# Patient Record
Sex: FEMALE | Race: WHITE | Employment: FULL TIME | URBAN - METROPOLITAN AREA
[De-identification: names, ages, dates, MRNs, and addresses within clinical notes are randomized per-mention and may not be internally consistent; named-entity substitution may affect disease eponyms.]

---

## 2018-06-13 ENCOUNTER — INITIAL CONSULT (OUTPATIENT)
Dept: SURGERY | Age: 28
End: 2018-06-13

## 2018-06-13 VITALS
SYSTOLIC BLOOD PRESSURE: 124 MMHG | HEIGHT: 64 IN | HEART RATE: 70 BPM | DIASTOLIC BLOOD PRESSURE: 77 MMHG | WEIGHT: 167 LBS | BODY MASS INDEX: 28.51 KG/M2

## 2018-06-13 DIAGNOSIS — M62.08 DIASTASIS RECTI: Primary | ICD-10-CM

## 2018-06-13 PROBLEM — K42.9 UMBILICAL HERNIA WITHOUT OBSTRUCTION AND WITHOUT GANGRENE: Status: ACTIVE | Noted: 2018-06-13

## 2018-06-13 PROCEDURE — 99213 OFFICE O/P EST LOW 20 MIN: CPT | Performed by: SURGERY

## 2018-06-13 ASSESSMENT — ENCOUNTER SYMPTOMS: GASTROINTESTINAL NEGATIVE: 1

## 2019-10-10 ENCOUNTER — HOSPITAL ENCOUNTER (OUTPATIENT)
Dept: NUCLEAR MEDICINE | Age: 29
Discharge: HOME | End: 2019-10-10
Payer: COMMERCIAL

## 2019-10-10 ENCOUNTER — HOSPITAL ENCOUNTER (OUTPATIENT)
Dept: MRI IMAGING | Age: 29
Discharge: HOME | End: 2019-10-10
Payer: COMMERCIAL

## 2019-10-10 ENCOUNTER — HOSPITAL ENCOUNTER (OUTPATIENT)
Dept: CT IMAGING | Age: 29
Discharge: HOME | End: 2019-10-10
Payer: COMMERCIAL

## 2019-10-10 ENCOUNTER — APPOINTMENT (OUTPATIENT)
Dept: WOMENS IMAGING | Age: 29
End: 2019-10-10
Payer: COMMERCIAL

## 2019-10-10 DIAGNOSIS — C50.511 MALIGNANT NEOPLASM OF LOWER-OUTER QUADRANT OF RIGHT FEMALE BREAST, UNSPECIFIED ESTROGEN RECEPTOR STATUS (HCC): ICD-10-CM

## 2019-10-10 DIAGNOSIS — C50.919 MALIGNANT NEOPLASM OF FEMALE BREAST, UNSPECIFIED ESTROGEN RECEPTOR STATUS, UNSPECIFIED LATERALITY, UNSPECIFIED SITE OF BREAST (HCC): ICD-10-CM

## 2019-10-10 PROCEDURE — A9577 INJ MULTIHANCE: HCPCS | Performed by: SURGERY

## 2019-10-10 PROCEDURE — A9503 TC99M MEDRONATE: HCPCS | Performed by: SURGERY

## 2019-10-10 PROCEDURE — 6360000004 HC RX CONTRAST MEDICATION: Performed by: SURGERY

## 2019-10-10 PROCEDURE — 3430000000 HC RX DIAGNOSTIC RADIOPHARMACEUTICAL: Performed by: SURGERY

## 2019-10-10 PROCEDURE — 78306 BONE IMAGING WHOLE BODY: CPT

## 2019-10-10 PROCEDURE — 77049 MRI BREAST C-+ W/CAD BI: CPT

## 2019-10-10 PROCEDURE — 74177 CT ABD & PELVIS W/CONTRAST: CPT

## 2019-10-10 RX ORDER — TC 99M MEDRONATE 20 MG/10ML
25 INJECTION, POWDER, LYOPHILIZED, FOR SOLUTION INTRAVENOUS
Status: COMPLETED | OUTPATIENT
Start: 2019-10-10 | End: 2019-10-10

## 2019-10-10 RX ADMIN — IOPAMIDOL 75 ML: 755 INJECTION, SOLUTION INTRAVENOUS at 09:40

## 2019-10-10 RX ADMIN — GADOBENATE DIMEGLUMINE 15 ML: 529 INJECTION, SOLUTION INTRAVENOUS at 12:15

## 2019-10-10 RX ADMIN — TC 99M MEDRONATE 25 MILLICURIE: 20 INJECTION, POWDER, LYOPHILIZED, FOR SOLUTION INTRAVENOUS at 09:43

## 2019-10-16 ENCOUNTER — HOSPITAL ENCOUNTER (OUTPATIENT)
Age: 29
Setting detail: OUTPATIENT SURGERY
Discharge: HOME | End: 2019-10-16
Attending: SURGERY | Admitting: SURGERY
Payer: COMMERCIAL

## 2019-10-16 ENCOUNTER — APPOINTMENT (OUTPATIENT)
Dept: GENERAL RADIOLOGY | Age: 29
End: 2019-10-16
Attending: SURGERY
Payer: COMMERCIAL

## 2019-10-16 ENCOUNTER — ANESTHESIA (OUTPATIENT)
Dept: OPERATING ROOM | Age: 29
End: 2019-10-16
Payer: COMMERCIAL

## 2019-10-16 ENCOUNTER — ANESTHESIA EVENT (OUTPATIENT)
Dept: OPERATING ROOM | Age: 29
End: 2019-10-16
Payer: COMMERCIAL

## 2019-10-16 VITALS
RESPIRATION RATE: 16 BRPM | DIASTOLIC BLOOD PRESSURE: 83 MMHG | TEMPERATURE: 97.6 F | WEIGHT: 165 LBS | HEIGHT: 64 IN | SYSTOLIC BLOOD PRESSURE: 145 MMHG | BODY MASS INDEX: 28.17 KG/M2 | HEART RATE: 52 BPM | OXYGEN SATURATION: 100 %

## 2019-10-16 VITALS — SYSTOLIC BLOOD PRESSURE: 88 MMHG | OXYGEN SATURATION: 96 % | DIASTOLIC BLOOD PRESSURE: 53 MMHG

## 2019-10-16 DIAGNOSIS — C50.511 MALIGNANT NEOPLASM OF LOWER-OUTER QUADRANT OF RIGHT BREAST OF FEMALE, ESTROGEN RECEPTOR POSITIVE (HCC): Primary | ICD-10-CM

## 2019-10-16 DIAGNOSIS — Z17.0 MALIGNANT NEOPLASM OF LOWER-OUTER QUADRANT OF RIGHT BREAST OF FEMALE, ESTROGEN RECEPTOR POSITIVE (HCC): Primary | ICD-10-CM

## 2019-10-16 LAB
A/G RATIO: 1.5 (ref 1.1–2.2)
ALBUMIN SERPL-MCNC: 4.8 G/DL (ref 3.4–5)
ALP BLD-CCNC: 84 U/L (ref 40–129)
ALT SERPL-CCNC: 21 U/L (ref 10–40)
ANION GAP SERPL CALCULATED.3IONS-SCNC: 13 MMOL/L (ref 3–16)
AST SERPL-CCNC: 22 U/L (ref 15–37)
BILIRUB SERPL-MCNC: 0.5 MG/DL (ref 0–1)
BUN BLDV-MCNC: 9 MG/DL (ref 7–20)
CALCIUM SERPL-MCNC: 9.6 MG/DL (ref 8.3–10.6)
CHLORIDE BLD-SCNC: 99 MMOL/L (ref 99–110)
CO2: 26 MMOL/L (ref 21–32)
CREAT SERPL-MCNC: 0.6 MG/DL (ref 0.6–1.1)
GFR AFRICAN AMERICAN: >60
GFR NON-AFRICAN AMERICAN: >60
GLOBULIN: 3.3 G/DL
GLUCOSE BLD-MCNC: 90 MG/DL (ref 70–99)
HCT VFR BLD CALC: 41.8 % (ref 36–48)
HEMOGLOBIN: 14.1 G/DL (ref 12–16)
MCH RBC QN AUTO: 29.8 PG (ref 26–34)
MCHC RBC AUTO-ENTMCNC: 33.7 G/DL (ref 31–36)
MCV RBC AUTO: 88.5 FL (ref 80–100)
PDW BLD-RTO: 13.8 % (ref 12.4–15.4)
PLATELET # BLD: 252 K/UL (ref 135–450)
PMV BLD AUTO: 8.5 FL (ref 5–10.5)
POTASSIUM SERPL-SCNC: 3.8 MMOL/L (ref 3.5–5.1)
PREGNANCY, URINE: NEGATIVE
RBC # BLD: 4.72 M/UL (ref 4–5.2)
SODIUM BLD-SCNC: 138 MMOL/L (ref 136–145)
TOTAL PROTEIN: 8.1 G/DL (ref 6.4–8.2)
WBC # BLD: 8.5 K/UL (ref 4–11)

## 2019-10-16 PROCEDURE — 3600000014 HC SURGERY LEVEL 4 ADDTL 15MIN: Performed by: SURGERY

## 2019-10-16 PROCEDURE — 3700000000 HC ANESTHESIA ATTENDED CARE: Performed by: SURGERY

## 2019-10-16 PROCEDURE — 6360000002 HC RX W HCPCS: Performed by: ANESTHESIOLOGY

## 2019-10-16 PROCEDURE — 80053 COMPREHEN METABOLIC PANEL: CPT

## 2019-10-16 PROCEDURE — 7100000011 HC PHASE II RECOVERY - ADDTL 15 MIN: Performed by: SURGERY

## 2019-10-16 PROCEDURE — C1788 PORT, INDWELLING, IMP: HCPCS | Performed by: SURGERY

## 2019-10-16 PROCEDURE — 7100000001 HC PACU RECOVERY - ADDTL 15 MIN: Performed by: SURGERY

## 2019-10-16 PROCEDURE — 3700000001 HC ADD 15 MINUTES (ANESTHESIA): Performed by: SURGERY

## 2019-10-16 PROCEDURE — 7100000000 HC PACU RECOVERY - FIRST 15 MIN: Performed by: SURGERY

## 2019-10-16 PROCEDURE — 3600000004 HC SURGERY LEVEL 4 BASE: Performed by: SURGERY

## 2019-10-16 PROCEDURE — 7100000010 HC PHASE II RECOVERY - FIRST 15 MIN: Performed by: SURGERY

## 2019-10-16 PROCEDURE — 84703 CHORIONIC GONADOTROPIN ASSAY: CPT

## 2019-10-16 PROCEDURE — 85027 COMPLETE CBC AUTOMATED: CPT

## 2019-10-16 PROCEDURE — 6360000002 HC RX W HCPCS: Performed by: NURSE ANESTHETIST, CERTIFIED REGISTERED

## 2019-10-16 PROCEDURE — 2500000003 HC RX 250 WO HCPCS: Performed by: SURGERY

## 2019-10-16 PROCEDURE — 2580000003 HC RX 258: Performed by: SURGERY

## 2019-10-16 PROCEDURE — 6360000002 HC RX W HCPCS: Performed by: SURGERY

## 2019-10-16 PROCEDURE — 2709999900 HC NON-CHARGEABLE SUPPLY: Performed by: SURGERY

## 2019-10-16 PROCEDURE — 2580000003 HC RX 258: Performed by: ANESTHESIOLOGY

## 2019-10-16 PROCEDURE — 77001 FLUOROGUIDE FOR VEIN DEVICE: CPT

## 2019-10-16 PROCEDURE — 71045 X-RAY EXAM CHEST 1 VIEW: CPT

## 2019-10-16 DEVICE — PORT INFUS 6FR PLAS PWR INJ ATTCH POLYUR CATH SIL FILL SUT: Type: IMPLANTABLE DEVICE | Site: SUBCLAVIAN | Status: FUNCTIONAL

## 2019-10-16 RX ORDER — LABETALOL 20 MG/4 ML (5 MG/ML) INTRAVENOUS SYRINGE
5 EVERY 10 MIN PRN
Status: DISCONTINUED | OUTPATIENT
Start: 2019-10-16 | End: 2019-10-16 | Stop reason: HOSPADM

## 2019-10-16 RX ORDER — HYDROCODONE BITARTRATE AND ACETAMINOPHEN 5; 325 MG/1; MG/1
1 TABLET ORAL EVERY 6 HOURS PRN
Qty: 10 TABLET | Refills: 0 | Status: SHIPPED | OUTPATIENT
Start: 2019-10-16 | End: 2019-10-19

## 2019-10-16 RX ORDER — SODIUM CHLORIDE, SODIUM LACTATE, POTASSIUM CHLORIDE, CALCIUM CHLORIDE 600; 310; 30; 20 MG/100ML; MG/100ML; MG/100ML; MG/100ML
INJECTION, SOLUTION INTRAVENOUS CONTINUOUS
Status: DISCONTINUED | OUTPATIENT
Start: 2019-10-16 | End: 2019-10-16 | Stop reason: HOSPADM

## 2019-10-16 RX ORDER — LIDOCAINE HYDROCHLORIDE 10 MG/ML
INJECTION, SOLUTION INFILTRATION; PERINEURAL PRN
Status: DISCONTINUED | OUTPATIENT
Start: 2019-10-16 | End: 2019-10-16 | Stop reason: ALTCHOICE

## 2019-10-16 RX ORDER — FENTANYL CITRATE 50 UG/ML
INJECTION, SOLUTION INTRAMUSCULAR; INTRAVENOUS PRN
Status: DISCONTINUED | OUTPATIENT
Start: 2019-10-16 | End: 2019-10-16 | Stop reason: SDUPTHER

## 2019-10-16 RX ORDER — PROMETHAZINE HYDROCHLORIDE 25 MG/ML
6.25 INJECTION, SOLUTION INTRAMUSCULAR; INTRAVENOUS
Status: DISCONTINUED | OUTPATIENT
Start: 2019-10-16 | End: 2019-10-16 | Stop reason: HOSPADM

## 2019-10-16 RX ORDER — FENTANYL CITRATE 50 UG/ML
50 INJECTION, SOLUTION INTRAMUSCULAR; INTRAVENOUS EVERY 5 MIN PRN
Status: DISCONTINUED | OUTPATIENT
Start: 2019-10-16 | End: 2019-10-16 | Stop reason: HOSPADM

## 2019-10-16 RX ORDER — HYDRALAZINE HYDROCHLORIDE 20 MG/ML
5 INJECTION INTRAMUSCULAR; INTRAVENOUS EVERY 10 MIN PRN
Status: DISCONTINUED | OUTPATIENT
Start: 2019-10-16 | End: 2019-10-16 | Stop reason: HOSPADM

## 2019-10-16 RX ORDER — LIDOCAINE HYDROCHLORIDE 20 MG/ML
INJECTION, SOLUTION INTRAVENOUS PRN
Status: DISCONTINUED | OUTPATIENT
Start: 2019-10-16 | End: 2019-10-16 | Stop reason: SDUPTHER

## 2019-10-16 RX ORDER — ONDANSETRON 2 MG/ML
4 INJECTION INTRAMUSCULAR; INTRAVENOUS
Status: DISCONTINUED | OUTPATIENT
Start: 2019-10-16 | End: 2019-10-16 | Stop reason: HOSPADM

## 2019-10-16 RX ORDER — HEPARIN SODIUM (PORCINE) LOCK FLUSH IV SOLN 100 UNIT/ML 100 UNIT/ML
SOLUTION INTRAVENOUS PRN
Status: DISCONTINUED | OUTPATIENT
Start: 2019-10-16 | End: 2019-10-16 | Stop reason: ALTCHOICE

## 2019-10-16 RX ORDER — LIDOCAINE AND PRILOCAINE 25; 25 MG/G; MG/G
CREAM TOPICAL
Qty: 1 TUBE | Refills: 10 | Status: SHIPPED | OUTPATIENT
Start: 2019-10-16 | End: 2021-11-01

## 2019-10-16 RX ORDER — PROPOFOL 10 MG/ML
INJECTION, EMULSION INTRAVENOUS PRN
Status: DISCONTINUED | OUTPATIENT
Start: 2019-10-16 | End: 2019-10-16 | Stop reason: SDUPTHER

## 2019-10-16 RX ORDER — MAGNESIUM HYDROXIDE 1200 MG/15ML
LIQUID ORAL CONTINUOUS PRN
Status: COMPLETED | OUTPATIENT
Start: 2019-10-16 | End: 2019-10-16

## 2019-10-16 RX ORDER — CEFAZOLIN SODIUM 1 G/3ML
INJECTION, POWDER, FOR SOLUTION INTRAMUSCULAR; INTRAVENOUS PRN
Status: DISCONTINUED | OUTPATIENT
Start: 2019-10-16 | End: 2019-10-16 | Stop reason: SDUPTHER

## 2019-10-16 RX ORDER — FENTANYL CITRATE 50 UG/ML
25 INJECTION, SOLUTION INTRAMUSCULAR; INTRAVENOUS EVERY 5 MIN PRN
Status: DISCONTINUED | OUTPATIENT
Start: 2019-10-16 | End: 2019-10-16 | Stop reason: HOSPADM

## 2019-10-16 RX ORDER — OXYCODONE HYDROCHLORIDE AND ACETAMINOPHEN 5; 325 MG/1; MG/1
1 TABLET ORAL
Status: DISCONTINUED | OUTPATIENT
Start: 2019-10-16 | End: 2019-10-16 | Stop reason: HOSPADM

## 2019-10-16 RX ORDER — MIDAZOLAM HYDROCHLORIDE 1 MG/ML
INJECTION INTRAMUSCULAR; INTRAVENOUS PRN
Status: DISCONTINUED | OUTPATIENT
Start: 2019-10-16 | End: 2019-10-16 | Stop reason: SDUPTHER

## 2019-10-16 RX ORDER — ONDANSETRON 4 MG/1
4 TABLET, FILM COATED ORAL EVERY 8 HOURS PRN
Qty: 2 TABLET | Refills: 1 | Status: ON HOLD | OUTPATIENT
Start: 2019-10-16 | End: 2019-11-24

## 2019-10-16 RX ADMIN — PROPOFOL 50 MG: 10 INJECTION, EMULSION INTRAVENOUS at 13:45

## 2019-10-16 RX ADMIN — FENTANYL CITRATE 50 MCG: 50 INJECTION INTRAMUSCULAR; INTRAVENOUS at 13:44

## 2019-10-16 RX ADMIN — PROPOFOL 50 MG: 10 INJECTION, EMULSION INTRAVENOUS at 13:39

## 2019-10-16 RX ADMIN — LIDOCAINE HYDROCHLORIDE 50 MG: 20 INJECTION, SOLUTION INTRAVENOUS at 13:34

## 2019-10-16 RX ADMIN — MIDAZOLAM HYDROCHLORIDE 2 MG: 2 INJECTION, SOLUTION INTRAMUSCULAR; INTRAVENOUS at 13:26

## 2019-10-16 RX ADMIN — SODIUM CHLORIDE, SODIUM LACTATE, POTASSIUM CHLORIDE, AND CALCIUM CHLORIDE: 600; 310; 30; 20 INJECTION, SOLUTION INTRAVENOUS at 12:40

## 2019-10-16 RX ADMIN — PROPOFOL 30 MG: 10 INJECTION, EMULSION INTRAVENOUS at 13:52

## 2019-10-16 RX ADMIN — CEFAZOLIN SODIUM 2000 MG: 1 POWDER, FOR SOLUTION INTRAMUSCULAR; INTRAVENOUS at 13:32

## 2019-10-16 RX ADMIN — SODIUM CHLORIDE, SODIUM LACTATE, POTASSIUM CHLORIDE, AND CALCIUM CHLORIDE: 600; 310; 30; 20 INJECTION, SOLUTION INTRAVENOUS at 13:29

## 2019-10-16 RX ADMIN — PROPOFOL 50 MG: 10 INJECTION, EMULSION INTRAVENOUS at 13:34

## 2019-10-16 RX ADMIN — PROPOFOL 50 MG: 10 INJECTION, EMULSION INTRAVENOUS at 13:41

## 2019-10-16 RX ADMIN — PROPOFOL 50 MG: 10 INJECTION, EMULSION INTRAVENOUS at 13:48

## 2019-10-16 RX ADMIN — FENTANYL CITRATE 50 MCG: 50 INJECTION INTRAMUSCULAR; INTRAVENOUS at 13:34

## 2019-10-16 RX ADMIN — PROPOFOL 50 MG: 10 INJECTION, EMULSION INTRAVENOUS at 13:36

## 2019-10-16 RX ADMIN — FENTANYL CITRATE 50 MCG: 50 INJECTION INTRAMUSCULAR; INTRAVENOUS at 14:33

## 2019-10-16 RX ADMIN — PROPOFOL 50 MG: 10 INJECTION, EMULSION INTRAVENOUS at 13:43

## 2019-10-16 RX ADMIN — PROPOFOL 30 MG: 10 INJECTION, EMULSION INTRAVENOUS at 13:55

## 2019-10-16 ASSESSMENT — PULMONARY FUNCTION TESTS
PIF_VALUE: 0
PIF_VALUE: 0
PIF_VALUE: 1
PIF_VALUE: 0
PIF_VALUE: 1
PIF_VALUE: 0
PIF_VALUE: 0
PIF_VALUE: 1
PIF_VALUE: 0
PIF_VALUE: 1
PIF_VALUE: 0
PIF_VALUE: 1
PIF_VALUE: 1
PIF_VALUE: 0
PIF_VALUE: 1
PIF_VALUE: 0
PIF_VALUE: 1
PIF_VALUE: 0
PIF_VALUE: 1
PIF_VALUE: 0
PIF_VALUE: 1
PIF_VALUE: 0
PIF_VALUE: 0

## 2019-10-16 ASSESSMENT — PAIN DESCRIPTION - DESCRIPTORS
DESCRIPTORS: ACHING;DISCOMFORT
DESCRIPTORS: THROBBING
DESCRIPTORS: SORE

## 2019-10-16 ASSESSMENT — PAIN DESCRIPTION - ORIENTATION
ORIENTATION: LEFT;UPPER
ORIENTATION: LEFT;UPPER

## 2019-10-16 ASSESSMENT — PAIN DESCRIPTION - LOCATION
LOCATION: CHEST
LOCATION: CHEST

## 2019-10-16 ASSESSMENT — PAIN SCALES - GENERAL
PAINLEVEL_OUTOF10: 4
PAINLEVEL_OUTOF10: 4
PAINLEVEL_OUTOF10: 8

## 2019-10-16 ASSESSMENT — PAIN - FUNCTIONAL ASSESSMENT: PAIN_FUNCTIONAL_ASSESSMENT: 0-10

## 2019-10-16 ASSESSMENT — PAIN DESCRIPTION - PAIN TYPE
TYPE: SURGICAL PAIN
TYPE: SURGICAL PAIN

## 2019-10-17 ENCOUNTER — HOSPITAL ENCOUNTER (OUTPATIENT)
Dept: NON INVASIVE DIAGNOSTICS | Age: 29
Discharge: HOME | End: 2019-10-17
Payer: COMMERCIAL

## 2019-10-17 LAB
LV EF: 58 %
LVEF MODALITY: NORMAL

## 2019-10-17 PROCEDURE — 93306 TTE W/DOPPLER COMPLETE: CPT

## 2019-11-24 ENCOUNTER — APPOINTMENT (OUTPATIENT)
Dept: CT IMAGING | Age: 29
DRG: 153 | End: 2019-11-24
Payer: COMMERCIAL

## 2019-11-24 ENCOUNTER — APPOINTMENT (OUTPATIENT)
Dept: GENERAL RADIOLOGY | Age: 29
DRG: 153 | End: 2019-11-24
Payer: COMMERCIAL

## 2019-11-24 ENCOUNTER — HOSPITAL ENCOUNTER (INPATIENT)
Age: 29
LOS: 2 days | Discharge: HOME | DRG: 153 | End: 2019-11-26
Attending: EMERGENCY MEDICINE | Admitting: INTERNAL MEDICINE
Payer: COMMERCIAL

## 2019-11-24 DIAGNOSIS — R53.1 GENERALIZED WEAKNESS: Primary | ICD-10-CM

## 2019-11-24 PROBLEM — R41.82 ALTERED MENTAL STATUS: Status: ACTIVE | Noted: 2019-11-24

## 2019-11-24 LAB
ALBUMIN SERPL-MCNC: 3.9 G/DL (ref 3.4–5)
ALP BLD-CCNC: 139 U/L (ref 40–129)
ALT SERPL-CCNC: 11 U/L (ref 10–40)
AMMONIA: 24 UMOL/L (ref 11–51)
ANION GAP SERPL CALCULATED.3IONS-SCNC: 7 MMOL/L (ref 3–16)
AST SERPL-CCNC: 9 U/L (ref 15–37)
BANDED NEUTROPHILS RELATIVE PERCENT: 1 % (ref 0–7)
BASOPHILS ABSOLUTE: 0 K/UL (ref 0–0.2)
BASOPHILS RELATIVE PERCENT: 0 %
BILIRUB SERPL-MCNC: <0.2 MG/DL (ref 0–1)
BILIRUBIN DIRECT: <0.2 MG/DL (ref 0–0.3)
BILIRUBIN URINE: NEGATIVE
BILIRUBIN, INDIRECT: ABNORMAL MG/DL (ref 0–1)
BLOOD, URINE: NEGATIVE
BUN BLDV-MCNC: 18 MG/DL (ref 7–20)
CALCIUM SERPL-MCNC: 8.5 MG/DL (ref 8.3–10.6)
CHLORIDE BLD-SCNC: 106 MMOL/L (ref 99–110)
CLARITY: CLEAR
CO2: 25 MMOL/L (ref 21–32)
COLOR: YELLOW
CREAT SERPL-MCNC: <0.5 MG/DL (ref 0.6–1.1)
EOSINOPHILS ABSOLUTE: 0 K/UL (ref 0–0.6)
EOSINOPHILS RELATIVE PERCENT: 0 %
GFR AFRICAN AMERICAN: >60
GFR NON-AFRICAN AMERICAN: >60
GLUCOSE BLD-MCNC: 121 MG/DL (ref 70–99)
GLUCOSE URINE: NEGATIVE MG/DL
HCG(URINE) PREGNANCY TEST: NEGATIVE
HCT VFR BLD CALC: 34.8 % (ref 36–48)
HEMOGLOBIN: 11.3 G/DL (ref 12–16)
KETONES, URINE: NEGATIVE MG/DL
LEUKOCYTE ESTERASE, URINE: NEGATIVE
LIPASE: 10 U/L (ref 13–60)
LYMPHOCYTES ABSOLUTE: 0 K/UL (ref 1–5.1)
LYMPHOCYTES RELATIVE PERCENT: 0 %
MCH RBC QN AUTO: 29.4 PG (ref 26–34)
MCHC RBC AUTO-ENTMCNC: 32.5 G/DL (ref 31–36)
MCV RBC AUTO: 90.4 FL (ref 80–100)
MICROSCOPIC EXAMINATION: NORMAL
MONOCYTES ABSOLUTE: 0 K/UL (ref 0–1.3)
MONOCYTES RELATIVE PERCENT: 0 %
NEUTROPHILS ABSOLUTE: 35.7 K/UL (ref 1.7–7.7)
NEUTROPHILS RELATIVE PERCENT: 99 %
NITRITE, URINE: NEGATIVE
PDW BLD-RTO: 15.4 % (ref 12.4–15.4)
PH UA: 7 (ref 5–8)
PLATELET # BLD: 190 K/UL (ref 135–450)
PMV BLD AUTO: 7.5 FL (ref 5–10.5)
POTASSIUM REFLEX MAGNESIUM: 4.2 MMOL/L (ref 3.5–5.1)
PROTEIN UA: NEGATIVE MG/DL
RAPID INFLUENZA  B AGN: NEGATIVE
RAPID INFLUENZA A AGN: NEGATIVE
RBC # BLD: 3.85 M/UL (ref 4–5.2)
SODIUM BLD-SCNC: 138 MMOL/L (ref 136–145)
SPECIFIC GRAVITY UA: 1.01 (ref 1–1.03)
TOTAL PROTEIN: 6.2 G/DL (ref 6.4–8.2)
URINE TYPE: NORMAL
UROBILINOGEN, URINE: 0.2 E.U./DL
WBC # BLD: 35.7 K/UL (ref 4–11)

## 2019-11-24 PROCEDURE — 87040 BLOOD CULTURE FOR BACTERIA: CPT

## 2019-11-24 PROCEDURE — 70450 CT HEAD/BRAIN W/O DYE: CPT

## 2019-11-24 PROCEDURE — 6370000000 HC RX 637 (ALT 250 FOR IP): Performed by: INTERNAL MEDICINE

## 2019-11-24 PROCEDURE — 99285 EMERGENCY DEPT VISIT HI MDM: CPT

## 2019-11-24 PROCEDURE — 87804 INFLUENZA ASSAY W/OPTIC: CPT

## 2019-11-24 PROCEDURE — G0378 HOSPITAL OBSERVATION PER HR: HCPCS

## 2019-11-24 PROCEDURE — 2580000003 HC RX 258: Performed by: INTERNAL MEDICINE

## 2019-11-24 PROCEDURE — 2500000003 HC RX 250 WO HCPCS: Performed by: INTERNAL MEDICINE

## 2019-11-24 PROCEDURE — 6360000002 HC RX W HCPCS: Performed by: INTERNAL MEDICINE

## 2019-11-24 PROCEDURE — 2580000003 HC RX 258: Performed by: STUDENT IN AN ORGANIZED HEALTH CARE EDUCATION/TRAINING PROGRAM

## 2019-11-24 PROCEDURE — 84443 ASSAY THYROID STIM HORMONE: CPT

## 2019-11-24 PROCEDURE — 6360000004 HC RX CONTRAST MEDICATION: Performed by: EMERGENCY MEDICINE

## 2019-11-24 PROCEDURE — 74177 CT ABD & PELVIS W/CONTRAST: CPT

## 2019-11-24 PROCEDURE — 81003 URINALYSIS AUTO W/O SCOPE: CPT

## 2019-11-24 PROCEDURE — 85025 COMPLETE CBC W/AUTO DIFF WBC: CPT

## 2019-11-24 PROCEDURE — 80076 HEPATIC FUNCTION PANEL: CPT

## 2019-11-24 PROCEDURE — 84703 CHORIONIC GONADOTROPIN ASSAY: CPT

## 2019-11-24 PROCEDURE — 36415 COLL VENOUS BLD VENIPUNCTURE: CPT

## 2019-11-24 PROCEDURE — 71045 X-RAY EXAM CHEST 1 VIEW: CPT

## 2019-11-24 PROCEDURE — 96365 THER/PROPH/DIAG IV INF INIT: CPT

## 2019-11-24 PROCEDURE — 96367 TX/PROPH/DG ADDL SEQ IV INF: CPT

## 2019-11-24 PROCEDURE — 96372 THER/PROPH/DIAG INJ SC/IM: CPT

## 2019-11-24 PROCEDURE — 83690 ASSAY OF LIPASE: CPT

## 2019-11-24 PROCEDURE — 80048 BASIC METABOLIC PNL TOTAL CA: CPT

## 2019-11-24 PROCEDURE — 6360000002 HC RX W HCPCS: Performed by: STUDENT IN AN ORGANIZED HEALTH CARE EDUCATION/TRAINING PROGRAM

## 2019-11-24 PROCEDURE — 1200000000 HC SEMI PRIVATE

## 2019-11-24 PROCEDURE — 82140 ASSAY OF AMMONIA: CPT

## 2019-11-24 PROCEDURE — 96361 HYDRATE IV INFUSION ADD-ON: CPT

## 2019-11-24 RX ORDER — CETIRIZINE HYDROCHLORIDE 10 MG/1
10 TABLET ORAL DAILY
Status: DISCONTINUED | OUTPATIENT
Start: 2019-11-24 | End: 2019-11-26 | Stop reason: HOSPADM

## 2019-11-24 RX ORDER — SODIUM CHLORIDE 9 MG/ML
INJECTION, SOLUTION INTRAVENOUS CONTINUOUS
Status: DISCONTINUED | OUTPATIENT
Start: 2019-11-24 | End: 2019-11-26 | Stop reason: HOSPADM

## 2019-11-24 RX ORDER — SODIUM CHLORIDE 0.9 % (FLUSH) 0.9 %
10 SYRINGE (ML) INJECTION PRN
Status: DISCONTINUED | OUTPATIENT
Start: 2019-11-24 | End: 2019-11-26 | Stop reason: HOSPADM

## 2019-11-24 RX ORDER — SODIUM CHLORIDE, SODIUM LACTATE, POTASSIUM CHLORIDE, CALCIUM CHLORIDE 600; 310; 30; 20 MG/100ML; MG/100ML; MG/100ML; MG/100ML
1000 INJECTION, SOLUTION INTRAVENOUS ONCE
Status: COMPLETED | OUTPATIENT
Start: 2019-11-24 | End: 2019-11-24

## 2019-11-24 RX ORDER — LEVOTHYROXINE SODIUM 0.05 MG/1
50 TABLET ORAL DAILY
Status: DISCONTINUED | OUTPATIENT
Start: 2019-11-24 | End: 2019-11-26 | Stop reason: HOSPADM

## 2019-11-24 RX ORDER — OLANZAPINE 10 MG/1
10 TABLET ORAL EVERY 6 HOURS PRN
Status: DISCONTINUED | OUTPATIENT
Start: 2019-11-24 | End: 2019-11-24

## 2019-11-24 RX ORDER — SODIUM CHLORIDE 0.9 % (FLUSH) 0.9 %
10 SYRINGE (ML) INJECTION EVERY 12 HOURS SCHEDULED
Status: DISCONTINUED | OUTPATIENT
Start: 2019-11-24 | End: 2019-11-26 | Stop reason: HOSPADM

## 2019-11-24 RX ORDER — OLANZAPINE 10 MG/1
5 TABLET ORAL EVERY 6 HOURS PRN
Status: DISCONTINUED | OUTPATIENT
Start: 2019-11-24 | End: 2019-11-25

## 2019-11-24 RX ORDER — ACETAMINOPHEN 500 MG
1000 TABLET ORAL EVERY 8 HOURS PRN
Status: DISCONTINUED | OUTPATIENT
Start: 2019-11-24 | End: 2019-11-26 | Stop reason: HOSPADM

## 2019-11-24 RX ORDER — ONDANSETRON 2 MG/ML
4 INJECTION INTRAMUSCULAR; INTRAVENOUS EVERY 6 HOURS PRN
Status: DISCONTINUED | OUTPATIENT
Start: 2019-11-24 | End: 2019-11-26 | Stop reason: HOSPADM

## 2019-11-24 RX ORDER — CETIRIZINE HYDROCHLORIDE 10 MG/1
10 TABLET ORAL DAILY
Status: DISCONTINUED | OUTPATIENT
Start: 2019-11-25 | End: 2019-11-24

## 2019-11-24 RX ADMIN — IOPAMIDOL 80 ML: 755 INJECTION, SOLUTION INTRAVENOUS at 16:01

## 2019-11-24 RX ADMIN — SODIUM CHLORIDE: 9 INJECTION, SOLUTION INTRAVENOUS at 19:38

## 2019-11-24 RX ADMIN — FOLIC ACID: 5 INJECTION, SOLUTION INTRAMUSCULAR; INTRAVENOUS; SUBCUTANEOUS at 20:51

## 2019-11-24 RX ADMIN — CEFEPIME HYDROCHLORIDE 2 G: 2 INJECTION, POWDER, FOR SOLUTION INTRAVENOUS at 15:03

## 2019-11-24 RX ADMIN — CETIRIZINE HYDROCHLORIDE 10 MG: 10 TABLET, FILM COATED ORAL at 22:12

## 2019-11-24 RX ADMIN — VANCOMYCIN HYDROCHLORIDE 1000 MG: 10 INJECTION, POWDER, LYOPHILIZED, FOR SOLUTION INTRAVENOUS at 20:51

## 2019-11-24 RX ADMIN — OLANZAPINE 5 MG: 10 TABLET, FILM COATED ORAL at 22:11

## 2019-11-24 RX ADMIN — ENOXAPARIN SODIUM 40 MG: 40 INJECTION SUBCUTANEOUS at 20:51

## 2019-11-24 RX ADMIN — SODIUM CHLORIDE, POTASSIUM CHLORIDE, SODIUM LACTATE AND CALCIUM CHLORIDE 1000 ML: 600; 310; 30; 20 INJECTION, SOLUTION INTRAVENOUS at 12:46

## 2019-11-24 ASSESSMENT — PAIN SCALES - GENERAL
PAINLEVEL_OUTOF10: 0
PAINLEVEL_OUTOF10: 0

## 2019-11-25 LAB
ALBUMIN SERPL-MCNC: 3.8 G/DL (ref 3.4–5)
ANION GAP SERPL CALCULATED.3IONS-SCNC: 11 MMOL/L (ref 3–16)
BASOPHILS ABSOLUTE: 0 K/UL (ref 0–0.2)
BASOPHILS RELATIVE PERCENT: 0 %
BUN BLDV-MCNC: 19 MG/DL (ref 7–20)
CALCIUM SERPL-MCNC: 8.7 MG/DL (ref 8.3–10.6)
CHLORIDE BLD-SCNC: 103 MMOL/L (ref 99–110)
CO2: 25 MMOL/L (ref 21–32)
CREAT SERPL-MCNC: <0.5 MG/DL (ref 0.6–1.1)
EOSINOPHILS ABSOLUTE: 0 K/UL (ref 0–0.6)
EOSINOPHILS RELATIVE PERCENT: 0 %
GFR AFRICAN AMERICAN: >60
GFR NON-AFRICAN AMERICAN: >60
GLUCOSE BLD-MCNC: 96 MG/DL (ref 70–99)
HCT VFR BLD CALC: 32 % (ref 36–48)
HEMOGLOBIN: 10.8 G/DL (ref 12–16)
LYMPHOCYTES ABSOLUTE: 1.7 K/UL (ref 1–5.1)
LYMPHOCYTES RELATIVE PERCENT: 7 %
MCH RBC QN AUTO: 30.4 PG (ref 26–34)
MCHC RBC AUTO-ENTMCNC: 33.6 G/DL (ref 31–36)
MCV RBC AUTO: 90.5 FL (ref 80–100)
MONOCYTES ABSOLUTE: 0 K/UL (ref 0–1.3)
MONOCYTES RELATIVE PERCENT: 0 %
NEUTROPHILS ABSOLUTE: 22.4 K/UL (ref 1.7–7.7)
NEUTROPHILS RELATIVE PERCENT: 93 %
PDW BLD-RTO: 15.4 % (ref 12.4–15.4)
PHOSPHORUS: 3.3 MG/DL (ref 2.5–4.9)
PLATELET # BLD: 181 K/UL (ref 135–450)
PMV BLD AUTO: 7.3 FL (ref 5–10.5)
POTASSIUM SERPL-SCNC: 4 MMOL/L (ref 3.5–5.1)
RBC # BLD: 3.54 M/UL (ref 4–5.2)
SODIUM BLD-SCNC: 139 MMOL/L (ref 136–145)
TSH REFLEX: 1.09 UIU/ML (ref 0.27–4.2)
WBC # BLD: 24.1 K/UL (ref 4–11)

## 2019-11-25 PROCEDURE — 85025 COMPLETE CBC W/AUTO DIFF WBC: CPT

## 2019-11-25 PROCEDURE — 6370000000 HC RX 637 (ALT 250 FOR IP): Performed by: INTERNAL MEDICINE

## 2019-11-25 PROCEDURE — 97165 OT EVAL LOW COMPLEX 30 MIN: CPT

## 2019-11-25 PROCEDURE — 96366 THER/PROPH/DIAG IV INF ADDON: CPT

## 2019-11-25 PROCEDURE — 96372 THER/PROPH/DIAG INJ SC/IM: CPT

## 2019-11-25 PROCEDURE — 1200000000 HC SEMI PRIVATE

## 2019-11-25 PROCEDURE — G0378 HOSPITAL OBSERVATION PER HR: HCPCS

## 2019-11-25 PROCEDURE — 97530 THERAPEUTIC ACTIVITIES: CPT

## 2019-11-25 PROCEDURE — 87641 MR-STAPH DNA AMP PROBE: CPT

## 2019-11-25 PROCEDURE — 6360000002 HC RX W HCPCS: Performed by: INTERNAL MEDICINE

## 2019-11-25 PROCEDURE — 80069 RENAL FUNCTION PANEL: CPT

## 2019-11-25 PROCEDURE — 36591 DRAW BLOOD OFF VENOUS DEVICE: CPT

## 2019-11-25 PROCEDURE — 2580000003 HC RX 258: Performed by: INTERNAL MEDICINE

## 2019-11-25 RX ADMIN — CEFEPIME 2 G: 2 INJECTION, POWDER, FOR SOLUTION INTRAMUSCULAR; INTRAVENOUS at 15:11

## 2019-11-25 RX ADMIN — Medication 10 ML: at 08:12

## 2019-11-25 RX ADMIN — CEFEPIME 2 G: 2 INJECTION, POWDER, FOR SOLUTION INTRAMUSCULAR; INTRAVENOUS at 03:40

## 2019-11-25 RX ADMIN — CETIRIZINE HYDROCHLORIDE 10 MG: 10 TABLET, FILM COATED ORAL at 20:23

## 2019-11-25 RX ADMIN — ENOXAPARIN SODIUM 40 MG: 40 INJECTION SUBCUTANEOUS at 08:09

## 2019-11-25 RX ADMIN — LEVOTHYROXINE SODIUM 50 MCG: 50 TABLET ORAL at 05:48

## 2019-11-25 RX ADMIN — VANCOMYCIN HYDROCHLORIDE 1000 MG: 10 INJECTION, POWDER, LYOPHILIZED, FOR SOLUTION INTRAVENOUS at 03:40

## 2019-11-25 RX ADMIN — VANCOMYCIN HYDROCHLORIDE 1000 MG: 10 INJECTION, POWDER, LYOPHILIZED, FOR SOLUTION INTRAVENOUS at 12:47

## 2019-11-25 RX ADMIN — VANCOMYCIN HYDROCHLORIDE 1000 MG: 10 INJECTION, POWDER, LYOPHILIZED, FOR SOLUTION INTRAVENOUS at 20:24

## 2019-11-25 RX ADMIN — Medication 10 ML: at 20:25

## 2019-11-25 ASSESSMENT — PAIN SCALES - GENERAL
PAINLEVEL_OUTOF10: 0

## 2019-11-26 VITALS
DIASTOLIC BLOOD PRESSURE: 57 MMHG | SYSTOLIC BLOOD PRESSURE: 105 MMHG | HEIGHT: 64 IN | WEIGHT: 165 LBS | TEMPERATURE: 97.8 F | HEART RATE: 73 BPM | BODY MASS INDEX: 28.17 KG/M2 | OXYGEN SATURATION: 96 % | RESPIRATION RATE: 16 BRPM

## 2019-11-26 PROBLEM — D72.829 LEUKOCYTOSIS: Status: ACTIVE | Noted: 2019-11-26

## 2019-11-26 PROBLEM — R53.83 LETHARGY: Status: ACTIVE | Noted: 2019-11-24

## 2019-11-26 PROBLEM — J01.20 ACUTE ETHMOIDAL SINUSITIS: Status: ACTIVE | Noted: 2019-11-26

## 2019-11-26 LAB
ALBUMIN SERPL-MCNC: 3.5 G/DL (ref 3.4–5)
ANION GAP SERPL CALCULATED.3IONS-SCNC: 7 MMOL/L (ref 3–16)
BASOPHILS ABSOLUTE: 0 K/UL (ref 0–0.2)
BASOPHILS RELATIVE PERCENT: 0 %
BUN BLDV-MCNC: 20 MG/DL (ref 7–20)
CALCIUM SERPL-MCNC: 8.3 MG/DL (ref 8.3–10.6)
CHLORIDE BLD-SCNC: 105 MMOL/L (ref 99–110)
CO2: 26 MMOL/L (ref 21–32)
CREAT SERPL-MCNC: <0.5 MG/DL (ref 0.6–1.1)
EOSINOPHILS ABSOLUTE: 0 K/UL (ref 0–0.6)
EOSINOPHILS RELATIVE PERCENT: 0 %
GFR AFRICAN AMERICAN: >60
GFR NON-AFRICAN AMERICAN: >60
GLUCOSE BLD-MCNC: 99 MG/DL (ref 70–99)
HCT VFR BLD CALC: 31.5 % (ref 36–48)
HEMOGLOBIN: 10.7 G/DL (ref 12–16)
LYMPHOCYTES ABSOLUTE: 1.2 K/UL (ref 1–5.1)
LYMPHOCYTES RELATIVE PERCENT: 14 %
MCH RBC QN AUTO: 30.7 PG (ref 26–34)
MCHC RBC AUTO-ENTMCNC: 34 G/DL (ref 31–36)
MCV RBC AUTO: 90.4 FL (ref 80–100)
MONOCYTES ABSOLUTE: 0.1 K/UL (ref 0–1.3)
MONOCYTES RELATIVE PERCENT: 1 %
MRSA SCREEN RT-PCR: NORMAL
NEUTROPHILS ABSOLUTE: 7.6 K/UL (ref 1.7–7.7)
NEUTROPHILS RELATIVE PERCENT: 85 %
PDW BLD-RTO: 15.3 % (ref 12.4–15.4)
PHOSPHORUS: 4.1 MG/DL (ref 2.5–4.9)
PLATELET # BLD: 175 K/UL (ref 135–450)
PMV BLD AUTO: 7.4 FL (ref 5–10.5)
POTASSIUM SERPL-SCNC: 4 MMOL/L (ref 3.5–5.1)
RBC # BLD: 3.48 M/UL (ref 4–5.2)
SODIUM BLD-SCNC: 138 MMOL/L (ref 136–145)
VANCOMYCIN TROUGH: 9.8 UG/ML (ref 10–20)
WBC # BLD: 8.9 K/UL (ref 4–11)

## 2019-11-26 PROCEDURE — 2580000003 HC RX 258: Performed by: INTERNAL MEDICINE

## 2019-11-26 PROCEDURE — 6360000002 HC RX W HCPCS: Performed by: INTERNAL MEDICINE

## 2019-11-26 PROCEDURE — 96366 THER/PROPH/DIAG IV INF ADDON: CPT

## 2019-11-26 PROCEDURE — 80202 ASSAY OF VANCOMYCIN: CPT

## 2019-11-26 PROCEDURE — 6370000000 HC RX 637 (ALT 250 FOR IP): Performed by: INTERNAL MEDICINE

## 2019-11-26 PROCEDURE — 96375 TX/PRO/DX INJ NEW DRUG ADDON: CPT

## 2019-11-26 PROCEDURE — 85025 COMPLETE CBC W/AUTO DIFF WBC: CPT

## 2019-11-26 PROCEDURE — G0378 HOSPITAL OBSERVATION PER HR: HCPCS

## 2019-11-26 PROCEDURE — 80069 RENAL FUNCTION PANEL: CPT

## 2019-11-26 RX ORDER — AMOXICILLIN AND CLAVULANATE POTASSIUM 875; 125 MG/1; MG/1
1 TABLET, FILM COATED ORAL 2 TIMES DAILY
Qty: 14 TABLET | Refills: 0 | Status: SHIPPED | OUTPATIENT
Start: 2019-11-26 | End: 2019-12-03

## 2019-11-26 RX ORDER — CETIRIZINE HYDROCHLORIDE 10 MG/1
10 TABLET ORAL DAILY
Qty: 14 TABLET | Refills: 0 | Status: SHIPPED | OUTPATIENT
Start: 2019-11-26 | End: 2019-12-10

## 2019-11-26 RX ORDER — ONDANSETRON 4 MG/1
4 TABLET, FILM COATED ORAL EVERY 8 HOURS PRN
Qty: 21 TABLET | Refills: 1 | Status: SHIPPED | OUTPATIENT
Start: 2019-11-26 | End: 2021-11-01

## 2019-11-26 RX ADMIN — CEFEPIME 2 G: 2 INJECTION, POWDER, FOR SOLUTION INTRAMUSCULAR; INTRAVENOUS at 03:43

## 2019-11-26 RX ADMIN — Medication 10 ML: at 09:49

## 2019-11-26 RX ADMIN — SODIUM CHLORIDE: 9 INJECTION, SOLUTION INTRAVENOUS at 05:12

## 2019-11-26 RX ADMIN — ONDANSETRON 4 MG: 2 INJECTION INTRAMUSCULAR; INTRAVENOUS at 03:46

## 2019-11-26 RX ADMIN — VANCOMYCIN HYDROCHLORIDE 1000 MG: 10 INJECTION, POWDER, LYOPHILIZED, FOR SOLUTION INTRAVENOUS at 05:06

## 2019-11-26 ASSESSMENT — PAIN SCALES - GENERAL
PAINLEVEL_OUTOF10: 0

## 2019-11-27 ENCOUNTER — TELEPHONE (OUTPATIENT)
Dept: FAMILY MEDICINE CLINIC | Age: 29
End: 2019-11-27

## 2019-11-29 LAB
BLOOD CULTURE, ROUTINE: NORMAL
CULTURE, BLOOD 2: NORMAL

## 2020-02-28 ENCOUNTER — ANESTHESIA EVENT (OUTPATIENT)
Dept: OPERATING ROOM | Age: 30
End: 2020-02-28
Payer: COMMERCIAL

## 2020-03-02 ENCOUNTER — HOSPITAL ENCOUNTER (OUTPATIENT)
Age: 30
Setting detail: OBSERVATION
Discharge: HOME | End: 2020-03-04
Attending: SURGERY | Admitting: SURGERY
Payer: COMMERCIAL

## 2020-03-02 ENCOUNTER — ANESTHESIA (OUTPATIENT)
Dept: OPERATING ROOM | Age: 30
End: 2020-03-02
Payer: COMMERCIAL

## 2020-03-02 VITALS
TEMPERATURE: 99.3 F | OXYGEN SATURATION: 100 % | SYSTOLIC BLOOD PRESSURE: 112 MMHG | DIASTOLIC BLOOD PRESSURE: 64 MMHG | RESPIRATION RATE: 11 BRPM

## 2020-03-02 PROBLEM — C50.511 BREAST CANCER OF LOWER-OUTER QUADRANT OF RIGHT FEMALE BREAST (HCC): Status: ACTIVE | Noted: 2020-03-02

## 2020-03-02 LAB
A/G RATIO: 1.8 (ref 1.1–2.2)
ALBUMIN SERPL-MCNC: 4.6 G/DL (ref 3.4–5)
ALP BLD-CCNC: 81 U/L (ref 40–129)
ALT SERPL-CCNC: 34 U/L (ref 10–40)
ANION GAP SERPL CALCULATED.3IONS-SCNC: 13 MMOL/L (ref 3–16)
AST SERPL-CCNC: 38 U/L (ref 15–37)
BILIRUB SERPL-MCNC: 0.5 MG/DL (ref 0–1)
BUN BLDV-MCNC: 11 MG/DL (ref 7–20)
CALCIUM SERPL-MCNC: 9.1 MG/DL (ref 8.3–10.6)
CHLORIDE BLD-SCNC: 100 MMOL/L (ref 99–110)
CO2: 22 MMOL/L (ref 21–32)
CREAT SERPL-MCNC: 0.5 MG/DL (ref 0.6–1.1)
GFR AFRICAN AMERICAN: >60
GFR NON-AFRICAN AMERICAN: >60
GLOBULIN: 2.6 G/DL
GLUCOSE BLD-MCNC: 93 MG/DL (ref 70–99)
HCT VFR BLD CALC: 37.7 % (ref 36–48)
HEMOGLOBIN: 12.9 G/DL (ref 12–16)
MCH RBC QN AUTO: 31.5 PG (ref 26–34)
MCHC RBC AUTO-ENTMCNC: 34.2 G/DL (ref 31–36)
MCV RBC AUTO: 92.1 FL (ref 80–100)
PDW BLD-RTO: 14.1 % (ref 12.4–15.4)
PLATELET # BLD: 259 K/UL (ref 135–450)
PMV BLD AUTO: 8.5 FL (ref 5–10.5)
POTASSIUM SERPL-SCNC: 4.7 MMOL/L (ref 3.5–5.1)
PREGNANCY, URINE: NEGATIVE
RBC # BLD: 4.1 M/UL (ref 4–5.2)
SODIUM BLD-SCNC: 135 MMOL/L (ref 136–145)
TOTAL PROTEIN: 7.2 G/DL (ref 6.4–8.2)
WBC # BLD: 7.2 K/UL (ref 4–11)

## 2020-03-02 PROCEDURE — 2709999900 HC NON-CHARGEABLE SUPPLY: Performed by: SURGERY

## 2020-03-02 PROCEDURE — 2580000003 HC RX 258: Performed by: ANESTHESIOLOGY

## 2020-03-02 PROCEDURE — 6360000002 HC RX W HCPCS: Performed by: ANESTHESIOLOGY

## 2020-03-02 PROCEDURE — 2580000003 HC RX 258: Performed by: SURGERY

## 2020-03-02 PROCEDURE — 3700000000 HC ANESTHESIA ATTENDED CARE: Performed by: SURGERY

## 2020-03-02 PROCEDURE — 6360000002 HC RX W HCPCS: Performed by: SURGERY

## 2020-03-02 PROCEDURE — 6360000002 HC RX W HCPCS

## 2020-03-02 PROCEDURE — 6360000002 HC RX W HCPCS: Performed by: NURSE ANESTHETIST, CERTIFIED REGISTERED

## 2020-03-02 PROCEDURE — 85027 COMPLETE CBC AUTOMATED: CPT

## 2020-03-02 PROCEDURE — 2500000003 HC RX 250 WO HCPCS: Performed by: SURGERY

## 2020-03-02 PROCEDURE — 2500000003 HC RX 250 WO HCPCS: Performed by: NURSE ANESTHETIST, CERTIFIED REGISTERED

## 2020-03-02 PROCEDURE — 3600000014 HC SURGERY LEVEL 4 ADDTL 15MIN: Performed by: SURGERY

## 2020-03-02 PROCEDURE — 7100000000 HC PACU RECOVERY - FIRST 15 MIN: Performed by: SURGERY

## 2020-03-02 PROCEDURE — 6370000000 HC RX 637 (ALT 250 FOR IP): Performed by: SURGERY

## 2020-03-02 PROCEDURE — 80053 COMPREHEN METABOLIC PANEL: CPT

## 2020-03-02 PROCEDURE — 2500000003 HC RX 250 WO HCPCS: Performed by: PLASTIC SURGERY

## 2020-03-02 PROCEDURE — 7100000001 HC PACU RECOVERY - ADDTL 15 MIN: Performed by: SURGERY

## 2020-03-02 PROCEDURE — C2626 INFUSION PUMP, NON-PROG,TEMP: HCPCS | Performed by: SURGERY

## 2020-03-02 PROCEDURE — 3600000004 HC SURGERY LEVEL 4 BASE: Performed by: SURGERY

## 2020-03-02 PROCEDURE — 3700000001 HC ADD 15 MINUTES (ANESTHESIA): Performed by: SURGERY

## 2020-03-02 PROCEDURE — 84703 CHORIONIC GONADOTROPIN ASSAY: CPT

## 2020-03-02 PROCEDURE — 88307 TISSUE EXAM BY PATHOLOGIST: CPT

## 2020-03-02 PROCEDURE — 2580000003 HC RX 258: Performed by: PLASTIC SURGERY

## 2020-03-02 PROCEDURE — 2780000010 HC IMPLANT OTHER: Performed by: SURGERY

## 2020-03-02 PROCEDURE — 88342 IMHCHEM/IMCYTCHM 1ST ANTB: CPT

## 2020-03-02 DEVICE — GRAFT HUM TISS M THK1.2-2MM M PERF CNTOUR ACELLULAR DERM: Type: IMPLANTABLE DEVICE | Site: BREAST | Status: FUNCTIONAL

## 2020-03-02 DEVICE — SMOOTH, HIGH PROFILE, SUTURE TABS, INTEGRAL INJECTION DOME, 500CC
Type: IMPLANTABLE DEVICE | Site: BREAST | Status: FUNCTIONAL
Brand: ARTOURA BREAST TISSUE EXPANDER

## 2020-03-02 RX ORDER — ONDANSETRON 2 MG/ML
INJECTION INTRAMUSCULAR; INTRAVENOUS PRN
Status: DISCONTINUED | OUTPATIENT
Start: 2020-03-02 | End: 2020-03-02 | Stop reason: SDUPTHER

## 2020-03-02 RX ORDER — SODIUM CHLORIDE 0.9 % (FLUSH) 0.9 %
10 SYRINGE (ML) INJECTION EVERY 12 HOURS SCHEDULED
Status: DISCONTINUED | OUTPATIENT
Start: 2020-03-02 | End: 2020-03-04 | Stop reason: HOSPADM

## 2020-03-02 RX ORDER — BUPIVACAINE HYDROCHLORIDE 2.5 MG/ML
INJECTION, SOLUTION EPIDURAL; INFILTRATION; INTRACAUDAL PRN
Status: DISCONTINUED | OUTPATIENT
Start: 2020-03-02 | End: 2020-03-02 | Stop reason: HOSPADM

## 2020-03-02 RX ORDER — HYDRALAZINE HYDROCHLORIDE 20 MG/ML
5 INJECTION INTRAMUSCULAR; INTRAVENOUS EVERY 10 MIN PRN
Status: DISCONTINUED | OUTPATIENT
Start: 2020-03-02 | End: 2020-03-02 | Stop reason: HOSPADM

## 2020-03-02 RX ORDER — PROMETHAZINE HYDROCHLORIDE 25 MG/1
12.5 TABLET ORAL EVERY 6 HOURS PRN
Status: DISCONTINUED | OUTPATIENT
Start: 2020-03-02 | End: 2020-03-04 | Stop reason: HOSPADM

## 2020-03-02 RX ORDER — PROPOFOL 10 MG/ML
INJECTION, EMULSION INTRAVENOUS PRN
Status: DISCONTINUED | OUTPATIENT
Start: 2020-03-02 | End: 2020-03-02 | Stop reason: SDUPTHER

## 2020-03-02 RX ORDER — MAGNESIUM HYDROXIDE 1200 MG/15ML
LIQUID ORAL CONTINUOUS PRN
Status: COMPLETED | OUTPATIENT
Start: 2020-03-02 | End: 2020-03-02

## 2020-03-02 RX ORDER — ROCURONIUM BROMIDE 10 MG/ML
INJECTION, SOLUTION INTRAVENOUS PRN
Status: DISCONTINUED | OUTPATIENT
Start: 2020-03-02 | End: 2020-03-02 | Stop reason: SDUPTHER

## 2020-03-02 RX ORDER — DIPHENHYDRAMINE HYDROCHLORIDE 50 MG/ML
INJECTION INTRAMUSCULAR; INTRAVENOUS PRN
Status: DISCONTINUED | OUTPATIENT
Start: 2020-03-02 | End: 2020-03-02 | Stop reason: SDUPTHER

## 2020-03-02 RX ORDER — DEXAMETHASONE SODIUM PHOSPHATE 4 MG/ML
INJECTION, SOLUTION INTRA-ARTICULAR; INTRALESIONAL; INTRAMUSCULAR; INTRAVENOUS; SOFT TISSUE PRN
Status: DISCONTINUED | OUTPATIENT
Start: 2020-03-02 | End: 2020-03-02 | Stop reason: SDUPTHER

## 2020-03-02 RX ORDER — MIDAZOLAM HYDROCHLORIDE 1 MG/ML
INJECTION INTRAMUSCULAR; INTRAVENOUS PRN
Status: DISCONTINUED | OUTPATIENT
Start: 2020-03-02 | End: 2020-03-02 | Stop reason: SDUPTHER

## 2020-03-02 RX ORDER — LABETALOL 20 MG/4 ML (5 MG/ML) INTRAVENOUS SYRINGE
5 EVERY 10 MIN PRN
Status: DISCONTINUED | OUTPATIENT
Start: 2020-03-02 | End: 2020-03-02 | Stop reason: HOSPADM

## 2020-03-02 RX ORDER — LIDOCAINE HYDROCHLORIDE 20 MG/ML
INJECTION, SOLUTION INTRAVENOUS PRN
Status: DISCONTINUED | OUTPATIENT
Start: 2020-03-02 | End: 2020-03-02 | Stop reason: SDUPTHER

## 2020-03-02 RX ORDER — OXYCODONE HYDROCHLORIDE AND ACETAMINOPHEN 5; 325 MG/1; MG/1
1 TABLET ORAL
Status: DISCONTINUED | OUTPATIENT
Start: 2020-03-02 | End: 2020-03-02 | Stop reason: HOSPADM

## 2020-03-02 RX ORDER — SODIUM CHLORIDE 9 MG/ML
INJECTION INTRAVENOUS PRN
Status: DISCONTINUED | OUTPATIENT
Start: 2020-03-02 | End: 2020-03-02 | Stop reason: HOSPADM

## 2020-03-02 RX ORDER — NEOSTIGMINE METHYLSULFATE 5 MG/5 ML
SYRINGE (ML) INTRAVENOUS PRN
Status: DISCONTINUED | OUTPATIENT
Start: 2020-03-02 | End: 2020-03-02 | Stop reason: SDUPTHER

## 2020-03-02 RX ORDER — MORPHINE SULFATE 2 MG/ML
4 INJECTION, SOLUTION INTRAMUSCULAR; INTRAVENOUS
Status: DISCONTINUED | OUTPATIENT
Start: 2020-03-02 | End: 2020-03-04 | Stop reason: HOSPADM

## 2020-03-02 RX ORDER — HYDROMORPHONE HCL 110MG/55ML
PATIENT CONTROLLED ANALGESIA SYRINGE INTRAVENOUS PRN
Status: DISCONTINUED | OUTPATIENT
Start: 2020-03-02 | End: 2020-03-02 | Stop reason: SDUPTHER

## 2020-03-02 RX ORDER — MORPHINE SULFATE 2 MG/ML
2 INJECTION, SOLUTION INTRAMUSCULAR; INTRAVENOUS
Status: DISCONTINUED | OUTPATIENT
Start: 2020-03-02 | End: 2020-03-03

## 2020-03-02 RX ORDER — HYDROCODONE BITARTRATE AND ACETAMINOPHEN 5; 325 MG/1; MG/1
2 TABLET ORAL EVERY 4 HOURS PRN
Status: DISCONTINUED | OUTPATIENT
Start: 2020-03-02 | End: 2020-03-03

## 2020-03-02 RX ORDER — ONDANSETRON 2 MG/ML
4 INJECTION INTRAMUSCULAR; INTRAVENOUS EVERY 6 HOURS PRN
Status: DISCONTINUED | OUTPATIENT
Start: 2020-03-02 | End: 2020-03-04 | Stop reason: HOSPADM

## 2020-03-02 RX ORDER — METHYLENE BLUE 10 MG/ML
INJECTION INTRAVENOUS PRN
Status: DISCONTINUED | OUTPATIENT
Start: 2020-03-02 | End: 2020-03-02 | Stop reason: HOSPADM

## 2020-03-02 RX ORDER — LORAZEPAM 2 MG/ML
0.5 INJECTION INTRAMUSCULAR ONCE
Status: COMPLETED | OUTPATIENT
Start: 2020-03-02 | End: 2020-03-02

## 2020-03-02 RX ORDER — LORAZEPAM 2 MG/ML
0.5 INJECTION INTRAMUSCULAR
Status: COMPLETED | OUTPATIENT
Start: 2020-03-02 | End: 2020-03-02

## 2020-03-02 RX ORDER — HYDROCODONE BITARTRATE AND ACETAMINOPHEN 5; 325 MG/1; MG/1
1 TABLET ORAL EVERY 4 HOURS PRN
Status: DISCONTINUED | OUTPATIENT
Start: 2020-03-02 | End: 2020-03-03

## 2020-03-02 RX ORDER — SODIUM CHLORIDE 0.9 % (FLUSH) 0.9 %
10 SYRINGE (ML) INJECTION PRN
Status: DISCONTINUED | OUTPATIENT
Start: 2020-03-02 | End: 2020-03-04 | Stop reason: HOSPADM

## 2020-03-02 RX ORDER — SODIUM CHLORIDE, SODIUM LACTATE, POTASSIUM CHLORIDE, CALCIUM CHLORIDE 600; 310; 30; 20 MG/100ML; MG/100ML; MG/100ML; MG/100ML
INJECTION, SOLUTION INTRAVENOUS CONTINUOUS
Status: DISCONTINUED | OUTPATIENT
Start: 2020-03-02 | End: 2020-03-02

## 2020-03-02 RX ORDER — DIAZEPAM 5 MG/1
5 TABLET ORAL EVERY 6 HOURS
Status: DISCONTINUED | OUTPATIENT
Start: 2020-03-02 | End: 2020-03-04 | Stop reason: HOSPADM

## 2020-03-02 RX ORDER — FENTANYL CITRATE 50 UG/ML
50 INJECTION, SOLUTION INTRAMUSCULAR; INTRAVENOUS EVERY 5 MIN PRN
Status: DISCONTINUED | OUTPATIENT
Start: 2020-03-02 | End: 2020-03-02 | Stop reason: HOSPADM

## 2020-03-02 RX ORDER — FENTANYL CITRATE 50 UG/ML
INJECTION, SOLUTION INTRAMUSCULAR; INTRAVENOUS PRN
Status: DISCONTINUED | OUTPATIENT
Start: 2020-03-02 | End: 2020-03-02 | Stop reason: SDUPTHER

## 2020-03-02 RX ORDER — PROMETHAZINE HYDROCHLORIDE 25 MG/ML
12.5 INJECTION, SOLUTION INTRAMUSCULAR; INTRAVENOUS EVERY 6 HOURS PRN
Status: DISCONTINUED | OUTPATIENT
Start: 2020-03-02 | End: 2020-03-04 | Stop reason: HOSPADM

## 2020-03-02 RX ORDER — SODIUM CHLORIDE, SODIUM LACTATE, POTASSIUM CHLORIDE, CALCIUM CHLORIDE 600; 310; 30; 20 MG/100ML; MG/100ML; MG/100ML; MG/100ML
INJECTION, SOLUTION INTRAVENOUS CONTINUOUS
Status: DISCONTINUED | OUTPATIENT
Start: 2020-03-02 | End: 2020-03-03

## 2020-03-02 RX ORDER — LORAZEPAM 2 MG/ML
INJECTION INTRAMUSCULAR
Status: COMPLETED
Start: 2020-03-02 | End: 2020-03-02

## 2020-03-02 RX ORDER — FENTANYL CITRATE 50 UG/ML
25 INJECTION, SOLUTION INTRAMUSCULAR; INTRAVENOUS EVERY 5 MIN PRN
Status: DISCONTINUED | OUTPATIENT
Start: 2020-03-02 | End: 2020-03-02 | Stop reason: HOSPADM

## 2020-03-02 RX ORDER — PROMETHAZINE HYDROCHLORIDE 25 MG/ML
6.25 INJECTION, SOLUTION INTRAMUSCULAR; INTRAVENOUS
Status: DISCONTINUED | OUTPATIENT
Start: 2020-03-02 | End: 2020-03-02 | Stop reason: HOSPADM

## 2020-03-02 RX ORDER — GLYCOPYRROLATE 1 MG/5 ML
SYRINGE (ML) INTRAVENOUS PRN
Status: DISCONTINUED | OUTPATIENT
Start: 2020-03-02 | End: 2020-03-02 | Stop reason: SDUPTHER

## 2020-03-02 RX ORDER — ONDANSETRON 2 MG/ML
4 INJECTION INTRAMUSCULAR; INTRAVENOUS
Status: DISCONTINUED | OUTPATIENT
Start: 2020-03-02 | End: 2020-03-02 | Stop reason: HOSPADM

## 2020-03-02 RX ADMIN — SODIUM CHLORIDE, SODIUM LACTATE, POTASSIUM CHLORIDE, AND CALCIUM CHLORIDE: 600; 310; 30; 20 INJECTION, SOLUTION INTRAVENOUS at 14:37

## 2020-03-02 RX ADMIN — PROPOFOL 300 MG: 10 INJECTION, EMULSION INTRAVENOUS at 11:17

## 2020-03-02 RX ADMIN — FENTANYL CITRATE 25 MCG: 50 INJECTION INTRAMUSCULAR; INTRAVENOUS at 15:25

## 2020-03-02 RX ADMIN — SODIUM CHLORIDE, SODIUM LACTATE, POTASSIUM CHLORIDE, AND CALCIUM CHLORIDE: 600; 310; 30; 20 INJECTION, SOLUTION INTRAVENOUS at 11:55

## 2020-03-02 RX ADMIN — ROCURONIUM BROMIDE 30 MG: 10 INJECTION, SOLUTION INTRAVENOUS at 11:18

## 2020-03-02 RX ADMIN — LORAZEPAM 0.5 MG: 2 INJECTION INTRAMUSCULAR at 17:06

## 2020-03-02 RX ADMIN — CEFAZOLIN 2 G: 10 INJECTION, POWDER, FOR SOLUTION INTRAVENOUS; PARENTERAL at 21:11

## 2020-03-02 RX ADMIN — LORAZEPAM 0.5 MG: 2 INJECTION INTRAMUSCULAR; INTRAVENOUS at 17:06

## 2020-03-02 RX ADMIN — ONDANSETRON 4 MG: 2 INJECTION INTRAMUSCULAR; INTRAVENOUS at 12:19

## 2020-03-02 RX ADMIN — ROCURONIUM BROMIDE 30 MG: 10 INJECTION, SOLUTION INTRAVENOUS at 12:19

## 2020-03-02 RX ADMIN — DIAZEPAM 5 MG: 5 TABLET ORAL at 23:30

## 2020-03-02 RX ADMIN — Medication 0.5 MG: at 14:19

## 2020-03-02 RX ADMIN — HYDROMORPHONE HYDROCHLORIDE 1 MG: 2 INJECTION, SOLUTION INTRAMUSCULAR; INTRAVENOUS; SUBCUTANEOUS at 11:54

## 2020-03-02 RX ADMIN — BUPIVACAINE HYDROCHLORIDE 400 ML: 5 INJECTION, SOLUTION PERINEURAL at 15:53

## 2020-03-02 RX ADMIN — Medication 3 MG: at 14:19

## 2020-03-02 RX ADMIN — MORPHINE SULFATE 4 MG: 2 INJECTION, SOLUTION INTRAMUSCULAR; INTRAVENOUS at 19:20

## 2020-03-02 RX ADMIN — FENTANYL CITRATE 25 MCG: 50 INJECTION INTRAMUSCULAR; INTRAVENOUS at 15:41

## 2020-03-02 RX ADMIN — DEXAMETHASONE SODIUM PHOSPHATE 4 MG: 4 INJECTION, SOLUTION INTRAMUSCULAR; INTRAVENOUS at 11:28

## 2020-03-02 RX ADMIN — LIDOCAINE HYDROCHLORIDE 50 MG: 20 INJECTION, SOLUTION INTRAVENOUS at 11:18

## 2020-03-02 RX ADMIN — DIPHENHYDRAMINE HYDROCHLORIDE 10 MG: 50 INJECTION, SOLUTION INTRAMUSCULAR; INTRAVENOUS at 11:28

## 2020-03-02 RX ADMIN — MORPHINE SULFATE 4 MG: 2 INJECTION, SOLUTION INTRAMUSCULAR; INTRAVENOUS at 22:14

## 2020-03-02 RX ADMIN — FENTANYL CITRATE 25 MCG: 50 INJECTION INTRAMUSCULAR; INTRAVENOUS at 15:03

## 2020-03-02 RX ADMIN — LORAZEPAM 0.5 MG: 2 INJECTION INTRAMUSCULAR; INTRAVENOUS at 16:04

## 2020-03-02 RX ADMIN — MIDAZOLAM HYDROCHLORIDE 1 MG: 2 INJECTION, SOLUTION INTRAMUSCULAR; INTRAVENOUS at 11:09

## 2020-03-02 RX ADMIN — SODIUM CHLORIDE, SODIUM LACTATE, POTASSIUM CHLORIDE, AND CALCIUM CHLORIDE: 600; 310; 30; 20 INJECTION, SOLUTION INTRAVENOUS at 09:37

## 2020-03-02 RX ADMIN — Medication 10 ML: at 21:12

## 2020-03-02 RX ADMIN — Medication 0.2 MG: at 11:09

## 2020-03-02 RX ADMIN — HYDROMORPHONE HYDROCHLORIDE 1 MG: 2 INJECTION, SOLUTION INTRAMUSCULAR; INTRAVENOUS; SUBCUTANEOUS at 12:20

## 2020-03-02 RX ADMIN — FENTANYL CITRATE 100 MCG: 50 INJECTION INTRAMUSCULAR; INTRAVENOUS at 11:18

## 2020-03-02 RX ADMIN — HYDROMORPHONE HYDROCHLORIDE 2 MG: 2 INJECTION, SOLUTION INTRAMUSCULAR; INTRAVENOUS; SUBCUTANEOUS at 11:29

## 2020-03-02 RX ADMIN — CEFAZOLIN 2 G: 10 INJECTION, POWDER, FOR SOLUTION INTRAVENOUS at 11:28

## 2020-03-02 RX ADMIN — SODIUM CHLORIDE, SODIUM LACTATE, POTASSIUM CHLORIDE, AND CALCIUM CHLORIDE: 600; 310; 30; 20 INJECTION, SOLUTION INTRAVENOUS at 16:48

## 2020-03-02 RX ADMIN — SODIUM CHLORIDE, SODIUM LACTATE, POTASSIUM CHLORIDE, AND CALCIUM CHLORIDE: 600; 310; 30; 20 INJECTION, SOLUTION INTRAVENOUS at 11:25

## 2020-03-02 RX ADMIN — DIAZEPAM 5 MG: 5 TABLET ORAL at 17:00

## 2020-03-02 RX ADMIN — MIDAZOLAM HYDROCHLORIDE 1 MG: 2 INJECTION, SOLUTION INTRAMUSCULAR; INTRAVENOUS at 11:18

## 2020-03-02 RX ADMIN — FENTANYL CITRATE 25 MCG: 50 INJECTION INTRAMUSCULAR; INTRAVENOUS at 15:10

## 2020-03-02 ASSESSMENT — PULMONARY FUNCTION TESTS
PIF_VALUE: 22
PIF_VALUE: 16
PIF_VALUE: 19
PIF_VALUE: 18
PIF_VALUE: 19
PIF_VALUE: 18
PIF_VALUE: 19
PIF_VALUE: 18
PIF_VALUE: 16
PIF_VALUE: 18
PIF_VALUE: 22
PIF_VALUE: 3
PIF_VALUE: 19
PIF_VALUE: 21
PIF_VALUE: 19
PIF_VALUE: 23
PIF_VALUE: 18
PIF_VALUE: 18
PIF_VALUE: 16
PIF_VALUE: 21
PIF_VALUE: 19
PIF_VALUE: 3
PIF_VALUE: 18
PIF_VALUE: 19
PIF_VALUE: 19
PIF_VALUE: 16
PIF_VALUE: 15
PIF_VALUE: 2
PIF_VALUE: 19
PIF_VALUE: 3
PIF_VALUE: 4
PIF_VALUE: 18
PIF_VALUE: 17
PIF_VALUE: 20
PIF_VALUE: 19
PIF_VALUE: 19
PIF_VALUE: 22
PIF_VALUE: 19
PIF_VALUE: 20
PIF_VALUE: 16
PIF_VALUE: 15
PIF_VALUE: 18
PIF_VALUE: 16
PIF_VALUE: 21
PIF_VALUE: 16
PIF_VALUE: 19
PIF_VALUE: 21
PIF_VALUE: 16
PIF_VALUE: 18
PIF_VALUE: 10
PIF_VALUE: 3
PIF_VALUE: 3
PIF_VALUE: 4
PIF_VALUE: 16
PIF_VALUE: 18
PIF_VALUE: 19
PIF_VALUE: 17
PIF_VALUE: 18
PIF_VALUE: 21
PIF_VALUE: 21
PIF_VALUE: 19
PIF_VALUE: 22
PIF_VALUE: 10
PIF_VALUE: 3
PIF_VALUE: 19
PIF_VALUE: 16
PIF_VALUE: 17
PIF_VALUE: 16
PIF_VALUE: 17
PIF_VALUE: 19
PIF_VALUE: 22
PIF_VALUE: 19
PIF_VALUE: 16
PIF_VALUE: 16
PIF_VALUE: 19
PIF_VALUE: 3
PIF_VALUE: 13
PIF_VALUE: 18
PIF_VALUE: 18
PIF_VALUE: 16
PIF_VALUE: 3
PIF_VALUE: 19
PIF_VALUE: 18
PIF_VALUE: 16
PIF_VALUE: 20
PIF_VALUE: 15
PIF_VALUE: 18
PIF_VALUE: 16
PIF_VALUE: 16
PIF_VALUE: 21
PIF_VALUE: 18
PIF_VALUE: 16
PIF_VALUE: 19
PIF_VALUE: 16
PIF_VALUE: 15
PIF_VALUE: 16
PIF_VALUE: 20
PIF_VALUE: 21
PIF_VALUE: 19
PIF_VALUE: 19
PIF_VALUE: 20
PIF_VALUE: 19
PIF_VALUE: 19
PIF_VALUE: 22
PIF_VALUE: 16
PIF_VALUE: 17
PIF_VALUE: 18
PIF_VALUE: 22
PIF_VALUE: 16
PIF_VALUE: 16
PIF_VALUE: 19
PIF_VALUE: 22
PIF_VALUE: 22
PIF_VALUE: 3
PIF_VALUE: 19
PIF_VALUE: 3
PIF_VALUE: 16
PIF_VALUE: 22
PIF_VALUE: 18
PIF_VALUE: 18
PIF_VALUE: 16
PIF_VALUE: 22
PIF_VALUE: 24
PIF_VALUE: 4
PIF_VALUE: 22
PIF_VALUE: 22
PIF_VALUE: 20
PIF_VALUE: 16
PIF_VALUE: 16
PIF_VALUE: 9
PIF_VALUE: 22
PIF_VALUE: 22
PIF_VALUE: 3
PIF_VALUE: 19
PIF_VALUE: 3
PIF_VALUE: 16
PIF_VALUE: 21
PIF_VALUE: 16
PIF_VALUE: 22
PIF_VALUE: 4
PIF_VALUE: 18
PIF_VALUE: 16
PIF_VALUE: 10
PIF_VALUE: 18
PIF_VALUE: 16
PIF_VALUE: 19
PIF_VALUE: 21
PIF_VALUE: 1
PIF_VALUE: 19
PIF_VALUE: 21
PIF_VALUE: 16
PIF_VALUE: 16
PIF_VALUE: 18
PIF_VALUE: 10
PIF_VALUE: 22
PIF_VALUE: 16
PIF_VALUE: 21
PIF_VALUE: 22
PIF_VALUE: 5
PIF_VALUE: 19
PIF_VALUE: 18
PIF_VALUE: 19
PIF_VALUE: 16
PIF_VALUE: 21
PIF_VALUE: 16
PIF_VALUE: 18
PIF_VALUE: 16
PIF_VALUE: 16
PIF_VALUE: 18
PIF_VALUE: 21
PIF_VALUE: 10
PIF_VALUE: 22
PIF_VALUE: 3
PIF_VALUE: 18
PIF_VALUE: 16
PIF_VALUE: 19
PIF_VALUE: 22
PIF_VALUE: 16
PIF_VALUE: 20
PIF_VALUE: 16
PIF_VALUE: 16
PIF_VALUE: 19
PIF_VALUE: 16
PIF_VALUE: 21
PIF_VALUE: 19
PIF_VALUE: 19
PIF_VALUE: 18
PIF_VALUE: 3
PIF_VALUE: 18
PIF_VALUE: 16
PIF_VALUE: 16
PIF_VALUE: 18
PIF_VALUE: 21
PIF_VALUE: 18
PIF_VALUE: 16
PIF_VALUE: 10
PIF_VALUE: 16
PIF_VALUE: 23
PIF_VALUE: 3
PIF_VALUE: 17
PIF_VALUE: 19
PIF_VALUE: 3
PIF_VALUE: 3
PIF_VALUE: 1
PIF_VALUE: 19
PIF_VALUE: 16
PIF_VALUE: 3
PIF_VALUE: 19
PIF_VALUE: 19
PIF_VALUE: 16
PIF_VALUE: 4
PIF_VALUE: 10
PIF_VALUE: 18
PIF_VALUE: 3
PIF_VALUE: 19
PIF_VALUE: 20
PIF_VALUE: 17
PIF_VALUE: 24
PIF_VALUE: 16
PIF_VALUE: 18
PIF_VALUE: 17
PIF_VALUE: 16
PIF_VALUE: 3
PIF_VALUE: 16
PIF_VALUE: 19
PIF_VALUE: 22
PIF_VALUE: 19
PIF_VALUE: 3
PIF_VALUE: 16
PIF_VALUE: 19
PIF_VALUE: 20
PIF_VALUE: 3
PIF_VALUE: 24
PIF_VALUE: 19
PIF_VALUE: 16
PIF_VALUE: 18
PIF_VALUE: 5
PIF_VALUE: 19
PIF_VALUE: 19
PIF_VALUE: 16
PIF_VALUE: 19
PIF_VALUE: 18
PIF_VALUE: 16
PIF_VALUE: 19
PIF_VALUE: 22
PIF_VALUE: 18
PIF_VALUE: 18
PIF_VALUE: 16
PIF_VALUE: 15
PIF_VALUE: 18
PIF_VALUE: 3
PIF_VALUE: 19
PIF_VALUE: 19
PIF_VALUE: 2
PIF_VALUE: 19
PIF_VALUE: 18
PIF_VALUE: 19
PIF_VALUE: 16
PIF_VALUE: 22
PIF_VALUE: 16
PIF_VALUE: 3
PIF_VALUE: 10
PIF_VALUE: 16
PIF_VALUE: 21

## 2020-03-02 ASSESSMENT — PAIN DESCRIPTION - DESCRIPTORS: DESCRIPTORS: ACHING

## 2020-03-02 ASSESSMENT — PAIN DESCRIPTION - ONSET: ONSET: ON-GOING

## 2020-03-02 ASSESSMENT — PAIN DESCRIPTION - FREQUENCY: FREQUENCY: CONTINUOUS

## 2020-03-02 ASSESSMENT — PAIN DESCRIPTION - PROGRESSION: CLINICAL_PROGRESSION: NOT CHANGED

## 2020-03-02 ASSESSMENT — PAIN SCALES - GENERAL
PAINLEVEL_OUTOF10: 8
PAINLEVEL_OUTOF10: 10
PAINLEVEL_OUTOF10: 4

## 2020-03-02 ASSESSMENT — PAIN DESCRIPTION - ORIENTATION: ORIENTATION: RIGHT;LEFT

## 2020-03-02 ASSESSMENT — PAIN - FUNCTIONAL ASSESSMENT: PAIN_FUNCTIONAL_ASSESSMENT: 0-10

## 2020-03-02 ASSESSMENT — PAIN DESCRIPTION - LOCATION: LOCATION: BREAST

## 2020-03-02 ASSESSMENT — PAIN DESCRIPTION - PAIN TYPE: TYPE: SURGICAL PAIN

## 2020-03-02 NOTE — ANESTHESIA PRE PROCEDURE
estrogen receptor positive (HCC) C50.511, Z17.0    Lethargy R53.83    Acute ethmoidal sinusitis J01.20    Leukocytosis D72.829       Past Medical History:        Diagnosis Date    Breast CA ([address]     right breast    Thyroid disease     hypothyroid       Past Surgical History:        Procedure Laterality Date    BREAST SURGERY      core bx     SECTION      CHOLECYSTECTOMY  2016    PORT SURGERY N/A 10/16/2019    SINGLE LUMEN PORT-A-CATHETER INSERTION LEFT SUBCLAVIAN performed by Cathie Montalvo MD at [address] EXTRACTION         Social History:    Social History     Tobacco Use    Smoking status: Never Smoker    Smokeless tobacco: Never Used   Substance Use Topics    Alcohol use: Yes     Comment: occasionally                                Counseling given: Not Answered      Vital Signs (Current): There were no vitals filed for this visit.                                            BP Readings from Last 3 Encounters:   20 120/79   19 (!) 105/57   10/16/19 (!) 145/83       NPO Status:                                                                                 BMI:   Wt Readings from Last 3 Encounters:   20 189 lb (85.7 kg)   19 165 lb (74.8 kg)   10/16/19 165 lb (74.8 kg)     There is no height or weight on file to calculate BMI.    CBC:   Lab Results   Component Value Date    WBC 8.9 2019    RBC 3.48 2019    HGB 10.7 2019    HCT 31.5 2019    MCV 90.4 2019    RDW 15.3 2019     2019       CMP:   Lab Results   Component Value Date     2019    K 4.0 2019    K 4.2 2019     2019    CO2 26 2019    BUN 20 2019    CREATININE <0.5 2019    GFRAA >60 2019    AGRATIO 1.5 10/16/2019    LABGLOM >60 2019    GLUCOSE 99 2019    PROT 6.2 2019    CALCIUM 8.3 2019    BILITOT <0.2 2019    ALKPHOS 139 2019    AST 9 2019    ALT 11 11/24/2019       POC Tests: No results for input(s): POCGLU, POCNA, POCK, POCCL, POCBUN, POCHEMO, POCHCT in the last 72 hours. Coags: No results found for: PROTIME, INR, APTT    HCG (If Applicable):   Lab Results   Component Value Date    PREGTESTUR Negative 03/02/2020        ABGs: No results found for: PHART, PO2ART, JNS6WOP, BJS3HDL, BEART, Q0YHHJFI     Type & Screen (If Applicable):  No results found for: LABABO, [address]    Anesthesia Evaluation  Patient summary reviewed and Nursing notes reviewed no history of anesthetic complications:   Airway: Mallampati: I  TM distance: >3 FB   Neck ROM: full  Mouth opening: > = 3 FB Dental: normal exam         Pulmonary:Negative Pulmonary ROS breath sounds clear to auscultation                             Cardiovascular:Negative CV ROS            Rhythm: regular  Rate: normal                    Neuro/Psych:   Negative Neuro/Psych ROS              GI/Hepatic/Renal:             Endo/Other:    (+) hypothyroidism::., malignancy/cancer. ROS comment: Breast CA-starting chemo next week Abdominal:           Vascular: negative vascular ROS. Anesthesia Plan      general     ASA 3       Induction: intravenous. MIPS: Postoperative opioids intended and Prophylactic antiemetics administered. Anesthetic plan and risks discussed with patient. Plan discussed with CRNA.                   Barrett Willams DO   3/2/2020

## 2020-03-02 NOTE — H&P
History     Socioeconomic History    Marital status:      Spouse name: Not on file    Number of children: Not on file    Years of education: Not on file    Highest education level: Not on file   Occupational History    Not on file   Social Needs    Financial resource strain: Not on file    Food insecurity:     Worry: Not on file     Inability: Not on file    Transportation needs:     Medical: Not on file     Non-medical: Not on file   Tobacco Use    Smoking status: Never Smoker    Smokeless tobacco: Never Used   Substance and Sexual Activity    Alcohol use: Yes     Comment: occasionally    Drug use: No    Sexual activity: Yes     Partners: Male   Lifestyle    Physical activity:     Days per week: Not on file     Minutes per session: Not on file    Stress: Not on file   Relationships    Social connections:     Talks on phone: Not on file     Gets together: Not on file     Attends [employer] service: Not on file     Active member of club or organization: Not on file     Attends meetings of clubs or organizations: Not on file     Relationship status: Not on file    Intimate partner violence:     Fear of current or ex partner: Not on file     Emotionally abused: Not on file     Physically abused: Not on file     Forced sexual activity: Not on file   Other Topics Concern    Not on file   Social History Narrative    Not on file         Family History:       Problem Relation Age of Onset    Cancer Paternal Grandmother         breast    Thyroid Disease Paternal Grandmother          REVIEW OF SYSTEMS:    Constitutional: Negative for chills, fatigue and fever. HENT: Negative for loss of hearing   Eyes: Negative for visual disturbance. Respiratory: Negative for  cough, chest tightness, shortness of breath and wheezing. Cardiovascular: Negative for chest pain, palpitations and exertional chest pressure  Gastrointestinal: Negative for constipation, diarrhea, nausea and vomiting. Musculoskeletal: Negative for gait problem, and joint swelling. Skin: Negative for rash and nonhealing wounds. Neurological: Negative for dizziness, syncope, light-headedness,    Psychiatric/Behavioral: Negative for agitation    PHYSICAL EXAM:      /79   Pulse 83   Temp 98.2 °F (36.8 °C) (Oral)   Resp 16   Ht 5' 4\" (1.626 m)   Wt 189 lb (85.7 kg)   SpO2 98%   BMI 32.44 kg/m²  I      Eyes:  pupils equal, round and reactive to light and conjunctiva normal    Head/ENT:  Normocephalic, without obvious abnormality, atramatic,     Neck:  Supple, symmetrical, trachea midline, no adenopathy, thyroid symmetric, not enlarged and no tenderness, skin warm and dry. Heart: regular rate and rhythm    Lungs:  No increased work of breathing, good air exchange, clear to auscultation bilaterally, no crackles or wheezing    Abdomen:  Normal bowel sounds, soft, non-distended, non-tender, no masses palpated    Extremities:  No clubbing, cyanosis, or edema      ASSESSMENT AND PLAN:    1. Patient is a 30 y.o. female with above specified procedure planned.         Ben Macario   3/2/2020

## 2020-03-02 NOTE — H&P
Date of Surgery Update:  Cody Castro was seen, history and physical examination reviewed, and patient examined by me today. There have been no significant clinical changes since the completion of the previous history and physical.    The risk, benefits, and alternatives of the proposed procedure have been explained to the patient (or appropriate guardian) and understanding verbalized. All questions answered. Patient wishes to proceed.     Electronically signed by: Diana Miller MD,3/2/2020,12:28 AM

## 2020-03-03 PROBLEM — C50.511 MALIGNANT NEOPLASM OF LOWER-OUTER QUADRANT OF RIGHT FEMALE BREAST (HCC): Status: ACTIVE | Noted: 2020-03-03

## 2020-03-03 PROCEDURE — G0378 HOSPITAL OBSERVATION PER HR: HCPCS

## 2020-03-03 PROCEDURE — 96374 THER/PROPH/DIAG INJ IV PUSH: CPT

## 2020-03-03 PROCEDURE — 6360000002 HC RX W HCPCS: Performed by: SURGERY

## 2020-03-03 PROCEDURE — 6370000000 HC RX 637 (ALT 250 FOR IP): Performed by: SURGERY

## 2020-03-03 PROCEDURE — 2580000003 HC RX 258: Performed by: SURGERY

## 2020-03-03 RX ORDER — OXYCODONE HYDROCHLORIDE AND ACETAMINOPHEN 5; 325 MG/1; MG/1
1 TABLET ORAL EVERY 4 HOURS PRN
Status: DISCONTINUED | OUTPATIENT
Start: 2020-03-03 | End: 2020-03-04 | Stop reason: HOSPADM

## 2020-03-03 RX ORDER — OXYCODONE HYDROCHLORIDE AND ACETAMINOPHEN 5; 325 MG/1; MG/1
2 TABLET ORAL EVERY 4 HOURS PRN
Status: DISCONTINUED | OUTPATIENT
Start: 2020-03-03 | End: 2020-03-04 | Stop reason: HOSPADM

## 2020-03-03 RX ORDER — DOCUSATE SODIUM 100 MG/1
100 CAPSULE, LIQUID FILLED ORAL DAILY
Status: DISCONTINUED | OUTPATIENT
Start: 2020-03-03 | End: 2020-03-04 | Stop reason: HOSPADM

## 2020-03-03 RX ORDER — HYDROCODONE BITARTRATE AND ACETAMINOPHEN 5; 325 MG/1; MG/1
1 TABLET ORAL EVERY 4 HOURS PRN
Qty: 20 TABLET | Refills: 0 | Status: SHIPPED | OUTPATIENT
Start: 2020-03-03 | End: 2020-03-04 | Stop reason: HOSPADM

## 2020-03-03 RX ADMIN — CEFAZOLIN 2 G: 10 INJECTION, POWDER, FOR SOLUTION INTRAVENOUS; PARENTERAL at 21:45

## 2020-03-03 RX ADMIN — MORPHINE SULFATE 2 MG: 2 INJECTION, SOLUTION INTRAMUSCULAR; INTRAVENOUS at 04:02

## 2020-03-03 RX ADMIN — Medication 10 ML: at 21:48

## 2020-03-03 RX ADMIN — CEFAZOLIN 2 G: 10 INJECTION, POWDER, FOR SOLUTION INTRAVENOUS; PARENTERAL at 13:08

## 2020-03-03 RX ADMIN — DIAZEPAM 5 MG: 5 TABLET ORAL at 05:42

## 2020-03-03 RX ADMIN — OXYCODONE HYDROCHLORIDE AND ACETAMINOPHEN 2 TABLET: 5; 325 TABLET ORAL at 09:27

## 2020-03-03 RX ADMIN — DIAZEPAM 5 MG: 5 TABLET ORAL at 23:38

## 2020-03-03 RX ADMIN — DIAZEPAM 5 MG: 5 TABLET ORAL at 16:48

## 2020-03-03 RX ADMIN — DIAZEPAM 5 MG: 5 TABLET ORAL at 11:29

## 2020-03-03 RX ADMIN — OXYCODONE HYDROCHLORIDE AND ACETAMINOPHEN 2 TABLET: 5; 325 TABLET ORAL at 19:34

## 2020-03-03 RX ADMIN — Medication 10 ML: at 11:30

## 2020-03-03 RX ADMIN — OXYCODONE HYDROCHLORIDE AND ACETAMINOPHEN 2 TABLET: 5; 325 TABLET ORAL at 14:03

## 2020-03-03 RX ADMIN — CEFAZOLIN 2 G: 10 INJECTION, POWDER, FOR SOLUTION INTRAVENOUS; PARENTERAL at 02:41

## 2020-03-03 RX ADMIN — DOCUSATE SODIUM 100 MG: 100 CAPSULE, LIQUID FILLED ORAL at 14:04

## 2020-03-03 RX ADMIN — OXYCODONE HYDROCHLORIDE AND ACETAMINOPHEN 2 TABLET: 5; 325 TABLET ORAL at 23:38

## 2020-03-03 RX ADMIN — HYDROCODONE BITARTRATE AND ACETAMINOPHEN 2 TABLET: 5; 325 TABLET ORAL at 00:44

## 2020-03-03 ASSESSMENT — PAIN DESCRIPTION - LOCATION
LOCATION: BREAST;ABDOMEN
LOCATION: BREAST;ABDOMEN
LOCATION: BREAST

## 2020-03-03 ASSESSMENT — PAIN SCALES - GENERAL
PAINLEVEL_OUTOF10: 7
PAINLEVEL_OUTOF10: 7
PAINLEVEL_OUTOF10: 6
PAINLEVEL_OUTOF10: 8
PAINLEVEL_OUTOF10: 7
PAINLEVEL_OUTOF10: 7

## 2020-03-03 ASSESSMENT — PAIN DESCRIPTION - ONSET
ONSET: ON-GOING

## 2020-03-03 ASSESSMENT — PAIN DESCRIPTION - ORIENTATION
ORIENTATION: RIGHT;LEFT

## 2020-03-03 ASSESSMENT — PAIN DESCRIPTION - PROGRESSION
CLINICAL_PROGRESSION: NOT CHANGED
CLINICAL_PROGRESSION: NOT CHANGED

## 2020-03-03 ASSESSMENT — PAIN DESCRIPTION - DESCRIPTORS
DESCRIPTORS: TENDER
DESCRIPTORS: ACHING
DESCRIPTORS: SORE
DESCRIPTORS: CRAMPING;TENDER
DESCRIPTORS: ACHING

## 2020-03-03 ASSESSMENT — PAIN DESCRIPTION - FREQUENCY
FREQUENCY: CONTINUOUS

## 2020-03-03 ASSESSMENT — PAIN DESCRIPTION - PAIN TYPE
TYPE: SURGICAL PAIN

## 2020-03-03 ASSESSMENT — PAIN - FUNCTIONAL ASSESSMENT: PAIN_FUNCTIONAL_ASSESSMENT: PREVENTS OR INTERFERES SOME ACTIVE ACTIVITIES AND ADLS

## 2020-03-03 NOTE — PLAN OF CARE
Problem: Pain:  Goal: Pain level will decrease  Description  Pain level will decrease  Outcome: Ongoing  Note:   Pt requesting pain medication to stay ahead of the pain. Given percocet approximately q6hrs. Percocet alleviates the pain. Will continue to monitor     Problem: Falls - Risk of:  Goal: Will remain free from falls  Description  Will remain free from falls  Outcome: Ongoing  Note:   Steady gait w/ walking (no aids necessary). Ambulated in halls w/ family today and up to chair. Pt calls appropriately for needs. Family at bedside to help. Bed wheels locked, bed in lowest position.  Will monitor

## 2020-03-03 NOTE — DISCHARGE SUMMARY
Physician Discharge Summary     Patient ID:  Emerald Diaz  [id number]  89 y.o.  1990    Admit date: 3/2/2020    Discharge date and time: 3/4/2020 12:57 PM     Admitting Physician: Samia Hall MD     Discharge Physician: Samia Hall MD     Admission Diagnoses: right breast cancer, s/p neoadjuvant chemotherapy    Discharge Diagnoses: same     PMH:  has a past medical history of Breast CA ([address] and Thyroid disease. PSH:  has a past surgical history that includes [place] tooth extraction;  section; Cholecystectomy (); Breast surgery; [address] Surgery (N/A, 10/16/2019); Mastectomy (Bilateral, 3/2/2020); and Breast enhancement surgery (Bilateral, 3/2/2020). Allergies: Adhesive tape and No known allergies    Admission Condition: good    Discharged Condition: fair    Indication for Admission: right breast cancer    Hospital Course:   Patient is a 30year old female with a past medical history as delineated below. Patient felt a breast mass and underwent workup which demonstrated right breast cancer. The patient has undergone sharee adjuvant therapy and presents today for the above procedure. Patient underwent:Right simple mastectomy with sentinel node biopsy, Left simple mastectomy  Post op:Patient complaining of breakthrough pain during post-op visit. She is quite drowsy during exam. Patient has yet to void independently and was straight catheterized x1 in the PACU. POD 1 -No acute events overnight, complaining of some chest tightness but over all controlled, tolerating diet, no N/V.  POD 2 - Pain is markedly improved. Ambulating. Tolerating diet. Voiding freely. ALEJANDRO teaching.  Patient discharged home in stable condition    Discharge Physical Exam:  Aj Cones in bed in no acute distress  Neuro: A&Ox3, no focal deficits, sensation intact  Lungs: Breathing room air, normal effort  Chest: Surgical bra in place, opened for exam. Incision site with surgical dressing intact, clean,

## 2020-03-03 NOTE — OP NOTE
[address], [address]                                OPERATIVE REPORT    PATIENT NAME: Shaun Paulino               :        1990  MED REC NO:   [id number]                          ROOM:       5304  ACCOUNT NO:   [de-identified]                           ADMIT DATE: 2020  PROVIDER:     Max Pruett. Jarek Moore MD    DATE OF PROCEDURE:  2020    PREOPERATIVE DIAGNOSES:  1. Personal history of right breast cancer. 2.  Status post bilateral mastectomies. POSTOPERATIVE DIAGNOSES:  1. Personal history of right breast cancer. 2.  Status post bilateral mastectomies. OPERATION PERFORMED:  1.  Bilateral first-stage breast reconstruction (with submuscular  placement of bilateral tissue expanders). 2.  Bilateral 132 cm2 AlloDerm grafting (to cover lateral and inferior  portions of tissue expanders). SURGEON:  Jeison Flores MD    ANESTHESIA:  General.    ESTIMATED BLOOD LOSS:  45 mL. EXPANDERS USED:  Kerrick Artoura Smooth, High Profile 500 mL tissue  expanders; each tissue expander filled with 125 mL normal saline. DRAINS:  15 mm ALEJANDRO x2. DISPOSITION:  PACU. HISTORY:  This is a 67-year-old female who was scheduled to undergo  bilateral mastectomies with Dr. Kendy Christian. She presented to my  office. We discussed various forms of breast reconstruction. The risks  and activity restrictions associated with the above-listed procedures  were explained to the patient. All of her questions were answered. Informed consent was obtained. DETAILS OF PROCEDURE:  The patient was marked in the sitting position in  the preoperative holding area and then taken back to the operating room. She was placed supine on the operating room table and given general  anesthesia. An airway was placed. She was prepped and draped in  sterile fashion.     Dr. Rosangela Bermudez performed bilateral mastectomies with right MD Duglas Umanzor, #N/A       MD Andreina Bergman.  MD Josie Galicia MD

## 2020-03-03 NOTE — CARE COORDINATION
Case Management Assessment           Initial Evaluation                Date / Time of Evaluation: 3/3/2020 5:25 PM                 Assessment Completed by: Rachael Courtney    Patient Name: Yuan Soriano     YOB: 1990  Diagnosis: Malignant neoplasm of right female breast, unspecified estrogen receptor status, unspecified site of breast ([facility] [C50.911]  Breast cancer of lower-outer quadrant of right female breast ([facility] [C50.511]  Malignant neoplasm of lower-outer quadrant of right female breast [facility]) Tiffany Tolentinoer     Date / Time: 3/2/2020  8:48 AM    Patient Admission Status: Observation    If patient is discharged prior to next notation, then this note serves as note for discharge by case management. Current PCP: BILLY Villa CNP  Clinic Patient: No    Chart Reviewed: Yes  Patient/ Family Interviewed: No    Initial assessment completed at bedside with: reviewed per chart    Hospitalization in the last 30 days: No    Emergency Contacts:  Extended Emergency Contact Information  Primary Emergency Contact: Dominguez Lagos  Address: [address], [address] [place] [address] Phone: [phone]  Relation: Spouse    Advance Directives:   Code Status: Full [address]: No    Financial  Payor: Azar / Plan: [place] PPO / Product Type: *No Product type* /     Pre-cert required for SNF: Yes    Pharmacy    [facility] [address], [address] [facility]  [address] [address]  Phone: [phone] Fax: [phone]      Potential assistance Purchasing Medications: Potential Assistance Purchasing Medications: No  Does Patient want to participate in local refill/ meds to beds program?: No    Meds To Beds General Rules:  1. Can ONLY be done Monday- Friday between 8:30am-5pm  2. Prescription(s) must be in pharmacy by 3pm to be filled same day  3. Copy of right female breast ([facility] [C50.511]  Malignant neoplasm of lower-outer quadrant of right female breast ([facility] [C50.511]    The Patient and/or patient representative Benito Meneses and her family were provided with a choice of provider and agrees with the discharge plan Yes    Freedom of choice list was provided with basic dialogue that supports the patient's individualized plan of care/goals and shares the quality data associated with the providers.  Not Indicated      Care Transition patient: No    Nubia Tolentino RN  [facility]  Case Management Department  Ph: [phone]   Fax: [phone]

## 2020-03-04 VITALS
BODY MASS INDEX: 32.27 KG/M2 | SYSTOLIC BLOOD PRESSURE: 111 MMHG | OXYGEN SATURATION: 98 % | HEART RATE: 91 BPM | RESPIRATION RATE: 18 BRPM | WEIGHT: 189 LBS | TEMPERATURE: 97.5 F | DIASTOLIC BLOOD PRESSURE: 73 MMHG | HEIGHT: 64 IN

## 2020-03-04 PROCEDURE — 2580000003 HC RX 258: Performed by: SURGERY

## 2020-03-04 PROCEDURE — G0378 HOSPITAL OBSERVATION PER HR: HCPCS

## 2020-03-04 PROCEDURE — 96375 TX/PRO/DX INJ NEW DRUG ADDON: CPT

## 2020-03-04 PROCEDURE — 6360000002 HC RX W HCPCS: Performed by: SURGERY

## 2020-03-04 PROCEDURE — 6370000000 HC RX 637 (ALT 250 FOR IP): Performed by: SURGERY

## 2020-03-04 RX ORDER — DOCUSATE SODIUM 100 MG/1
100 CAPSULE, LIQUID FILLED ORAL DAILY PRN
Qty: 10 CAPSULE | Refills: 0 | Status: SHIPPED | OUTPATIENT
Start: 2020-03-04 | End: 2020-07-22

## 2020-03-04 RX ORDER — OXYCODONE HYDROCHLORIDE AND ACETAMINOPHEN 5; 325 MG/1; MG/1
1 TABLET ORAL EVERY 6 HOURS PRN
Qty: 20 TABLET | Refills: 0 | Status: SHIPPED | OUTPATIENT
Start: 2020-03-04 | End: 2020-03-04 | Stop reason: SDUPTHER

## 2020-03-04 RX ORDER — DIAZEPAM 5 MG/1
5 TABLET ORAL EVERY 6 HOURS PRN
Qty: 40 TABLET | Refills: 0 | Status: SHIPPED | OUTPATIENT
Start: 2020-03-04 | End: 2020-03-14

## 2020-03-04 RX ORDER — OXYCODONE HYDROCHLORIDE AND ACETAMINOPHEN 5; 325 MG/1; MG/1
1 TABLET ORAL EVERY 4 HOURS PRN
Qty: 30 TABLET | Refills: 0 | Status: SHIPPED | OUTPATIENT
Start: 2020-03-04 | End: 2020-03-11

## 2020-03-04 RX ORDER — CEPHALEXIN 500 MG/1
500 CAPSULE ORAL 2 TIMES DAILY
Qty: 28 CAPSULE | Refills: 0 | Status: SHIPPED | OUTPATIENT
Start: 2020-03-04 | End: 2020-07-22

## 2020-03-04 RX ADMIN — Medication 10 ML: at 10:31

## 2020-03-04 RX ADMIN — CEFAZOLIN 2 G: 10 INJECTION, POWDER, FOR SOLUTION INTRAVENOUS; PARENTERAL at 04:48

## 2020-03-04 RX ADMIN — DOCUSATE SODIUM 100 MG: 100 CAPSULE, LIQUID FILLED ORAL at 08:10

## 2020-03-04 RX ADMIN — ONDANSETRON 4 MG: 2 INJECTION INTRAMUSCULAR; INTRAVENOUS at 09:21

## 2020-03-04 RX ADMIN — OXYCODONE HYDROCHLORIDE AND ACETAMINOPHEN 2 TABLET: 5; 325 TABLET ORAL at 08:09

## 2020-03-04 RX ADMIN — DIAZEPAM 5 MG: 5 TABLET ORAL at 11:10

## 2020-03-04 RX ADMIN — OXYCODONE HYDROCHLORIDE AND ACETAMINOPHEN 1 TABLET: 5; 325 TABLET ORAL at 12:38

## 2020-03-04 RX ADMIN — OXYCODONE HYDROCHLORIDE AND ACETAMINOPHEN 2 TABLET: 5; 325 TABLET ORAL at 03:38

## 2020-03-04 RX ADMIN — DIAZEPAM 5 MG: 5 TABLET ORAL at 04:48

## 2020-03-04 ASSESSMENT — PAIN DESCRIPTION - FREQUENCY
FREQUENCY: CONTINUOUS

## 2020-03-04 ASSESSMENT — PAIN DESCRIPTION - ORIENTATION
ORIENTATION: RIGHT;LEFT

## 2020-03-04 ASSESSMENT — PAIN DESCRIPTION - DESCRIPTORS
DESCRIPTORS: SORE

## 2020-03-04 ASSESSMENT — PAIN DESCRIPTION - ONSET
ONSET: ON-GOING

## 2020-03-04 ASSESSMENT — PAIN SCALES - GENERAL
PAINLEVEL_OUTOF10: 7
PAINLEVEL_OUTOF10: 7
PAINLEVEL_OUTOF10: 5
PAINLEVEL_OUTOF10: 6
PAINLEVEL_OUTOF10: 5

## 2020-03-04 ASSESSMENT — PAIN DESCRIPTION - PAIN TYPE
TYPE: SURGICAL PAIN

## 2020-03-04 ASSESSMENT — PAIN DESCRIPTION - LOCATION
LOCATION: BREAST

## 2020-03-04 NOTE — PROGRESS NOTES
Bedside report given to Augstina MARTINEZ
CLINICAL PHARMACY NOTE: MEDS TO [address] [place] [address] Drive Select Patient?: No  Total # of Prescriptions Filled: 3   The following medications were delivered to the patient:  · Keflex  · Oxycodone  · Diazepam  Total # of Interventions Completed: 1  Time Spent (min): 15    Additional Documentation:
Discharge Progress Note  3/4/2020 12:50 PM    Data:  Discharge order received. Action:  IV D/C'd without difficulty. See Doc Flowsheets for assessment. Patient given discharge instructions with prescriptions. ALEJANDRO drain teaching given to  and patient. Response:  Patient verbalized understanding of discharge instructions. Patient left with all belongings.     Cristiana [facility]  ________________________________________________________________________
PACU Transfer Note    Current Allergies: Adhesive tape and No known allergies    Pt meets criteria as per Cliff Score and ASPAN Standards to transfer to next phase of care. No results for input(s): POCGLU in the last 72 hours. Vitals:    03/02/20 1815   BP: 123/82   Pulse: 110   Resp: 18   Temp: 98 °F (36.7 °C)   SpO2: 99%       SpO2: 99 %    O2 Flow Rate (L/min): 2 L/min      Intake/Output Summary (Last 24 hours) at 3/2/2020 1834  Last data filed at 3/2/2020 1827  Gross per 24 hour   Intake 2264 ml   Output 715 ml   Net 1549 ml       Pain assessment:  present - adequately treated    Pain Level: 4    No skin issues noted. Handoff report given at bedside.    Family updated and directed to pt room      3/2/2020 6:34 PM
Patient ambulated in halls w/ family. Pain controlled w/ q6 percocet. Tolerating meals. C/o swelling (more so on L side). Pt encouraged to get into chair and ambulate (she has done both). ALEJANDRO drains draining adequately.  Will cont to monitor
Patient clamed down after IV Ativan given. Dozing quietly at this time.
Pt A&O, VSS. Has been resting well throughout the night. States feeling overall better. Surgical pain tolerable with PRN Percocet and scheduled Valium. Denies continuous nausea. Surgical sites remain covered-dressings CDI, Bra in place. Pain ball intact. Bilateral ALEJANDRO's intact-serosanguineous drainage. Requires more ALEJANDRO teaching w/ . Up independently. Voiding w/o issues. Tolerating general diet well. No other requests, will monitor.
Pt admit from PACU. Pt is sleeping. Alert to voice and oriented. Pt c/o of pain. Pt does have pain ball and prn meds. Vsstable. . pt will be on general diet. ALON surgical sites d/t surgical bra in place. Pt has bilat jps. SBA to the bathroom. Pt lung sounds diminished. Hypoactive bowel sounds. No new skin issues noted. Pt resting in bed, will continue to monitor.
Pt unable to recall how many times she has urinated bc \"i've gone every other hour or so. \" Only 100ml has been measured. States she has gone frequently, but still has had no BM.     Electronically signed by Scott Kwong RN on 3/3/2020 at 6:25 PM
Pt's  at bedside for visit.
Report given to Vance Swain RN at bedside, all questions answered at this time.
[facility] [employer] / [facility] [address] [facility], [address]    Acknowledgment of Informed Consent for Surgical or Medical Procedure and Sedation  I agree to allow doctor(s) Eliecer Cam and Rae Sanz III his/her associates or assistants, including residents and/or other qualified medical practitioner to perform the following medical treatment or procedure and to administer or direct the administration of sedation as necessary:  Procedure(s): BILATERAL SIMPLE MASTECTOMY, RIGHT SENTINEL NODE BIOPSY, BILATERAL FIRST STAGE BREAST RECONSTRUCTION   My doctor has explained the following regarding the proposed procedure:   the explanation of the procedure   the benefits of the procedure   the potential problems that might occur during recuperation   the risks and side effects of the procedure which could include but are not limited to severe blood loss, infection, stroke or death   the benefits, risks and side effect of alternative procedures including the consequences of declining this procedure or any alternative procedures   the likelihood of achieving satisfactory results. I acknowledge no guarantee or assurance has been made to me regarding the results. I understand that during the course of this treatment/procedure, unforeseen conditions can occur which require an additional or different procedure. I agree to allow my physician or assistants to perform such extension of the original procedure as they may find necessary. I understand that sedation will often result in temporary impairment of memory and fine motor skills and that sedation can occasionally progress to a state of deep sedation or general anesthesia. I understand the risks of anesthesia for surgery include, but are not limited to, sore throat, hoarseness, injury to face, mouth, or teeth; nausea; headache; injury to blood vessels or nerves; death, brain damage, or paralysis.     I understand that if I have a
home.  Instructed on dressing and drain care. Script given.     Luana Cedeño MD  [phone]
procedure. 13. Bring cases for your glasses, contacts, dentures, or hearing aids to protect them while you are in surgery. 16. If you use a CPAP, please bring it with you on the day of your procedure. 17. Do not shave or wax for 72 hours prior to procedure near your operative site  18. FOR WOMAN OF CHILDBEARING AGE ONLY- please bring a urine sample with you on day of surgery or make sure we can collect on arrival.    If you have further questions, you may contact your surgeon's office or us at [phone]    Left instructions on patient's voicemail. Sada Mitchell. 2/26/2020 .10:47 AM
vitals   Respiratory: Extubated, breathing room air comfortably   FEN:  Fluids: LR at 100, Diet: General as tolerated  : No calle, bladder scan at midnight if patient has yet to void, leave note and page surgical resident with result  Wound: Local care, continue surgical bra at all times, surgical dressing to remain in place  Ambulation: Out of bed to chair during the day, encourage frequent ambulation  VTE PPx: SCDs    Priscilla Jordan MD, MPH  PGY-1 General Surgery  03/02/20  7:16 PM  [phone]    ALEJANDRO output 40 right   ALEJANDRO outptu 25 left  Bilateral mastectomy sites: no collections,  No eccymoses, no swelling, flaps viable    A/P post op check bilateral mastectomy with reconstruction: very sore, very sleepy. Still early in post op phase. meds as needed. Diet when ready.     Lui Maravilla MD  [phone]
with moderate assistance. C/o stomach feels swollen and hard- denies nausea or abdominal pain. ALEJANDRO output 145 left serosanginous  ALEJANDRO output 170 right  serosanginous    Bilateral mastectomy sites: no collections, light ischemic changes vs ecchymoses medial right superior flap  Abd: mild swelling, nontender, nondistended, no masses    A/P POD#1 bilateral mastectomy with stage 1 reconstruction: slow to recover. Has only been out of OR for <24 hours. Still requiring intermittent iv pain medication. encourage ambulation. I think she has swelling due to third spacing - now off iv fluids. Not ready for discharge today. Expect discharge tomorrow.     Lui Maravilla MD  [phone]

## 2020-03-04 NOTE — CARE COORDINATION
Case Management Assessment            Discharge Note                    Date / Time of Note: 3/4/2020 9:17 AM                  Discharge Note Completed by: Connor Walker    Patient Name: Ketty Fulton   YOB: 1990  Diagnosis: Malignant neoplasm of right female breast, unspecified estrogen receptor status, unspecified site of breast ([facility] [address] [C50.911]  Breast cancer of lower-outer quadrant of right female breast ([facility] [address] [C50.511]  Malignant neoplasm of lower-outer quadrant of right female breast [facility]) García Cervantes   Date / Time: 3/2/2020  8:48 AM    Current PCP: BILLY Menjivar CNP  Clinic patient: No    Hospitalization in the last 30 days: No    Advance Directives:  Code Status: Full Code  [place] DNR form completed and on chart: No    Financial:  Payor: Cassi [address] 150 / Plan: BCBS - [place] PPO / Product Type: *No Product type* /      Pharmacy:    Tanya Cates [address] [place] [address] [phone] Ardia Nageotte [phone]  Anuel Hernandez \[facility]\"" [address] [address]  Phone: [phone] Fax: [phone]      Assistance purchasing medications?: Potential Assistance Purchasing Medications: No  Assistance provided by Case Management: None at this time    Does patient want to participate in local refill/ meds to beds program?: No    Meds To Beds General Rules:  1. Can ONLY be done Monday- Friday between 8:30am-5pm  2. Prescription(s) must be in pharmacy by 3pm to be filled same day  3. Copy of patient's insurance/ prescription drug card and patient face sheet must be sent along with the prescription(s)  4. Cost of Rx cannot be added to hospital bill. If financial assistance is needed, please contact unit  or ;  or  CANNOT provide pharmacy voucher for patients co-pays  5.  Patients can then  the prescription on their way out of the hospital at discharge, or pharmacy can deliver to the bedside if staff is available. (payment due at

## 2020-03-04 NOTE — PLAN OF CARE
Problem: Pain:  Goal: Control of acute pain  Description  Control of acute pain  Note:   Pt's acute pain is surgical. R and L side of breasts. Has been tolerable with PRN Percocet. Reinforcing pt education r/t appropriate pain management and expectations. Will continue to monitor for changes in status and for the need to adjust interventions. Problem: Infection - Surgical Site:  Goal: Will show no infection signs and symptoms  Description  Will show no infection signs and symptoms  Note:   Surgical sites remain covered w/ dry dressing-CDI. Pain ball intact. Surrounding skin to surgical sites are WDL. Continuing to monitor for acute changes in perfusion and healing process. Reinforcing pt education r/t appropriate wound care.

## 2020-03-05 ENCOUNTER — TELEPHONE (OUTPATIENT)
Dept: FAMILY MEDICINE CLINIC | Age: 30
End: 2020-03-05

## 2020-07-15 NOTE — PROGRESS NOTES
[facility] ([facility]) [address] [facility] [facility], [address]    Acknowledgment of Informed Consent for Surgical or Medical Procedure and Sedation  I agree to allow doctor(s) Mayela zheng and his/her associates or assistants, including residents and/or other qualified medical practitioner to perform the following medical treatment or procedure and to administer or direct the administration of sedation as necessary:  Procedure(s): INSERTION OF TISSUE EXPANDER LEFT SIDE CHEST  My doctor has explained the following regarding the proposed procedure:   the explanation of the procedure   the benefits of the procedure   the potential problems that might occur during recuperation   the risks and side effects of the procedure which could include but are not limited to severe blood loss, infection, stroke or death   the benefits, risks and side effect of alternative procedures including the consequences of declining this procedure or any alternative procedures   the likelihood of achieving satisfactory results. I acknowledge no guarantee or assurance has been made to me regarding the results. I understand that during the course of this treatment/procedure, unforeseen conditions can occur which require an additional or different procedure. I agree to allow my physician or assistants to perform such extension of the original procedure as they may find necessary. I understand that sedation will often result in temporary impairment of memory and fine motor skills and that sedation can occasionally progress to a state of deep sedation or general anesthesia. I understand the risks of anesthesia for surgery include, but are not limited to, sore throat, hoarseness, injury to face, mouth, or teeth; nausea; headache; injury to blood vessels or nerves; death, brain damage, or paralysis.     I understand that if I have a Limitation of Treatment order in effect during my hospitalization, the order may or may not be in effect during this procedure. I give my doctor permission to give me blood or blood products. I understand that there are risks with receiving blood such as hepatitis, AIDS, fever, or allergic reaction. I acknowledge that the risks, benefits, and alternatives of this treatment have been explained to me and that no express or implied warranty has been given by the hospital, any blood bank, or any person or entity as to the blood or blood components transfused. At the discretion of my doctor, I agree to allow observers, equipment/product representatives and allow photographing, and/or televising of the procedure, provided my name or identity is maintained confidentially. I agree the hospital may dispose of or use for scientific or educational purposes any tissue, fluid, or body parts which may be removed.     ________________________________Date________Time______ am/pm  ([facility])  Patient or Signature of Closest Relative or Legal Guardian    ________________________________Date________Time______am/pm      Page 1 of  1  Witness

## 2020-07-17 ENCOUNTER — OFFICE VISIT (OUTPATIENT)
Dept: PRIMARY CARE CLINIC | Age: 30
End: 2020-07-17
Payer: COMMERCIAL

## 2020-07-17 PROCEDURE — 99211 OFF/OP EST MAY X REQ PHY/QHP: CPT | Performed by: NURSE PRACTITIONER

## 2020-07-17 NOTE — PROGRESS NOTES
Patient presented to [facility] drive up clinic for preop testing. Patient was swabbed and given information advising them to remain isolated until procedure date.

## 2020-07-19 LAB
SARS-COV-2: NOT DETECTED
SOURCE: NORMAL

## 2020-07-21 NOTE — PROGRESS NOTES
registered at your bedside once brought back to your room. 5. DO NOT EAT ANYTHING eight hours prior to surgery. May have 8 ounces of water 4 hours prior to surgery. 6. MEDICATIONS    Take the following medications with a SMALL sip of water: Use your usual dose of inhalers the morning of surgery. BRING your rescue inhaler with you to hospital.    Anesthesia does NOT want you to take insulin the morning of surgery. They will control your blood sugar while you are at the hospital. Please contact your ordering physician for instructions regarding your insulin the night before your procedure. If you have an insulin pump, please keep it set on basal rate. 7. Do not swallow water when brushing teeth. No gum, candy, mints or ice chips. Refrain from smoking or at least decrease the amount. 8. Dress in loose, comfortable clothing appropriate for redressing after your procedure. Do not wear jewelry (including body piercings), make-up (especially NO eye make-up), fingernail polish (NO toenail polish if foot/leg surgery), lotion, powders or metal hairclips. 9. Dentures, glasses, or contacts will need to be removed before your procedure. Bring cases for your glasses, contacts, dentures, or hearing aids to protect them while you are in surgery. 10. If you use a CPAP, please bring it with you on the day of your procedure. 11. We recommend that valuable personal  belongings such as cash, cell phones, e-tablets or jewelry, be left at home during your stay. The hospital will not be responsible for valuables that are not secured in the hospital safe. However, if your insurance requires a co-pay, you may want to bring a method of payment, i.e. Check or credit card, if you wish to pay your co-pay the day of surgery. 12. If you are to stay overnight, you may bring a bag with personal items. Please have any large items you may need brought in by your family after your arrival to your hospital room.     13. If you have a Living Will or Durable Power of , please bring a copy on the day of your procedure. 15. With your permission, one family member may accompany you while you are being prepared for surgery. Once you are ready, additional family members may join you. HOW WE KEEP YOU SAFE and WORK TO PREVENT SURGICAL SITE INFECTIONS:  1. Health care workers should always check your ID bracelet to verify your name and birth date. You will be asked many times to state your name, date of birth, and allergies. 2. Health care workers should always clean their hands with soap or alcohol gel before providing care to you. It is okay to ask anyone if they cleaned their hands before they touch you. 3. You will be actively involved in verifying the type of procedure you are having and ensuring the correct surgical site. This will be confirmed multiple times prior to your procedure. Do NOT alma your surgery site UNLESS instructed to by your surgeon. 4. Do not shave or wax for 72 hours prior to procedure near your operative site. Shaving with a razor can irritate your skin and make it easier to develop an infection. On the day of your procedure, any hair that needs to be removed near the surgical site will be clipped by a healthcare worker using a special clippers designed to avoid skin irritation. 5. When you are in the operating room, your surgical site will be cleansed with a special soap, and in most cases, you will be given an antibiotic before the surgery begins. What to expect AFTER YOUR PROCEDURE:  1. Immediately following your procedure, your will be taken to the PACU for the first phase of your recovery. Your nurse will help you recover from any potential side effects of anesthesia, such as extreme drowsiness, changes in your vital signs or breathing patterns. Nausea, headache, muscle aches, or sore throat may also occur after anesthesia.   Your nurse will help you manage these potential side effects. 2. For comfort and safety, arrange to have someone at home with you for the first 24 hours after discharge. 3. You and your family will be given written instructions about your diet, activity, dressing care, medications, and return visits. 4. Once at home, should issues with nausea, pain, or bleeding occur, or should you notice any signs of infection, you should call your surgeon. 5. Always clean your hands before and after caring for your wound. Do not let your family touch your surgery site without cleaning their hands. 6. Narcotic pain medications can cause significant constipation. You may want to add a stool softener to your postoperative medication schedule or speak to your surgeon on how best to manage this SIDE EFFECT. SPECIAL INSTRUCTIONS   Thank you for allowing us to care for you. We strive to exceed your expectations in the delivery of care and service provided to you and your family. If you need to contact us for any reason, please call us at [phone]    Instructions reviewed with patient during preadmission testing phone interview. Kita Elizabeth. 7/21/2020 .3:28 PM      ADDITIONAL EDUCATIONAL INFORMATION REVIEWED PER PHONE WITH YOU AND/OR YOUR FAMILY:  Yes Antibacterial Soap

## 2020-07-22 ENCOUNTER — ANESTHESIA EVENT (OUTPATIENT)
Dept: OPERATING ROOM | Age: 30
End: 2020-07-22
Payer: COMMERCIAL

## 2020-07-22 RX ORDER — TAMOXIFEN CITRATE 20 MG/1
20 TABLET ORAL EVERY EVENING
COMMUNITY
Start: 2020-06-17 | End: 2022-01-17 | Stop reason: ALTCHOICE

## 2020-07-22 NOTE — PROGRESS NOTES
[facility] PRE-SURGICAL TESTING INSTRUCTIONS                              PRIOR TO PROCEDURE DATE:  1. Please follow any guidelines/instructions prior to your procedure as advised by your surgeon. 2. Arrange for someone to drive you home and be with you for the first 24 hours after discharge for your safety after your procedure for which you received sedation. Ensure it is someone we can share information with regarding your discharge. 3. You must contact your surgeon for instructions IF:   You are taking any blood thinners, aspirin, anti-inflammatory or vitamin E.   There is a change in your physical condition such as a cold, fever, rash, cuts, sores or any other infection, especially near your surgical site. 4. Do not drink alcohol the day before or day of your procedure. 5. A Pre-op History and Physical for surgery MUST be completed by your Physician or Urgent Care within 30 days of your procedure date. Please bring a copy with you on the day of your procedure and along with any other testing performed. THE DAY OF YOUR PROCEDURE:  1. Follow instructions for ARRIVAL TIME as DIRECTED BY YOUR SURGEON. I    2. Enter the MAIN entrance from [address] and follow the signs to the free [employer] or [employer] parking (offered free of charge 6am-5pm). 3. Enter the Main Entrance of the hospital (do not enter from the lower level of the parking garage). Upon entrance, check in with the  at the main desk on your left. If no one is available at the desk, proceed into the [facility] Waiting Room and go through the door directly into the [facility]. There is a Check-in desk ACROSS from Room 5 (marked with a sign hanging from the ceiling). The phone number for the surgery center is [phone]. 4. Please call [phone] option #2 option #2 if you have not been preregistered yet. On the day of your procedure bring your insurance card and photo ID.  You will be registered at your bedside once brought back to your room. 5. DO NOT EAT ANYTHING eight hours prior to surgery. May have 8 ounces of water 4 hours prior to surgery. 6. MEDICATIONS    Take the following medications with a SMALL sip of water: synthroid   Use your usual dose of inhalers the morning of surgery. BRING your rescue inhaler with you to hospital.    Anesthesia does NOT want you to take insulin the morning of surgery. They will control your blood sugar while you are at the hospital. Please contact your ordering physician for instructions regarding your insulin the night before your procedure. If you have an insulin pump, please keep it set on basal rate. 7. Do not swallow water when brushing teeth. No gum, candy, mints or ice chips. Refrain from smoking or at least decrease the amount. 8. Dress in loose, comfortable clothing appropriate for redressing after your procedure. Do not wear jewelry (including body piercings), make-up (especially NO eye make-up), fingernail polish (NO toenail polish if foot/leg surgery), lotion, powders or metal hairclips. 9. Dentures, glasses, or contacts will need to be removed before your procedure. Bring cases for your glasses, contacts, dentures, or hearing aids to protect them while you are in surgery. 10. If you use a CPAP, please bring it with you on the day of your procedure. 11. We recommend that valuable personal  belongings such as cash, cell phones, e-tablets or jewelry, be left at home during your stay. The hospital will not be responsible for valuables that are not secured in the hospital safe. However, if your insurance requires a co-pay, you may want to bring a method of payment, i.e. Check or credit card, if you wish to pay your co-pay the day of surgery. 12. If you are to stay overnight, you may bring a bag with personal items.  Please have any large items you may need brought in by your family after your arrival to your hospital potential side effects. 2. For comfort and safety, arrange to have someone at home with you for the first 24 hours after discharge. 3. You and your family will be given written instructions about your diet, activity, dressing care, medications, and return visits. 4. Once at home, should issues with nausea, pain, or bleeding occur, or should you notice any signs of infection, you should call your surgeon. 5. Always clean your hands before and after caring for your wound. Do not let your family touch your surgery site without cleaning their hands. 6. Narcotic pain medications can cause significant constipation. You may want to add a stool softener to your postoperative medication schedule or speak to your surgeon on how best to manage this SIDE EFFECT. SPECIAL INSTRUCTIONS     Thank you for allowing us to care for you. We strive to exceed your expectations in the delivery of care and service provided to you and your family. If you need to contact us for any reason, please call us at [phone]    Instructions reviewed with patient during preadmission testing phone interview. Sada Mitchell. 7/22/2020 .10:03 AM      ADDITIONAL EDUCATIONAL INFORMATION REVIEWED PER PHONE WITH YOU AND/OR YOUR FAMILY:  Yes Bring a urine sample on day of surgery  Yes Pain Goal-Taking Control of Your Pain  Yes FAQs about Surgical Site Infections  No Hibiclens® Bathing Instructions   Yes Antibacterial Soap  No Robert® Wipes Bathing Instructions (Obtained from: https://www.[employer]/. pdf )  No Incentive Spirometer Education  No Other

## 2020-07-23 ENCOUNTER — ANESTHESIA (OUTPATIENT)
Dept: OPERATING ROOM | Age: 30
End: 2020-07-23
Payer: COMMERCIAL

## 2020-07-23 ENCOUNTER — HOSPITAL ENCOUNTER (OUTPATIENT)
Age: 30
Setting detail: OUTPATIENT SURGERY
Discharge: HOME | End: 2020-07-23
Attending: PLASTIC SURGERY | Admitting: PLASTIC SURGERY
Payer: COMMERCIAL

## 2020-07-23 VITALS
SYSTOLIC BLOOD PRESSURE: 121 MMHG | TEMPERATURE: 96.8 F | OXYGEN SATURATION: 99 % | RESPIRATION RATE: 7 BRPM | DIASTOLIC BLOOD PRESSURE: 70 MMHG

## 2020-07-23 VITALS
RESPIRATION RATE: 14 BRPM | WEIGHT: 176 LBS | HEIGHT: 64 IN | OXYGEN SATURATION: 97 % | TEMPERATURE: 96.7 F | HEART RATE: 84 BPM | SYSTOLIC BLOOD PRESSURE: 116 MMHG | BODY MASS INDEX: 30.05 KG/M2 | DIASTOLIC BLOOD PRESSURE: 77 MMHG

## 2020-07-23 PROBLEM — Z90.13 STATUS POST BILATERAL MASTECTOMY: Status: ACTIVE | Noted: 2020-07-23

## 2020-07-23 LAB — PREGNANCY, URINE: NEGATIVE

## 2020-07-23 PROCEDURE — 6360000002 HC RX W HCPCS: Performed by: ANESTHESIOLOGY

## 2020-07-23 PROCEDURE — 6360000002 HC RX W HCPCS: Performed by: NURSE ANESTHETIST, CERTIFIED REGISTERED

## 2020-07-23 PROCEDURE — 7100000011 HC PHASE II RECOVERY - ADDTL 15 MIN: Performed by: PLASTIC SURGERY

## 2020-07-23 PROCEDURE — 6360000002 HC RX W HCPCS: Performed by: PLASTIC SURGERY

## 2020-07-23 PROCEDURE — 2709999900 HC NON-CHARGEABLE SUPPLY: Performed by: PLASTIC SURGERY

## 2020-07-23 PROCEDURE — 84703 CHORIONIC GONADOTROPIN ASSAY: CPT

## 2020-07-23 PROCEDURE — 7100000000 HC PACU RECOVERY - FIRST 15 MIN: Performed by: PLASTIC SURGERY

## 2020-07-23 PROCEDURE — 2580000003 HC RX 258: Performed by: ANESTHESIOLOGY

## 2020-07-23 PROCEDURE — 2580000003 HC RX 258: Performed by: PLASTIC SURGERY

## 2020-07-23 PROCEDURE — 7100000010 HC PHASE II RECOVERY - FIRST 15 MIN: Performed by: PLASTIC SURGERY

## 2020-07-23 PROCEDURE — 2780000010 HC IMPLANT OTHER: Performed by: PLASTIC SURGERY

## 2020-07-23 PROCEDURE — 2500000003 HC RX 250 WO HCPCS: Performed by: PLASTIC SURGERY

## 2020-07-23 PROCEDURE — 3700000001 HC ADD 15 MINUTES (ANESTHESIA): Performed by: PLASTIC SURGERY

## 2020-07-23 PROCEDURE — 3700000000 HC ANESTHESIA ATTENDED CARE: Performed by: PLASTIC SURGERY

## 2020-07-23 PROCEDURE — 7100000001 HC PACU RECOVERY - ADDTL 15 MIN: Performed by: PLASTIC SURGERY

## 2020-07-23 PROCEDURE — 3600000004 HC SURGERY LEVEL 4 BASE: Performed by: PLASTIC SURGERY

## 2020-07-23 PROCEDURE — 3600000014 HC SURGERY LEVEL 4 ADDTL 15MIN: Performed by: PLASTIC SURGERY

## 2020-07-23 DEVICE — SMOOTH, HIGH PROFILE, SUTURE TABS, INTEGRAL INJECTION DOME, 500CC
Type: IMPLANTABLE DEVICE | Site: BREAST | Status: FUNCTIONAL
Brand: ARTOURA BREAST TISSUE EXPANDER

## 2020-07-23 RX ORDER — CEFAZOLIN SODIUM 2 G/50ML
2 SOLUTION INTRAVENOUS ONCE
Status: COMPLETED | OUTPATIENT
Start: 2020-07-23 | End: 2020-07-23

## 2020-07-23 RX ORDER — ONDANSETRON 2 MG/ML
4 INJECTION INTRAMUSCULAR; INTRAVENOUS
Status: DISCONTINUED | OUTPATIENT
Start: 2020-07-23 | End: 2020-07-23 | Stop reason: HOSPADM

## 2020-07-23 RX ORDER — FENTANYL CITRATE 50 UG/ML
25 INJECTION, SOLUTION INTRAMUSCULAR; INTRAVENOUS EVERY 5 MIN PRN
Status: DISCONTINUED | OUTPATIENT
Start: 2020-07-23 | End: 2020-07-23 | Stop reason: HOSPADM

## 2020-07-23 RX ORDER — SODIUM CHLORIDE 0.9 % (FLUSH) 0.9 %
10 SYRINGE (ML) INJECTION EVERY 12 HOURS SCHEDULED
Status: DISCONTINUED | OUTPATIENT
Start: 2020-07-23 | End: 2020-07-23 | Stop reason: HOSPADM

## 2020-07-23 RX ORDER — SODIUM CHLORIDE, SODIUM LACTATE, POTASSIUM CHLORIDE, CALCIUM CHLORIDE 600; 310; 30; 20 MG/100ML; MG/100ML; MG/100ML; MG/100ML
INJECTION, SOLUTION INTRAVENOUS CONTINUOUS
Status: DISCONTINUED | OUTPATIENT
Start: 2020-07-23 | End: 2020-07-23 | Stop reason: HOSPADM

## 2020-07-23 RX ORDER — LIDOCAINE HYDROCHLORIDE 10 MG/ML
1 INJECTION, SOLUTION EPIDURAL; INFILTRATION; INTRACAUDAL; PERINEURAL
Status: DISCONTINUED | OUTPATIENT
Start: 2020-07-23 | End: 2020-07-23 | Stop reason: HOSPADM

## 2020-07-23 RX ORDER — HYDRALAZINE HYDROCHLORIDE 20 MG/ML
5 INJECTION INTRAMUSCULAR; INTRAVENOUS EVERY 5 MIN PRN
Status: DISCONTINUED | OUTPATIENT
Start: 2020-07-23 | End: 2020-07-23 | Stop reason: HOSPADM

## 2020-07-23 RX ORDER — LIDOCAINE HYDROCHLORIDE 20 MG/ML
INJECTION, SOLUTION INTRAVENOUS PRN
Status: DISCONTINUED | OUTPATIENT
Start: 2020-07-23 | End: 2020-07-23 | Stop reason: SDUPTHER

## 2020-07-23 RX ORDER — SODIUM CHLORIDE 0.9 % (FLUSH) 0.9 %
10 SYRINGE (ML) INJECTION PRN
Status: DISCONTINUED | OUTPATIENT
Start: 2020-07-23 | End: 2020-07-23 | Stop reason: HOSPADM

## 2020-07-23 RX ORDER — FENTANYL CITRATE 50 UG/ML
50 INJECTION, SOLUTION INTRAMUSCULAR; INTRAVENOUS EVERY 5 MIN PRN
Status: DISCONTINUED | OUTPATIENT
Start: 2020-07-23 | End: 2020-07-23 | Stop reason: HOSPADM

## 2020-07-23 RX ORDER — HYDROMORPHONE HCL 110MG/55ML
PATIENT CONTROLLED ANALGESIA SYRINGE INTRAVENOUS PRN
Status: DISCONTINUED | OUTPATIENT
Start: 2020-07-23 | End: 2020-07-23 | Stop reason: SDUPTHER

## 2020-07-23 RX ORDER — MIDAZOLAM HYDROCHLORIDE 1 MG/ML
INJECTION INTRAMUSCULAR; INTRAVENOUS PRN
Status: DISCONTINUED | OUTPATIENT
Start: 2020-07-23 | End: 2020-07-23 | Stop reason: SDUPTHER

## 2020-07-23 RX ORDER — ONDANSETRON 2 MG/ML
INJECTION INTRAMUSCULAR; INTRAVENOUS PRN
Status: DISCONTINUED | OUTPATIENT
Start: 2020-07-23 | End: 2020-07-23 | Stop reason: SDUPTHER

## 2020-07-23 RX ORDER — ENALAPRILAT 2.5 MG/2ML
1.25 INJECTION INTRAVENOUS
Status: DISCONTINUED | OUTPATIENT
Start: 2020-07-23 | End: 2020-07-23 | Stop reason: HOSPADM

## 2020-07-23 RX ORDER — LABETALOL 20 MG/4 ML (5 MG/ML) INTRAVENOUS SYRINGE
5 EVERY 10 MIN PRN
Status: DISCONTINUED | OUTPATIENT
Start: 2020-07-23 | End: 2020-07-23 | Stop reason: HOSPADM

## 2020-07-23 RX ORDER — PROPOFOL 10 MG/ML
INJECTION, EMULSION INTRAVENOUS PRN
Status: DISCONTINUED | OUTPATIENT
Start: 2020-07-23 | End: 2020-07-23 | Stop reason: SDUPTHER

## 2020-07-23 RX ORDER — FENTANYL CITRATE 50 UG/ML
INJECTION, SOLUTION INTRAMUSCULAR; INTRAVENOUS PRN
Status: DISCONTINUED | OUTPATIENT
Start: 2020-07-23 | End: 2020-07-23 | Stop reason: SDUPTHER

## 2020-07-23 RX ADMIN — LIDOCAINE HYDROCHLORIDE 100 MG: 20 INJECTION, SOLUTION INTRAVENOUS at 07:36

## 2020-07-23 RX ADMIN — FENTANYL CITRATE 50 MCG: 50 INJECTION INTRAMUSCULAR; INTRAVENOUS at 07:40

## 2020-07-23 RX ADMIN — SODIUM CHLORIDE, SODIUM LACTATE, POTASSIUM CHLORIDE, AND CALCIUM CHLORIDE: 600; 310; 30; 20 INJECTION, SOLUTION INTRAVENOUS at 07:31

## 2020-07-23 RX ADMIN — HYDROMORPHONE HYDROCHLORIDE 0.5 MG: 2 INJECTION, SOLUTION INTRAMUSCULAR; INTRAVENOUS; SUBCUTANEOUS at 08:32

## 2020-07-23 RX ADMIN — ONDANSETRON 4 MG: 2 INJECTION INTRAMUSCULAR; INTRAVENOUS at 08:32

## 2020-07-23 RX ADMIN — SODIUM CHLORIDE, SODIUM LACTATE, POTASSIUM CHLORIDE, AND CALCIUM CHLORIDE: 600; 310; 30; 20 INJECTION, SOLUTION INTRAVENOUS at 06:50

## 2020-07-23 RX ADMIN — MIDAZOLAM HYDROCHLORIDE 2 MG: 2 INJECTION, SOLUTION INTRAMUSCULAR; INTRAVENOUS at 07:27

## 2020-07-23 RX ADMIN — HYDROMORPHONE HYDROCHLORIDE 0.5 MG: 1 INJECTION, SOLUTION INTRAMUSCULAR; INTRAVENOUS; SUBCUTANEOUS at 11:27

## 2020-07-23 RX ADMIN — FENTANYL CITRATE 50 MCG: 50 INJECTION INTRAMUSCULAR; INTRAVENOUS at 07:52

## 2020-07-23 RX ADMIN — HYDROMORPHONE HYDROCHLORIDE 0.5 MG: 2 INJECTION, SOLUTION INTRAMUSCULAR; INTRAVENOUS; SUBCUTANEOUS at 08:25

## 2020-07-23 RX ADMIN — HYDROMORPHONE HYDROCHLORIDE 0.5 MG: 2 INJECTION, SOLUTION INTRAMUSCULAR; INTRAVENOUS; SUBCUTANEOUS at 08:03

## 2020-07-23 RX ADMIN — HYDROMORPHONE HYDROCHLORIDE 0.5 MG: 1 INJECTION, SOLUTION INTRAMUSCULAR; INTRAVENOUS; SUBCUTANEOUS at 09:35

## 2020-07-23 RX ADMIN — CEFAZOLIN SODIUM 2 G: 2 SOLUTION INTRAVENOUS at 07:29

## 2020-07-23 RX ADMIN — PROPOFOL 160 MG: 10 INJECTION, EMULSION INTRAVENOUS at 07:37

## 2020-07-23 RX ADMIN — HYDROMORPHONE HYDROCHLORIDE 0.5 MG: 2 INJECTION, SOLUTION INTRAMUSCULAR; INTRAVENOUS; SUBCUTANEOUS at 08:11

## 2020-07-23 ASSESSMENT — PULMONARY FUNCTION TESTS
PIF_VALUE: 17
PIF_VALUE: 14
PIF_VALUE: 14
PIF_VALUE: 16
PIF_VALUE: 14
PIF_VALUE: 3
PIF_VALUE: 15
PIF_VALUE: 14
PIF_VALUE: 2
PIF_VALUE: 14
PIF_VALUE: 14
PIF_VALUE: 15
PIF_VALUE: 14
PIF_VALUE: 0
PIF_VALUE: 1
PIF_VALUE: 14
PIF_VALUE: 7
PIF_VALUE: 14
PIF_VALUE: 15
PIF_VALUE: 14
PIF_VALUE: 15
PIF_VALUE: 15
PIF_VALUE: 16
PIF_VALUE: 14
PIF_VALUE: 15
PIF_VALUE: 15
PIF_VALUE: 16
PIF_VALUE: 15
PIF_VALUE: 15
PIF_VALUE: 14
PIF_VALUE: 13
PIF_VALUE: 14
PIF_VALUE: 14
PIF_VALUE: 15
PIF_VALUE: 14
PIF_VALUE: 14
PIF_VALUE: 0
PIF_VALUE: 4
PIF_VALUE: 14
PIF_VALUE: 15
PIF_VALUE: 14
PIF_VALUE: 15
PIF_VALUE: 0
PIF_VALUE: 14
PIF_VALUE: 18
PIF_VALUE: 15
PIF_VALUE: 2
PIF_VALUE: 14
PIF_VALUE: 15
PIF_VALUE: 14

## 2020-07-23 ASSESSMENT — PAIN SCALES - GENERAL
PAINLEVEL_OUTOF10: 5
PAINLEVEL_OUTOF10: 5
PAINLEVEL_OUTOF10: 9
PAINLEVEL_OUTOF10: 8

## 2020-07-23 ASSESSMENT — PAIN DESCRIPTION - FREQUENCY: FREQUENCY: CONTINUOUS

## 2020-07-23 ASSESSMENT — PAIN DESCRIPTION - PAIN TYPE: TYPE: SURGICAL PAIN

## 2020-07-23 ASSESSMENT — PAIN DESCRIPTION - ONSET: ONSET: AWAKENED FROM SLEEP

## 2020-07-23 ASSESSMENT — PAIN DESCRIPTION - DESCRIPTORS: DESCRIPTORS: ACHING;DISCOMFORT

## 2020-07-23 ASSESSMENT — PAIN DESCRIPTION - LOCATION: LOCATION: BREAST

## 2020-07-23 ASSESSMENT — PAIN DESCRIPTION - ORIENTATION: ORIENTATION: LEFT

## 2020-07-23 ASSESSMENT — PAIN - FUNCTIONAL ASSESSMENT: PAIN_FUNCTIONAL_ASSESSMENT: 0-10

## 2020-07-23 ASSESSMENT — PAIN DESCRIPTION - PROGRESSION: CLINICAL_PROGRESSION: GRADUALLY WORSENING

## 2020-07-23 NOTE — H&P
[address] [facility] Same Day Surgery Update H & P  Department of General Surgery   Surgical Service   Pre-operative History and Physical  Last H & P within the last 30 days. DIAGNOSIS:   Malignant neoplasm of right female breast, unspecified estrogen receptor status, unspecified site of breast ([facility] [address] [C50.911]  Personal history of malignant neoplasm of breast [Z85.3]  Status post bilateral mastectomy [Z90.13]    PROCEDURE:  INSERTION OF TISSUE EXPANDER LEFT SIDE CHEST     HISTORY OF PRESENT ILLNESS:   Patient with hx of right breast cancer is S/P bilateral mastectomy. She presents today for placement of left side tissue expander. Covid 19:  Patient denies fever, chills, cough or known exposure to Covid-19.   Patient reports they have been quarantined at home since Covid-19 test.      Past Medical History:        Diagnosis Date    Breast [facility])     right breast    Prolonged emergence from general anesthesia     Thyroid disease     hypothyroid     Past Surgical History:        Procedure Laterality Date    BREAST ENHANCEMENT SURGERY Bilateral 3/2/2020    BILATERAL FIRST STAGE BREAST RECONSTRUCTION performed by Wendolyn Goldmann, MD at [address]      core bx     SECTION      CHOLECYSTECTOMY  2016    MASTECTOMY Bilateral 3/2/2020    BILATERAL SIMPLE MASTECTOMY, RIGHT SENTINEL NODE BIOPSY (BLUE DYE ONLY)/ performed by Abena Overton MD at [facility]      left tissue expander removed    PORT SURGERY N/A 10/16/2019    SINGLE LUMEN PORT-A-CATHETER INSERTION LEFT SUBCLAVIAN performed by Abena Overton MD at [address] EXTRACTION       Past Social History:  Social History     Socioeconomic History    Marital status:      Spouse name: Not on file    Number of children: Not on file    Years of education: Not on file    Highest education level: Not on file   Occupational History    Not on file Social Needs    Financial resource strain: Not on file    Food insecurity     Worry: Not on file     Inability: Not on file    Transportation needs     Medical: Not on file     Non-medical: Not on file   Tobacco Use    Smoking status: Never Smoker    Smokeless tobacco: Never Used   Substance and Sexual Activity    Alcohol use: Yes     Comment: occasionally    Drug use: No    Sexual activity: Yes     Partners: Male   Lifestyle    Physical activity     Days per week: Not on file     Minutes per session: Not on file    Stress: Not on file   Relationships    Social connections     Talks on phone: Not on file     Gets together: Not on file     Attends Gnosticist service: Not on file     Active member of club or organization: Not on file     Attends meetings of clubs or organizations: Not on file     Relationship status: Not on file    Intimate partner violence     Fear of current or ex partner: Not on file     Emotionally abused: Not on file     Physically abused: Not on file     Forced sexual activity: Not on file   Other Topics Concern    Not on file   Social History Narrative    Not on file         Medications Prior to Admission:      Prior to Admission medications    Medication Sig Start Date End Date Taking?  Authorizing Provider   tamoxifen (NOLVADEX) 20 MG tablet  6/17/20  Yes Historical Provider, MD   lidocaine-prilocaine (EMLA) 2.5-2.5 % cream Apply to port iste one hour prior to treatment, cover with saran wrap or bandaid 10/16/19  Yes Marya aWre MD   levothyroxine (SYNTHROID) 50 MCG tablet Take 25 mcg by mouth daily    Yes Historical Provider, MD   ondansetron (ZOFRAN) 4 MG tablet Take 1 tablet by mouth every 8 hours as needed for Nausea or Vomiting 11/26/19   Sadiq George MD         Allergies:  Adhesive tape and No known allergies    PHYSICAL EXAM:      /78   Pulse 63   Temp 98 °F (36.7 °C) (Oral)   Resp 18   Ht 5' 4\" (1.626 m)   Wt 176 lb (79.8 kg)   SpO2 98%   BMI 30.21 kg/m²      Airway:  Airway patent with no audible stridor    Heart:  regular rate and rhythm, No murmur noted    Lungs:  No increased work of breathing, good air exchange, clear to auscultation bilaterally, no crackles or wheezing    Abdomen:  soft, non-distended, non-tender, no rebound tenderness or guarding, normal active bowel sounds and no masses palpated    ASSESSMENT AND PLAN     Patient is a 26 y.o. female with above specified procedure planned. 1.  Patient seen and focused exam done today- no new changes since last physical exam on 7/14/20    2. Access to ancillary services are available per request of the provider.     BILLY Segura - CNP     7/23/2020

## 2020-07-23 NOTE — PROGRESS NOTES
PACU Transfer to [facility]    Vitals:    07/23/20 1045   BP: 127/80   Pulse: 95   Resp: 15   Temp: 97.3 °F (36.3 °C)   SpO2: 98%         Intake/Output Summary (Last 24 hours) at 7/23/2020 1053  Last data filed at 7/23/2020 1045  Gross per 24 hour   Intake 197 ml   Output --   Net 197 ml       Pain assessment:  None-reports present but improves & tolerable  Pain Level: 5    Patient transferred to care of REED RN.    7/23/2020 10:53 AM

## 2020-07-23 NOTE — PROGRESS NOTES
Updated  again, informed him patient is transferring to [facility] to proceed with discharge home.

## 2020-07-23 NOTE — ANESTHESIA PRE PROCEDURE
0615   BP:  118/78   Pulse:  63   Resp:  18   Temp:  98 °F (36.7 °C)   TempSrc:  Oral   SpO2:  98%   Weight: 176 lb (79.8 kg) 176 lb (79.8 kg)   Height: 5' 4\" (1.626 m) 5' 4\" (1.626 m)                                              BP Readings from Last 3 Encounters:   07/23/20 118/78   03/04/20 111/73   03/02/20 112/64       NPO Status: Time of last liquid consumption: 2300                        Time of last solid consumption: 2300                        Date of last liquid consumption: 07/22/20                        Date of last solid food consumption: 07/22/20    BMI:   Wt Readings from Last 3 Encounters:   07/23/20 176 lb (79.8 kg)   03/02/20 189 lb (85.7 kg)   11/24/19 165 lb (74.8 kg)     Body mass index is 30.21 kg/m². CBC:   Lab Results   Component Value Date    WBC 7.2 03/02/2020    RBC 4.10 03/02/2020    HGB 12.9 03/02/2020    HCT 37.7 03/02/2020    MCV 92.1 03/02/2020    RDW 14.1 03/02/2020     03/02/2020       CMP:   Lab Results   Component Value Date     03/02/2020    K 4.7 03/02/2020    K 4.2 11/24/2019     03/02/2020    CO2 22 03/02/2020    BUN 11 03/02/2020    CREATININE 0.5 03/02/2020    GFRAA >60 03/02/2020    AGRATIO 1.8 03/02/2020    LABGLOM >60 03/02/2020    GLUCOSE 93 03/02/2020    PROT 7.2 03/02/2020    CALCIUM 9.1 03/02/2020    BILITOT 0.5 03/02/2020    ALKPHOS 81 03/02/2020    AST 38 03/02/2020    ALT 34 03/02/2020       POC Tests: No results for input(s): POCGLU, POCNA, POCK, POCCL, POCBUN, POCHEMO, POCHCT in the last 72 hours.     Coags: No results found for: PROTIME, INR, APTT    HCG (If Applicable):   Lab Results   Component Value Date    PREGTESTUR Negative 07/23/2020        ABGs: No results found for: PHART, PO2ART, DHR7PNL, XOD3MZA, BEART, Z8FWNXBI     Type & Screen (If Applicable):  No results found for: LABABO, LABRH    Drug/Infectious Status (If Applicable):  No results found for: HIV, HEPCAB    COVID-19 Screening (If Applicable):   Lab Results   Component Value Date    COVID19 Not Detected 07/17/2020         Anesthesia Evaluation  Patient summary reviewed and Nursing notes reviewed  Airway: Mallampati: III  TM distance: >3 FB   Neck ROM: full  Mouth opening: > = 3 FB Dental: normal exam         Pulmonary:Negative Pulmonary ROS                              Cardiovascular:Negative CV ROS                      Neuro/Psych:   Negative Neuro/Psych ROS              GI/Hepatic/Renal: Neg GI/Hepatic/Renal ROS            Endo/Other:    (+) hypothyroidism::., .                  ROS comment: Breast Ca Abdominal:           Vascular: negative vascular ROS. Anesthesia Plan      general     ASA 3       Induction: intravenous. Anesthetic plan and risks discussed with patient.                       Alvin Varghese MD   7/23/2020

## 2020-07-23 NOTE — PROGRESS NOTES
Ambulatory Surgery/Procedure Discharge Note    Vitals:    07/23/20 1135   BP: 116/77   Pulse: 84   Resp: 14   Temp: 96.7 °F (35.9 °C)   SpO2: 97%     Patient arrived in Phase II recovery alert and oriented. Complains of 9 out of 10 pain. Administered medication as ordered and patient reported decrease in pain. Dressing clean, dry, intact. Discharge instructions reviewed with patient and spouse. In: 197 [I.V.:197]  Out: -     Restroom use offered before discharge. Yes    Pain assessment:  present - adequately treated  Pain Level: 5        Patient discharged to home/self care.  Patient discharged via wheel chair by transporter to waiting family/S.O.       7/23/2020 12:14 PM

## 2020-07-23 NOTE — PROGRESS NOTES
Called pt's  Nelida Valadez and updated him. Stated he spoke to [facility]. He stated his wife is hard to wake up after surgery. Patient remains drowsy with eyes closed even when talking to but awakens easily to name. RANDA, on RA.

## 2020-07-23 NOTE — ANESTHESIA POSTPROCEDURE EVALUATION
Department of Anesthesiology  Postprocedure Note    Patient: Elle Smith  MRN: [id number]  [place]: 1990  Date of evaluation: 7/23/2020  Time:  10:52 AM     Procedure Summary     Date:  07/23/20 Room / Location:  [facility]– [place][note 2] [address] / [address]    Anesthesia Start:  0774 Anesthesia Stop:  0903    Procedure:  INSERTION OF TISUUE EXPANDER LEFT SIDE CHEST WALL (Left Breast) Diagnosis:       Malignant neoplasm of right female breast, unspecified estrogen receptor status, unspecified site of breast ([facility] [address]      Personal history of malignant neoplasm of breast      Status post bilateral mastectomy      (PERSONAL  HISTORY OF MALIGNANT NEOPLASM OF BREAST Z85.3, STATUS POST BILATERAL MASTECTOMY Z90.13, MALIGNANT NEOPLASM OF RIGHT FEMALE BREAST, UNSPECIFIED ESTROGEN RECEPTOR STATUS, UNSPECIFIED SITE OF BREAST C50.911)    Surgeon:  Alana Brar MD Responsible Provider:  Maicol Monet MD    Anesthesia Type:  general ASA Status:  3          Anesthesia Type: general    Cliff Phase I: Cliff Score: 10    Cliff Phase II:      Last vitals: Reviewed and per EMR flowsheets.        Anesthesia Post Evaluation    Patient location during evaluation: PACU  Patient participation: complete - patient participated  Level of consciousness: awake  Airway patency: patent  Nausea & Vomiting: no nausea and no vomiting  Complications: no  Cardiovascular status: hemodynamically stable  Respiratory status: acceptable  Hydration status: stable

## 2020-07-23 NOTE — OP NOTE
[address], [address]                                OPERATIVE REPORT    PATIENT NAME: Garrett Nails               :        1990  MED REC NO:   [id number]                          ROOM:  ACCOUNT NO:   [de-identified]                           ADMIT DATE: 2020  PROVIDER:     Jermain Underwood MD    DATE OF PROCEDURE:  2020    PREOPERATIVE DIAGNOSES:  1. Personal history of breast cancer. 2.  Status post bilateral mastectomy. 3.  Status post tissue expander placement, bilateral chest wall. 4.  Status post removal of left-sided tissue expander due to exposure. POSTOPERATIVE DIAGNOSES:  1. Personal history of breast cancer. 2.  Status post bilateral mastectomy. 3.  Status post tissue expander placement, bilateral chest wall. 4.  Status post removal of left-sided tissue expander due to exposure. PROCEDURE:  Insertion of tissue expander, left side chest wall (first  stage left breast reconstruction). SURGEON:  Jeison Lovett MD    ANESTHESIA:  General.    ESTIMATED BLOOD LOSS:  45 mL. SPECIMENS:  None. LOCAL ANESTHETIC USED:  30 mL of 0.25% Marcaine, 1:200,000 epinephrine. SPECIMENS:  None. EXPANDER USED:  [place] 500 mL High Profile tissue expander; 125  mL normal saline placed into expander today. DRAINS:  None. DISPOSITION:  PACU. HISTORY:  This is a 24-year-old female who on 2020 underwent  bilateral mastectomy using bilateral first stage breast reconstruction  with tissue expander placement on both sides of the chest wall. On  , the left-sided tissue expander was removed because the patient  had had healing problems and an exposed tissue expander. She returns  today for reinsertion. DETAILS OF PROCEDURE:  The patient was marked in the preoperative  holding area in the sitting position.   She was taken back to the  operating room and placed supine on the operating room table. She was  given general anesthetic and an airway was placed. She was then prepped  and draped in a sterile fashion. The inferior scar from her mastectomy was excised and then dissection  was carried down to the submuscular plane. The pocket was created in  accordance with the previously-made markings. When the sizer had been  removed in the office on 04/03, a portion up to the tabs incorporating  the expander were left behind. These were covered and removed. Hemostasis was achieved with cautery. Antibiotic irrigation was used. The expander was brought up on to the field, evacuated of air and then  partially filled with saline. It was soaked in antibiotic irrigation  and placed and oriented properly in the pocket. The orientation was  held utilizing the suture tabs with 2-0 Prolene sutures. The incision  was then closed with 2-0 Vicryl, 3-0 Vicryl and 4-0 Monocryl. Steri-Strips, dry sterile dressings, tape and a supportive bra were  placed on the patient. She tolerated the procedure well and was taken  to the recovery room in stable condition. Fanny Millan MD    D: 07/23/2020 9:18:41       T: 07/23/2020 11:00:44     [id number]  Job#: [id number]     Doc#: [id number]    CC:  Norrine Bolder, NP Pamella Precise, MD Lazarus Ishikawa.  Lucy Lerma MD

## 2020-11-09 NOTE — PROGRESS NOTES
order may or may not be in effect during this procedure. I give my doctor permission to give me blood or blood products. I understand that there are risks with receiving blood such as hepatitis, AIDS, fever, or allergic reaction. I acknowledge that the risks, benefits, and alternatives of this treatment have been explained to me and that no express or implied warranty has been given by the hospital, any blood bank, or any person or entity as to the blood or blood components transfused. At the discretion of my doctor, I agree to allow observers, equipment/product representatives and allow photographing, and/or televising of the procedure, provided my name or identity is maintained confidentially. I agree the hospital may dispose of or use for scientific or educational purposes any tissue, fluid, or body parts which may be removed.     ________________________________Date________Time______ am/pm  ([place] [facility])  Patient or Signature of Closest Relative or Legal Guardian    ________________________________Date________Time______am/pm      Page 1 of  1  Witness

## 2020-11-10 ENCOUNTER — OFFICE VISIT (OUTPATIENT)
Dept: PRIMARY CARE CLINIC | Age: 30
End: 2020-11-10
Payer: COMMERCIAL

## 2020-11-10 PROCEDURE — 99211 OFF/OP EST MAY X REQ PHY/QHP: CPT | Performed by: NURSE PRACTITIONER

## 2020-11-10 NOTE — PROGRESS NOTES
Patient presented to [facility] drive up clinic for preop testing. Patient was swabbed and given information advising them to remain isolated until procedure date.

## 2020-11-11 LAB — SARS-COV-2: NOT DETECTED

## 2020-11-12 RX ORDER — BUPROPION HYDROCHLORIDE 150 MG/1
150 TABLET ORAL NIGHTLY
COMMUNITY

## 2020-11-12 NOTE — PROGRESS NOTES
[facility] PRE-SURGICAL TESTING INSTRUCTIONS                              PRIOR TO PROCEDURE DATE:  1. Please follow any guidelines/instructions prior to your procedure as advised by your surgeon. 2. Arrange for someone to drive you home and be with you for the first 24 hours after discharge for your safety after your procedure for which you received sedation. Ensure it is someone we can share information with regarding your discharge. 3. You must contact your surgeon for instructions IF:   You are taking any blood thinners, aspirin, anti-inflammatory or vitamin E.   There is a change in your physical condition such as a cold, fever, rash, cuts, sores or any other infection, especially near your surgical site. 4. Do not drink alcohol the day before or day of your procedure. 5. A Pre-op History and Physical for surgery MUST be completed by your Physician or Urgent Care within 30 days of your procedure date. Please bring a copy with you on the day of your procedure and along with any other testing performed. THE DAY OF YOUR PROCEDURE:  1. Follow instructions for ARRIVAL TIME as DIRECTED BY YOUR SURGEON. I    2. Enter the MAIN entrance from [employer] and follow the signs to the free [place] or [place] parking (offered free of charge 6am-5pm). 3. Enter the Main Entrance of the hospital (do not enter from the lower level of the parking garage). Upon entrance, check in with the  at the main desk on your left. If no one is available at the desk, proceed into the [facility] Waiting Room and go through the door directly into the [facility]. There is a Check-in desk ACROSS from Room 5 (marked with a sign hanging from the ceiling). The phone number for the surgery center is [phone]. 4. Please call [phone] option #2 option #2 if you have not been preregistered yet. On the day of your procedure bring your insurance card and photo ID.  You will be registered at your bedside once brought back to your room. 5. DO NOT EAT ANYTHING eight hours prior to surgery. May have 8 ounces of water 4 hours prior to surgery. 6. MEDICATIONS    Take the following medications with a SMALL sip of water:    Use your usual dose of inhalers the morning of surgery. BRING your rescue inhaler with you to hospital.    Anesthesia does NOT want you to take insulin the morning of surgery. They will control your blood sugar while you are at the hospital. Please contact your ordering physician for instructions regarding your insulin the night before your procedure. If you have an insulin pump, please keep it set on basal rate. 7. Do not swallow water when brushing teeth. No gum, candy, mints or ice chips. Refrain from smoking or at least decrease the amount. 8. Dress in loose, comfortable clothing appropriate for redressing after your procedure. Do not wear jewelry (including body piercings), make-up (especially NO eye make-up), fingernail polish (NO toenail polish if foot/leg surgery), lotion, powders or metal hairclips. 9. Dentures, glasses, or contacts will need to be removed before your procedure. Bring cases for your glasses, contacts, dentures, or hearing aids to protect them while you are in surgery. 10. If you use a CPAP, please bring it with you on the day of your procedure. 11. We recommend that valuable personal  belongings such as cash, cell phones, e-tablets or jewelry, be left at home during your stay. The hospital will not be responsible for valuables that are not secured in the hospital safe. However, if your insurance requires a co-pay, you may want to bring a method of payment, i.e. Check or credit card, if you wish to pay your co-pay the day of surgery. 12. If you are to stay overnight, you may bring a bag with personal items.  Please have any large items you may need brought in by your family after your arrival to your hospital room.    13. If you have a Living Will or Durable Power of , please bring a copy on the day of your procedure. 15. With your permission, one family member may accompany you while you are being prepared for surgery. Once you are ready, additional family members may join you. HOW WE KEEP YOU SAFE and WORK TO PREVENT SURGICAL SITE INFECTIONS:  1. Health care workers should always check your ID bracelet to verify your name and birth date. You will be asked many times to state your name, date of birth, and allergies. 2. Health care workers should always clean their hands with soap or alcohol gel before providing care to you. It is okay to ask anyone if they cleaned their hands before they touch you. 3. You will be actively involved in verifying the type of procedure you are having and ensuring the correct surgical site. This will be confirmed multiple times prior to your procedure. Do NOT alma your surgery site UNLESS instructed to by your surgeon. 4. Do not shave or wax for 72 hours prior to procedure near your operative site. Shaving with a razor can irritate your skin and make it easier to develop an infection. On the day of your procedure, any hair that needs to be removed near the surgical site will be clipped by a healthcare worker using a special clippers designed to avoid skin irritation. 5. When you are in the operating room, your surgical site will be cleansed with a special soap, and in most cases, you will be given an antibiotic before the surgery begins. What to expect AFTER YOUR PROCEDURE:  1. Immediately following your procedure, your will be taken to the PACU for the first phase of your recovery. Your nurse will help you recover from any potential side effects of anesthesia, such as extreme drowsiness, changes in your vital signs or breathing patterns. Nausea, headache, muscle aches, or sore throat may also occur after anesthesia.   Your nurse will help you manage these potential side effects. 2. For comfort and safety, arrange to have someone at home with you for the first 24 hours after discharge. 3. You and your family will be given written instructions about your diet, activity, dressing care, medications, and return visits. 4. Once at home, should issues with nausea, pain, or bleeding occur, or should you notice any signs of infection, you should call your surgeon. 5. Always clean your hands before and after caring for your wound. Do not let your family touch your surgery site without cleaning their hands. 6. Narcotic pain medications can cause significant constipation. You may want to add a stool softener to your postoperative medication schedule or speak to your surgeon on how best to manage this SIDE EFFECT. SPECIAL INSTRUCTIONS     Thank you for allowing us to care for you. We strive to exceed your expectations in the delivery of care and service provided to you and your family. If you need to contact us for any reason, please call us at [phone]    Instructions reviewed with patient during preadmission testing phone interview. Sada Mitchell. 11/12/2020 .2:08 PM      ADDITIONAL EDUCATIONAL INFORMATION REVIEWED PER PHONE WITH YOU AND/OR YOUR FAMILY:  Yes Bring a urine sample on day of surgery  No Hibiclens® Bathing Instructions   Yes Antibacterial Soap

## 2020-11-13 ENCOUNTER — ANESTHESIA EVENT (OUTPATIENT)
Dept: OPERATING ROOM | Age: 30
End: 2020-11-13
Payer: COMMERCIAL

## 2020-11-16 ENCOUNTER — HOSPITAL ENCOUNTER (OUTPATIENT)
Age: 30
Setting detail: OUTPATIENT SURGERY
Discharge: HOME | End: 2020-11-16
Attending: PLASTIC SURGERY | Admitting: PLASTIC SURGERY
Payer: COMMERCIAL

## 2020-11-16 ENCOUNTER — ANESTHESIA (OUTPATIENT)
Dept: OPERATING ROOM | Age: 30
End: 2020-11-16
Payer: COMMERCIAL

## 2020-11-16 VITALS
RESPIRATION RATE: 16 BRPM | HEART RATE: 99 BPM | WEIGHT: 170 LBS | HEIGHT: 64 IN | SYSTOLIC BLOOD PRESSURE: 125 MMHG | BODY MASS INDEX: 29.02 KG/M2 | TEMPERATURE: 97.6 F | DIASTOLIC BLOOD PRESSURE: 82 MMHG | OXYGEN SATURATION: 97 %

## 2020-11-16 VITALS — TEMPERATURE: 96.1 F | OXYGEN SATURATION: 100 % | DIASTOLIC BLOOD PRESSURE: 66 MMHG | SYSTOLIC BLOOD PRESSURE: 112 MMHG

## 2020-11-16 PROBLEM — C50.911 MALIGNANT NEOPLASM OF RIGHT FEMALE BREAST (HCC): Status: ACTIVE | Noted: 2020-03-03

## 2020-11-16 LAB — PREGNANCY, URINE: NEGATIVE

## 2020-11-16 PROCEDURE — 6360000002 HC RX W HCPCS: Performed by: NURSE ANESTHETIST, CERTIFIED REGISTERED

## 2020-11-16 PROCEDURE — 6360000002 HC RX W HCPCS: Performed by: PLASTIC SURGERY

## 2020-11-16 PROCEDURE — 3700000000 HC ANESTHESIA ATTENDED CARE: Performed by: PLASTIC SURGERY

## 2020-11-16 PROCEDURE — 2580000003 HC RX 258: Performed by: PLASTIC SURGERY

## 2020-11-16 PROCEDURE — 2709999900 HC NON-CHARGEABLE SUPPLY: Performed by: PLASTIC SURGERY

## 2020-11-16 PROCEDURE — 2580000003 HC RX 258: Performed by: ANESTHESIOLOGY

## 2020-11-16 PROCEDURE — 7100000010 HC PHASE II RECOVERY - FIRST 15 MIN: Performed by: PLASTIC SURGERY

## 2020-11-16 PROCEDURE — C1789 PROSTHESIS, BREAST, IMP: HCPCS | Performed by: PLASTIC SURGERY

## 2020-11-16 PROCEDURE — 3700000001 HC ADD 15 MINUTES (ANESTHESIA): Performed by: PLASTIC SURGERY

## 2020-11-16 PROCEDURE — 7100000000 HC PACU RECOVERY - FIRST 15 MIN: Performed by: PLASTIC SURGERY

## 2020-11-16 PROCEDURE — 7100000001 HC PACU RECOVERY - ADDTL 15 MIN: Performed by: PLASTIC SURGERY

## 2020-11-16 PROCEDURE — 84703 CHORIONIC GONADOTROPIN ASSAY: CPT

## 2020-11-16 PROCEDURE — 2500000003 HC RX 250 WO HCPCS: Performed by: NURSE ANESTHETIST, CERTIFIED REGISTERED

## 2020-11-16 PROCEDURE — 3600000015 HC SURGERY LEVEL 5 ADDTL 15MIN: Performed by: PLASTIC SURGERY

## 2020-11-16 PROCEDURE — 6360000002 HC RX W HCPCS: Performed by: ANESTHESIOLOGY

## 2020-11-16 PROCEDURE — 88305 TISSUE EXAM BY PATHOLOGIST: CPT

## 2020-11-16 PROCEDURE — 2500000003 HC RX 250 WO HCPCS: Performed by: PLASTIC SURGERY

## 2020-11-16 PROCEDURE — 7100000011 HC PHASE II RECOVERY - ADDTL 15 MIN: Performed by: PLASTIC SURGERY

## 2020-11-16 PROCEDURE — 3600000005 HC SURGERY LEVEL 5 BASE: Performed by: PLASTIC SURGERY

## 2020-11-16 DEVICE — SMOOTH ROUND HIGH PROFILE GEL-FILLED BREAST IMPLANT, 650CC  SMOOTH ROUND SILICONE
Type: IMPLANTABLE DEVICE | Site: BREAST | Status: FUNCTIONAL
Brand: MENTOR MEMORYGEL BREAST IMPLANT

## 2020-11-16 RX ORDER — GLYCOPYRROLATE 0.2 MG/ML
INJECTION INTRAMUSCULAR; INTRAVENOUS PRN
Status: DISCONTINUED | OUTPATIENT
Start: 2020-11-16 | End: 2020-11-16 | Stop reason: SDUPTHER

## 2020-11-16 RX ORDER — HYDROCODONE BITARTRATE AND ACETAMINOPHEN 5; 325 MG/1; MG/1
1 TABLET ORAL
Status: DISCONTINUED | OUTPATIENT
Start: 2020-11-16 | End: 2020-11-16 | Stop reason: HOSPADM

## 2020-11-16 RX ORDER — 0.9 % SODIUM CHLORIDE 0.9 %
500 INTRAVENOUS SOLUTION INTRAVENOUS
Status: DISCONTINUED | OUTPATIENT
Start: 2020-11-16 | End: 2020-11-16 | Stop reason: HOSPADM

## 2020-11-16 RX ORDER — HYDRALAZINE HYDROCHLORIDE 20 MG/ML
5 INJECTION INTRAMUSCULAR; INTRAVENOUS EVERY 10 MIN PRN
Status: DISCONTINUED | OUTPATIENT
Start: 2020-11-16 | End: 2020-11-16 | Stop reason: HOSPADM

## 2020-11-16 RX ORDER — DIPHENHYDRAMINE HYDROCHLORIDE 50 MG/ML
12.5 INJECTION INTRAMUSCULAR; INTRAVENOUS
Status: DISCONTINUED | OUTPATIENT
Start: 2020-11-16 | End: 2020-11-16 | Stop reason: HOSPADM

## 2020-11-16 RX ORDER — SODIUM CHLORIDE, SODIUM LACTATE, POTASSIUM CHLORIDE, CALCIUM CHLORIDE 600; 310; 30; 20 MG/100ML; MG/100ML; MG/100ML; MG/100ML
INJECTION, SOLUTION INTRAVENOUS CONTINUOUS
Status: DISCONTINUED | OUTPATIENT
Start: 2020-11-16 | End: 2020-11-16 | Stop reason: HOSPADM

## 2020-11-16 RX ORDER — BUPIVACAINE HYDROCHLORIDE AND EPINEPHRINE 2.5; 5 MG/ML; UG/ML
INJECTION, SOLUTION EPIDURAL; INFILTRATION; INTRACAUDAL; PERINEURAL PRN
Status: DISCONTINUED | OUTPATIENT
Start: 2020-11-16 | End: 2020-11-16 | Stop reason: ALTCHOICE

## 2020-11-16 RX ORDER — MEPERIDINE HYDROCHLORIDE 25 MG/ML
12.5 INJECTION INTRAMUSCULAR; INTRAVENOUS; SUBCUTANEOUS EVERY 5 MIN PRN
Status: DISCONTINUED | OUTPATIENT
Start: 2020-11-16 | End: 2020-11-16 | Stop reason: HOSPADM

## 2020-11-16 RX ORDER — LIDOCAINE HYDROCHLORIDE 20 MG/ML
INJECTION, SOLUTION INFILTRATION; PERINEURAL PRN
Status: DISCONTINUED | OUTPATIENT
Start: 2020-11-16 | End: 2020-11-16 | Stop reason: SDUPTHER

## 2020-11-16 RX ORDER — MIDAZOLAM HYDROCHLORIDE 1 MG/ML
INJECTION INTRAMUSCULAR; INTRAVENOUS PRN
Status: DISCONTINUED | OUTPATIENT
Start: 2020-11-16 | End: 2020-11-16 | Stop reason: SDUPTHER

## 2020-11-16 RX ORDER — ROCURONIUM BROMIDE 10 MG/ML
INJECTION, SOLUTION INTRAVENOUS PRN
Status: DISCONTINUED | OUTPATIENT
Start: 2020-11-16 | End: 2020-11-16 | Stop reason: SDUPTHER

## 2020-11-16 RX ORDER — DEXAMETHASONE SODIUM PHOSPHATE 4 MG/ML
INJECTION, SOLUTION INTRA-ARTICULAR; INTRALESIONAL; INTRAMUSCULAR; INTRAVENOUS; SOFT TISSUE PRN
Status: DISCONTINUED | OUTPATIENT
Start: 2020-11-16 | End: 2020-11-16 | Stop reason: SDUPTHER

## 2020-11-16 RX ORDER — CEFAZOLIN SODIUM 2 G/50ML
2 SOLUTION INTRAVENOUS ONCE
Status: COMPLETED | OUTPATIENT
Start: 2020-11-16 | End: 2020-11-16

## 2020-11-16 RX ORDER — PROCHLORPERAZINE EDISYLATE 5 MG/ML
5 INJECTION INTRAMUSCULAR; INTRAVENOUS
Status: DISCONTINUED | OUTPATIENT
Start: 2020-11-16 | End: 2020-11-16 | Stop reason: HOSPADM

## 2020-11-16 RX ORDER — PROPOFOL 10 MG/ML
INJECTION, EMULSION INTRAVENOUS PRN
Status: DISCONTINUED | OUTPATIENT
Start: 2020-11-16 | End: 2020-11-16 | Stop reason: SDUPTHER

## 2020-11-16 RX ORDER — ONDANSETRON 2 MG/ML
INJECTION INTRAMUSCULAR; INTRAVENOUS PRN
Status: DISCONTINUED | OUTPATIENT
Start: 2020-11-16 | End: 2020-11-16 | Stop reason: SDUPTHER

## 2020-11-16 RX ORDER — ONDANSETRON 2 MG/ML
4 INJECTION INTRAMUSCULAR; INTRAVENOUS
Status: DISCONTINUED | OUTPATIENT
Start: 2020-11-16 | End: 2020-11-16 | Stop reason: HOSPADM

## 2020-11-16 RX ADMIN — HYDROMORPHONE HYDROCHLORIDE 0.5 MG: 1 INJECTION, SOLUTION INTRAMUSCULAR; INTRAVENOUS; SUBCUTANEOUS at 14:34

## 2020-11-16 RX ADMIN — ROCURONIUM BROMIDE 70 MG: 10 INJECTION INTRAVENOUS at 11:15

## 2020-11-16 RX ADMIN — SODIUM CHLORIDE, SODIUM LACTATE, POTASSIUM CHLORIDE, AND CALCIUM CHLORIDE: 600; 310; 30; 20 INJECTION, SOLUTION INTRAVENOUS at 09:57

## 2020-11-16 RX ADMIN — HYDROMORPHONE HYDROCHLORIDE 1 MG: 1 INJECTION, SOLUTION INTRAMUSCULAR; INTRAVENOUS; SUBCUTANEOUS at 12:59

## 2020-11-16 RX ADMIN — MIDAZOLAM HYDROCHLORIDE 2 MG: 2 INJECTION, SOLUTION INTRAMUSCULAR; INTRAVENOUS at 11:09

## 2020-11-16 RX ADMIN — PROPOFOL 30 MG: 10 INJECTION, EMULSION INTRAVENOUS at 13:24

## 2020-11-16 RX ADMIN — CEFAZOLIN SODIUM 2 G: 2 SOLUTION INTRAVENOUS at 11:15

## 2020-11-16 RX ADMIN — HYDROMORPHONE HYDROCHLORIDE 0.5 MG: 1 INJECTION, SOLUTION INTRAMUSCULAR; INTRAVENOUS; SUBCUTANEOUS at 14:06

## 2020-11-16 RX ADMIN — HYDROMORPHONE HYDROCHLORIDE 1 MG: 1 INJECTION, SOLUTION INTRAMUSCULAR; INTRAVENOUS; SUBCUTANEOUS at 11:15

## 2020-11-16 RX ADMIN — PROPOFOL 250 MG: 10 INJECTION, EMULSION INTRAVENOUS at 11:15

## 2020-11-16 RX ADMIN — GLYCOPYRROLATE 0.4 MG: 0.2 INJECTION INTRAMUSCULAR; INTRAVENOUS at 13:24

## 2020-11-16 RX ADMIN — NEOSTIGMINE METHYLSULFATE 3 MG: 1 INJECTION INTRAMUSCULAR; INTRAVENOUS; SUBCUTANEOUS at 13:24

## 2020-11-16 RX ADMIN — FAMOTIDINE 20 MG: 10 INJECTION, SOLUTION INTRAVENOUS at 11:15

## 2020-11-16 RX ADMIN — ONDANSETRON 4 MG: 2 INJECTION INTRAMUSCULAR; INTRAVENOUS at 11:15

## 2020-11-16 RX ADMIN — SODIUM CHLORIDE, SODIUM LACTATE, POTASSIUM CHLORIDE, AND CALCIUM CHLORIDE: 600; 310; 30; 20 INJECTION, SOLUTION INTRAVENOUS at 11:59

## 2020-11-16 RX ADMIN — LIDOCAINE HYDROCHLORIDE 100 MG: 20 INJECTION, SOLUTION INFILTRATION; PERINEURAL at 11:15

## 2020-11-16 RX ADMIN — DEXAMETHASONE SODIUM PHOSPHATE 8 MG: 4 INJECTION, SOLUTION INTRAMUSCULAR; INTRAVENOUS at 11:15

## 2020-11-16 RX ADMIN — SODIUM CHLORIDE, SODIUM LACTATE, POTASSIUM CHLORIDE, AND CALCIUM CHLORIDE: 600; 310; 30; 20 INJECTION, SOLUTION INTRAVENOUS at 09:54

## 2020-11-16 ASSESSMENT — PULMONARY FUNCTION TESTS
PIF_VALUE: 19
PIF_VALUE: 20
PIF_VALUE: 20
PIF_VALUE: 16
PIF_VALUE: 20
PIF_VALUE: 20
PIF_VALUE: 19
PIF_VALUE: 20
PIF_VALUE: 8
PIF_VALUE: 19
PIF_VALUE: 20
PIF_VALUE: 19
PIF_VALUE: 20
PIF_VALUE: 20
PIF_VALUE: 19
PIF_VALUE: 20
PIF_VALUE: 19
PIF_VALUE: 19
PIF_VALUE: 20
PIF_VALUE: 2
PIF_VALUE: 20
PIF_VALUE: 19
PIF_VALUE: 20
PIF_VALUE: 20
PIF_VALUE: 19
PIF_VALUE: 20
PIF_VALUE: 19
PIF_VALUE: 20
PIF_VALUE: 19
PIF_VALUE: 19
PIF_VALUE: 20
PIF_VALUE: 20
PIF_VALUE: 19
PIF_VALUE: 20
PIF_VALUE: 19
PIF_VALUE: 19
PIF_VALUE: 20
PIF_VALUE: 16
PIF_VALUE: 20
PIF_VALUE: 2
PIF_VALUE: 19
PIF_VALUE: 20
PIF_VALUE: 13
PIF_VALUE: 19
PIF_VALUE: 1
PIF_VALUE: 20
PIF_VALUE: 0
PIF_VALUE: 19
PIF_VALUE: 21
PIF_VALUE: 20
PIF_VALUE: 19
PIF_VALUE: 19
PIF_VALUE: 20
PIF_VALUE: 20
PIF_VALUE: 19
PIF_VALUE: 0
PIF_VALUE: 1
PIF_VALUE: 20
PIF_VALUE: 19
PIF_VALUE: 21
PIF_VALUE: 20
PIF_VALUE: 3
PIF_VALUE: 20
PIF_VALUE: 21
PIF_VALUE: 17
PIF_VALUE: 19
PIF_VALUE: 19
PIF_VALUE: 20
PIF_VALUE: 19
PIF_VALUE: 16
PIF_VALUE: 20
PIF_VALUE: 2
PIF_VALUE: 20
PIF_VALUE: 20
PIF_VALUE: 12
PIF_VALUE: 19
PIF_VALUE: 19
PIF_VALUE: 20
PIF_VALUE: 19
PIF_VALUE: 4
PIF_VALUE: 20
PIF_VALUE: 0
PIF_VALUE: 21
PIF_VALUE: 20
PIF_VALUE: 21
PIF_VALUE: 20
PIF_VALUE: 13
PIF_VALUE: 19
PIF_VALUE: 20
PIF_VALUE: 19
PIF_VALUE: 19
PIF_VALUE: 21
PIF_VALUE: 19
PIF_VALUE: 20
PIF_VALUE: 19
PIF_VALUE: 20
PIF_VALUE: 19
PIF_VALUE: 21
PIF_VALUE: 20
PIF_VALUE: 19
PIF_VALUE: 20
PIF_VALUE: 19
PIF_VALUE: 20
PIF_VALUE: 19
PIF_VALUE: 20
PIF_VALUE: 20
PIF_VALUE: 19
PIF_VALUE: 0
PIF_VALUE: 21
PIF_VALUE: 2
PIF_VALUE: 19
PIF_VALUE: 20
PIF_VALUE: 21
PIF_VALUE: 19
PIF_VALUE: 19

## 2020-11-16 ASSESSMENT — PAIN DESCRIPTION - DESCRIPTORS
DESCRIPTORS: PRESSURE
DESCRIPTORS: OTHER (COMMENT)
DESCRIPTORS: OTHER (COMMENT)

## 2020-11-16 ASSESSMENT — PAIN DESCRIPTION - PAIN TYPE
TYPE: SURGICAL PAIN

## 2020-11-16 ASSESSMENT — PAIN DESCRIPTION - FREQUENCY
FREQUENCY: CONTINUOUS
FREQUENCY: CONTINUOUS

## 2020-11-16 ASSESSMENT — PAIN SCALES - GENERAL
PAINLEVEL_OUTOF10: 4
PAINLEVEL_OUTOF10: 7
PAINLEVEL_OUTOF10: 4
PAINLEVEL_OUTOF10: 7
PAINLEVEL_OUTOF10: 7

## 2020-11-16 ASSESSMENT — PAIN - FUNCTIONAL ASSESSMENT
PAIN_FUNCTIONAL_ASSESSMENT: 0-10
PAIN_FUNCTIONAL_ASSESSMENT: PREVENTS OR INTERFERES SOME ACTIVE ACTIVITIES AND ADLS

## 2020-11-16 ASSESSMENT — PAIN DESCRIPTION - LOCATION
LOCATION: CHEST
LOCATION: RIB CAGE
LOCATION: CHEST

## 2020-11-16 ASSESSMENT — PAIN DESCRIPTION - ONSET
ONSET: ON-GOING
ONSET: PROGRESSIVE
ONSET: PROGRESSIVE

## 2020-11-16 ASSESSMENT — PAIN DESCRIPTION - ORIENTATION
ORIENTATION: RIGHT
ORIENTATION: RIGHT
ORIENTATION: RIGHT;LEFT

## 2020-11-16 ASSESSMENT — LIFESTYLE VARIABLES: SMOKING_STATUS: 0

## 2020-11-16 ASSESSMENT — PAIN DESCRIPTION - PROGRESSION: CLINICAL_PROGRESSION: NOT CHANGED

## 2020-11-16 NOTE — PROGRESS NOTES
Ambulatory Surgery/Procedure Discharge Note    Vitals:    11/16/20 1534   BP: 125/82   Pulse: 99   Resp: 16   Temp: 97.6 °F (36.4 °C)   SpO2: 97%     Pt met criteria for discharge per Cliff score. In: 522.5 [P.O.:260; I.V.:262.5]  Out: - denied need      Restroom use offered before discharge. Yes    Pain assessment:  present - adequately treated  Pain Level: 4      Pt alert and oriented x4. IV removed. Denies N/V or pain. Dressing and bra to bilateral breasts intact. Denied need to Void prior to discharge. Instructed  pt needs to void before 2200 or call MD if unable to void. Discharge instructions given to pt and  with pt permission. Pt and  reported pt got her prescriptions at her pre op appointment and already has them filled at home. Left with all belongings and discharge instructions. Patient discharged to home/self care.  Patient discharged via wheel chair by transporter to waiting family/S.O.       11/16/2020 4:44 PM

## 2020-11-16 NOTE — PROGRESS NOTES
PACU Transfer to Hospitals in [place]  Pt's Current Allergies: Adhesive tape; Betadine [povidone iodine]; and No known allergies    Pt meets criteria to transfer to next phase of care per NIKHIL SCORE and ASPAN standards    No results for input(s): POCGLU in the last 72 hours. Vitals:    11/16/20 1515   BP: 134/85   Pulse: 98   Resp: 17   Temp: 97.1 °F (36.2 °C)   SpO2: 99%         Intake/Output Summary (Last 24 hours) at 11/16/2020 1517  Last data filed at 11/16/2020 1505  Gross per 24 hour   Intake 1722.5 ml   Output 50 ml   Net 1672.5 ml         Pain assessment: yes    Pain Level: 4    Patient was assessed for unknown alterations to skin integrity. There were not unknown alterations observed. Patient transferred to care of Esteban Pritchard RN.   Family updated and directed to Esteban Pritchard    11/16/2020 3:17 PM

## 2020-11-16 NOTE — PROGRESS NOTES
[facility] / [facility] ([facility]) [address] [facility], [address]    Acknowledgment of Informed Consent for Surgical or Medical Procedure and Sedation  I agree to allow doctor(s) Ganesh Alfaro and his/her associates or assistants, including residents and/or other qualified medical practitioner to perform the following medical treatment or procedure and to administer or direct the administration of sedation as necessary:  Procedure(s): [address] My doctor has explained the following regarding the proposed procedure:   the explanation of the procedure   the benefits of the procedure   the potential problems that might occur during recuperation   the risks and side effects of the procedure which could include but are not limited to severe blood loss, infection, stroke or death   the benefits, risks and side effect of alternative procedures including the consequences of declining this procedure or any alternative procedures   the likelihood of achieving satisfactory results. I acknowledge no guarantee or assurance has been made to me regarding the results. I understand that during the course of this treatment/procedure, unforeseen conditions can occur which require an additional or different procedure. I agree to allow my physician or assistants to perform such extension of the original procedure as they may find necessary. I understand that sedation will often result in temporary impairment of memory and fine motor skills and that sedation can occasionally progress to a state of deep sedation or general anesthesia. I understand the risks of anesthesia for surgery include, but are not limited to, sore throat, hoarseness, injury to face, mouth, or teeth; nausea; headache; injury to blood vessels or nerves; death, brain damage, or paralysis.     I understand that if I have a Limitation of Treatment order in effect during my hospitalization, the order may or may not be in effect during this procedure. I give my doctor permission to give me blood or blood products. I understand that there are risks with receiving blood such as hepatitis, AIDS, fever, or allergic reaction. I acknowledge that the risks, benefits, and alternatives of this treatment have been explained to me and that no express or implied warranty has been given by the hospital, any blood bank, or any person or entity as to the blood or blood components transfused. At the discretion of my doctor, I agree to allow observers, equipment/product representatives and allow photographing, and/or televising of the procedure, provided my name or identity is maintained confidentially. I agree the hospital may dispose of or use for scientific or educational purposes any tissue, fluid, or body parts which may be removed.     ________________________________Date________Time______ am/pm  ([place] [facility])  Patient or Signature of Closest Relative or Legal Guardian    ________________________________Date________Time______am/pm      Page 1 of  1  Witness

## 2020-11-16 NOTE — H&P
Sonia Bonnyjade    [id number]      Department of General Surgery    Surgical Services     Pre-operative History and Physical      INDICATION:   Malignant neoplasm of right female breast, unspecified estrogen receptor status, unspecified site of breast ([facility] [address] [C50.911]    Procedure(s):  BILATERAL SECOND STAGE BREAST RECONSTRUCTION    CHIEF COMPLAINT:      History obtained from: Patient interview and EHR     HISTORY:   The patient is a 26 y.o. female with a past medical and surgical history are delineated below. Patient with hx of right breast cancer. S/P bilateral mastectomy and stage 1 reconstruction presents today for removal of port and stage 2 bilateral breast construction. Weight loss/gain:  No  Recent Hx Steroid use: No  History of allergic reaction to anesthesia:  No  Covid 19:  Patient denies fever, chills, cough or known exposure to Covid-19.       Past Medical History:        Diagnosis Date    Breast CA ([facility] [address]     right breast    Prolonged emergence from general anesthesia     Thyroid disease     hypothyroid     Past Surgical History:        Procedure Laterality Date    BREAST ENHANCEMENT SURGERY Bilateral 3/2/2020    BILATERAL FIRST STAGE BREAST RECONSTRUCTION performed by Juma Brito MD at [address] [facility] 2020    INSERTION OF TISUUE EXPANDER LEFT SIDE CHEST WALL performed by Juma Brito MD at [address]      core bx     SECTION      CHOLECYSTECTOMY  2016    MASTECTOMY Bilateral 3/2/2020    BILATERAL SIMPLE MASTECTOMY, RIGHT SENTINEL NODE BIOPSY (BLUE DYE ONLY)/ performed by Gem Weiss MD at [address]      left tissue expander removed    PORT SURGERY N/A 10/16/2019    SINGLE LUMEN PORT-A-CATHETER INSERTION LEFT SUBCLAVIAN performed by Gem Weiss MD at [address] EXTRACTION         Medications Prior to Admission:   Prior to Admission medications    Medication Sig Start Date End Date Taking?  Authorizing Provider   buPROPion (WELLBUTRIN XL) 150 MG extended release tablet Take 150 mg by mouth nightly   Yes Historical Provider, MD   tamoxifen (NOLVADEX) 20 MG tablet  6/17/20  Yes Historical Provider, MD   ondansetron (ZOFRAN) 4 MG tablet Take 1 tablet by mouth every 8 hours as needed for Nausea or Vomiting 11/26/19  Yes Jackson Taylor MD   lidocaine-prilocaine (EMLA) 2.5-2.5 % cream Apply to port iste one hour prior to treatment, cover with saran wrap or bandaid 10/16/19  Yes Bell York MD       Allergies:  Adhesive tape; Betadine [povidone iodine]; and No known allergies    Social History:   Social History     Socioeconomic History    Marital status:      Spouse name: None    Number of children: None    Years of education: None    Highest education level: None   Occupational History    None   Social Needs    Financial resource strain: None    Food insecurity     Worry: None     Inability: None    Transportation needs     Medical: None     Non-medical: None   Tobacco Use    Smoking status: Never Smoker    Smokeless tobacco: Never Used   Substance and Sexual Activity    Alcohol use: Yes     Comment: occasionally    Drug use: No    Sexual activity: Yes     Partners: Male   Lifestyle    Physical activity     Days per week: None     Minutes per session: None    Stress: None   Relationships    Social connections     Talks on phone: None     Gets together: None     Attends Sikh service: None     Active member of club or organization: None     Attends meetings of clubs or organizations: None     Relationship status: None    Intimate partner violence     Fear of current or ex partner: None     Emotionally abused: None     Physically abused: None     Forced sexual activity: None   Other Topics Concern    None   Social History Narrative    None         Family History:       Problem Relation Age of Onset    Cancer Paternal Grandmother         breast    Thyroid Disease Paternal Grandmother          REVIEW OF SYSTEMS:    Constitutional: Negative for chills, fatigue and fever. HENT: Negative for loss of hearing   Eyes: Negative for visual disturbance. Respiratory: Negative for  cough, chest tightness, shortness of breath and wheezing. Cardiovascular: Negative for chest pain, palpitations and exertional chest pressure  Gastrointestinal: Negative for constipation, diarrhea, nausea and vomiting. Musculoskeletal: Negative for gait problem, and joint swelling. Skin: Negative for rash and nonhealing wounds. Neurological: Negative for dizziness, syncope, light-headedness,    Hematological: Does not bruise/bleed easily. Psychiatric/Behavioral: Negative for agitation    PHYSICAL EXAM:      /78   Pulse 89   Temp 98.2 °F (36.8 °C) (Oral)   Resp 16   Ht 5' 4\" (1.626 m)   Wt 170 lb (77.1 kg)   SpO2 98%   BMI 29.18 kg/m²  I      Eyes:  pupils equal, round and reactive to light and conjunctiva normal    Head/ENT:  Normocephalic, without obvious abnormality, atramatic,     Neck:  Supple, symmetrical, trachea midline, no adenopathy, thyroid symmetric, not enlarged and no tenderness, skin warm and dry. Heart: regular rate and rhythm    Lungs:  No increased work of breathing, good air exchange, clear to auscultation bilaterally, no crackles or wheezing    Abdomen:  Normal bowel sounds, soft, non-distended, non-tender, no masses palpated    Extremities:  No clubbing, cyanosis, or edema      ASSESSMENT AND PLAN:    1. Patient is a 26 y.o. female with above specified procedure planned. 2.  Access to ancillary services are available per request of the provider.     Mani Fuel   11/16/2020

## 2020-11-16 NOTE — ANESTHESIA PRE PROCEDURE
Department of Anesthesiology  Preprocedure Note       Name:  Sin Bergeron   Age:  26 y.o.  :  1990                                          MRN:  [id number]         Date:  2020      Surgeon: Adela Isbell):  Danielle Ambrose MD    Procedure: Procedure(s):  BILATERAL SECOND STAGE BREAST RECONSTRUCTION    Medications prior to admission:   Prior to Admission medications    Medication Sig Start Date End Date Taking?  Authorizing Provider   buPROPion (WELLBUTRIN XL) 150 MG extended release tablet Take 150 mg by mouth nightly   Yes Historical Provider, MD   tamoxifen (NOLVADEX) 20 MG tablet  20  Yes Historical Provider, MD   ondansetron (ZOFRAN) 4 MG tablet Take 1 tablet by mouth every 8 hours as needed for Nausea or Vomiting 19  Yes Jennifer Mahoney MD   lidocaine-prilocaine (EMLA) 2.5-2.5 % cream Apply to port iste one hour prior to treatment, cover with saran wrap or bandaid 10/16/19  Yes Radha Rehman MD       Current medications:    Current Facility-Administered Medications   Medication Dose Route Frequency Provider Last Rate Last Dose    ceFAZolin (ANCEF) 2 g in dextrose 3 % 50 mL IVPB (duplex)  2 g Intravenous Once Singh Silva III, MD        lactated ringers infusion   Intravenous Continuous Milta Weeks,  mL/hr at 20 0957      HYDROmorphone (DILAUDID) injection 0.25 mg  0.25 mg Intravenous Q5 Min PRN Milta Weeks, DO        HYDROmorphone (DILAUDID) injection 0.5 mg  0.5 mg Intravenous Q5 Min PRN Milta Weeks, DO        HYDROcodone-acetaminophen [facility]) 5-325 MG per tablet 1 tablet  1 tablet Oral Once PRN Milta Weeks, DO        diphenhydrAMINE (BENADRYL) injection 12.5 mg  12.5 mg Intravenous Once PRN Milta Weeks, DO        0.9 % sodium chloride bolus  500 mL Intravenous Once PRN Milta Weeks, DO        ondansetron [facility]) injection 4 mg  4 mg Intravenous Once PRN Milta Weeks, DO        prochlorperazine (COMPAZINE) injection 5 mg  5 mg Intravenous Once PRN Janae Colace, DO        hydrALAZINE (APRESOLINE) injection 5 mg  5 mg Intravenous Q10 Min PRN Janae Colace, DO        meperidine (DEMEROL) injection 12.5 mg  12.5 mg Intravenous Q5 Min PRN Janae Colace, DO           Allergies: Allergies   Allergen Reactions    Adhesive Tape Hives and Itching     AND TEGADERM (BLISTERS)    Betadine [Povidone Iodine]      Blisters rash    No Known Allergies        Problem List:    Patient Active Problem List   Diagnosis Code    H/O  section Z98.891    Polyhydramnios O40. 9XX0    Symptomatic cholelithiasis K80.20     labor third trimester with  delivery third trimester I18.12W4    Umbilical hernia without obstruction and without gangrene K42.9    Malignant neoplasm of lower-outer quadrant of right breast of female, estrogen receptor positive (HCC) C50.511, Z17.0    Lethargy R53.83    Acute ethmoidal sinusitis J01.20    Leukocytosis D72.829    Breast cancer of lower-outer quadrant of right female breast (HCC) C50.511    Malignant neoplasm of lower-outer quadrant of right female breast ([address] C50.511    Status post bilateral mastectomy Z90.13       Past Medical History:        Diagnosis Date    Breast CA ([address]     right breast    Prolonged emergence from general anesthesia     Thyroid disease     hypothyroid       Past Surgical History:        Procedure Laterality Date    BREAST ENHANCEMENT SURGERY Bilateral 3/2/2020    BILATERAL FIRST STAGE BREAST RECONSTRUCTION performed by Samantha Dominique MD at [address] Left 2020    INSERTION OF TISUUE EXPANDER LEFT SIDE CHEST WALL performed by Samantha Dominique MD at [address]      core bx     SECTION      CHOLECYSTECTOMY  2016    MASTECTOMY Bilateral 3/2/2020    BILATERAL SIMPLE MASTECTOMY, RIGHT SENTINEL NODE BIOPSY (BLUE DYE ONLY)/ performed by Gerard Schofield MD at [address]  OTHER SURGICAL HISTORY      left tissue expander removed    PORT SURGERY N/A 10/16/2019    SINGLE LUMEN PORT-A-CATHETER INSERTION LEFT SUBCLAVIAN performed by Rey Martinez MD at [address] EXTRACTION         Social History:    Social History     Tobacco Use    Smoking status: Never Smoker    Smokeless tobacco: Never Used   Substance Use Topics    Alcohol use: Yes     Comment: occasionally                                Counseling given: Not Answered      Vital Signs (Current):   Vitals:    11/12/20 1404   Weight: 170 lb (77.1 kg)   Height: 5' 4\" (1.626 m)                                              BP Readings from Last 3 Encounters:   07/23/20 121/70   07/23/20 116/77   03/04/20 111/73       NPO Status: Time of last liquid consumption: 2200                        Time of last solid consumption: 2200                        Date of last liquid consumption: 11/15/20                        Date of last solid food consumption: 11/15/20    BMI:   Wt Readings from Last 3 Encounters:   11/12/20 170 lb (77.1 kg)   07/23/20 176 lb (79.8 kg)   03/02/20 189 lb (85.7 kg)     Body mass index is 29.18 kg/m². CBC:   Lab Results   Component Value Date    WBC 7.2 03/02/2020    RBC 4.10 03/02/2020    HGB 12.9 03/02/2020    HCT 37.7 03/02/2020    MCV 92.1 03/02/2020    RDW 14.1 03/02/2020     03/02/2020       CMP:   Lab Results   Component Value Date     03/02/2020    K 4.7 03/02/2020    K 4.2 11/24/2019     03/02/2020    CO2 22 03/02/2020    BUN 11 03/02/2020    CREATININE 0.5 03/02/2020    GFRAA >60 03/02/2020    AGRATIO 1.8 03/02/2020    LABGLOM >60 03/02/2020    GLUCOSE 93 03/02/2020    PROT 7.2 03/02/2020    CALCIUM 9.1 03/02/2020    BILITOT 0.5 03/02/2020    ALKPHOS 81 03/02/2020    AST 38 03/02/2020    ALT 34 03/02/2020       POC Tests: No results for input(s): POCGLU, POCNA, POCK, POCCL, POCBUN, POCHEMO, POCHCT in the last 72 hours.     Coags: No results found for: PROTIME, INR, APTT    HCG (If Applicable):   Lab Results   Component Value Date    PREGTESTUR Negative 11/16/2020        ABGs: No results found for: PHART, PO2ART, KYT7OGF, TWJ6AMB, BEART, I3SLJHUV     Type & Screen (If Applicable):  No results found for: LABABO, LABRH    Drug/Infectious Status (If Applicable):  No results found for: HIV, HEPCAB    COVID-19 Screening (If Applicable):   Lab Results   Component Value Date    COVID19 Not Detected 11/10/2020         Anesthesia Evaluation  Patient summary reviewed and Nursing notes reviewed history of anesthetic complications (slow emergence ):   Airway: Mallampati: I  TM distance: >3 FB   Neck ROM: full  Mouth opening: > = 3 FB Dental: normal exam         Pulmonary: breath sounds clear to auscultation      (-) not a current smoker (never)                           Cardiovascular:  Exercise tolerance: good (>4 METS),       (-) past MI    NYHA Classification: I    Rhythm: regular  Rate: normal           Beta Blocker:  Dose within 24 Hrs         Neuro/Psych:               GI/Hepatic/Renal:        (-) GERD       Endo/Other:    (+) malignancy/cancer (bilateral mastectomy 3/2020  prior chemo   ). Abdominal:           Vascular:                                        Anesthesia Plan      general     ASA 3           MIPS: Postoperative opioids intended and Prophylactic antiemetics administered. Anesthetic plan and risks discussed with patient and spouse. Plan discussed with CRNA.     Attending anesthesiologist reviewed and agrees with Pre Eval content              Jaquan Castillo DO   11/16/2020

## 2020-11-16 NOTE — PROGRESS NOTES
Current Allergies: Adhesive tape; Betadine [povidone iodine]; and No known allergies    No results for input(s): POCGLU in the last 72 hours. Admitted to PACU bed 12 from OR. Arrived on a stretcher . Attached to PACU monitoring system. Alarms and parameters set. Report received from anesthesia personnel. OR staff did not report skin issues that were observed while in OR  No problems reported intraoperatively. Pt arrived with oxygen per nasal cannula with oxygen at 4 liters. Athrombic wraps in place.      Received report from Arturo Herrera CRNA    Patient came out with oral airway in place

## 2020-11-16 NOTE — PROGRESS NOTES
Pt awake alert and oriented. Pt reports slight nausea. Refuses medication at this time. States \"I'm a nauseous person\". Pt states some saltines should relieve nausea. Pt eating saltines and water.  in room with pt. Will continue to monitor.

## 2020-11-17 NOTE — OP NOTE
[facility] [address], [address]                                OPERATIVE REPORT    PATIENT NAME: Rene Wall               :        1990  MED REC NO:   [id number]                          ROOM:  ACCOUNT NO:   [de-identified]                           ADMIT DATE: 2020  PROVIDER:     Pina Antoine MD    DATE OF PROCEDURE:  2020    PREOPERATIVE DIAGNOSES:  1. History of breast cancer. 2.  Status post bilateral mastectomies. 3.  Status post bilateral first stage breast reconstruction. 4.  Status post left subclavian Port-A-Cath placement. POSTOPERATIVE DIAGNOSES:  1. History of breast cancer. 2.  Status post bilateral mastectomies. 3.  Status post bilateral first stage breast reconstruction. 4.  Status post left subclavian Port-A-Cath placement. OPERATION PERFORMED:  1.  Bilateral second stage breast reconstruction (with removal of tissue  expanders and placement of implants). 2.  Removal of Port-A-Cath. SURGEON:  Jeison Pires MD.    ANESTHESIA:  General.    ESTIMATED BLOOD LOSS:  45 mL. LOCAL ANESTHETIC USED:  40 mL of 0.25% Marcaine with 1:200,000  epinephrine. IMPLANTS USED:  MENTOR Smooth Round 650 mL High Profile silicone  gel-filled implants placed bilaterally. SPECIMENS:  None. FINDINGS:  Port-A-Cath was intact upon removal.    DISPOSITION:  PACU. HISTORY:  This is a 55-year-old female who has undergone bilateral  mastectomies and first stage breast reconstruction. She has  subsequently been expanded and returned for second stage breast  reconstruction. The port is to be removed according to her general  surgeon, Chencho Leung MD, and this will be done today as well. Risks of the procedures were explained to the patient. All of her  questions were answered. Activity restrictions she will need to follow  were explained.   Informed consent was obtained. DETAILS OF PROCEDURE:  The patient was marked in the sitting position in  the preoperative holding area and then taken back to the operating room. She was placed supine on the operating room table and given general  anesthesia. An airway was placed. She was then prepped and draped in a  sterile fashion. On the left side, IMF component to her mastectomy scar was reincised. Dissection was carried through the subcutaneous tissue. The expander  was removed. Capsulotomies were needed medially and superiorly. Hemostasis was maintained throughout using the cautery. At this point, the scar overlying her Port-A-Cath was incised. Dissection was performed. We removed the Port-A-Cath from its  surrounding capsule. The sutures were removed. A purse-string suture  was placed, but not tied around the entrance of the port. Digital  pressure was held over the area above the port entrance side as it was  removed. The Port-A-Cath was intact. The purse-string suture was then  tied down and then the wound was closed with 3-0 Vicryl and 4-0  Monocryl. A sizer had been used on the left side and pocket adjustments  were made based on the appearance of the sizer. On the right side, again the IMF component of the mastectomy scar was  reincised, the capsule was entered and the expander was removed. This  expander was sitting more lateral and inferior compared to the left. Therefore, in addition to the capsulotomies medially and superiorly,  capsulorrhaphies were performed laterally and inferiorly. Again, a  sizer was used and pocket adjustments were made based on the appearance  of the sizer. Both sides had a sizer removed. Both sides were irrigated with  antibiotic solution. Inspection revealed no bleeding on either side. The implants were brought up onto the field, soaked in antibiotic  irrigation, and placed and oriented properly in the pocket.   There was  good symmetry in both supine and sitting positions. The wounds were  then closed with 2 layers of 3-0 Vicryl suture and 4-0 Monocryl. She  had excess skin of the lateral aspects of the chest wall, which were  excised as part of the procedure and these areas were also closed with  3-0 Vicryl and 4-0 Monocryl. Xeroform gauze was placed over the suture line. This was covered with  dry sterile gauze and tape, and then the patient was placed into a  postoperative bra. The patient tolerated the procedure well and was then taken to the  recovery room in stable condition.         Sky Odonnell MD    D: 11/16/2020 13:53:03       T: 11/16/2020 15:35:06     [email or web address]  Job#: [id number]     Doc#: [id number]    CC:  MD Leslie Llamas MD

## 2021-04-02 ENCOUNTER — HOSPITAL ENCOUNTER (OUTPATIENT)
Dept: NUCLEAR MEDICINE | Age: 31
Discharge: HOME | End: 2021-04-02
Payer: OTHER GOVERNMENT

## 2021-04-02 ENCOUNTER — HOSPITAL ENCOUNTER (OUTPATIENT)
Dept: CT IMAGING | Age: 31
Discharge: HOME | End: 2021-04-02
Payer: OTHER GOVERNMENT

## 2021-04-02 DIAGNOSIS — Z17.0 MALIGNANT NEOPLASM OF LOWER-OUTER QUADRANT OF RIGHT BREAST OF FEMALE, ESTROGEN RECEPTOR POSITIVE (HCC): ICD-10-CM

## 2021-04-02 DIAGNOSIS — C50.511 MALIGNANT NEOPLASM OF LOWER-OUTER QUADRANT OF RIGHT BREAST OF FEMALE, ESTROGEN RECEPTOR POSITIVE (HCC): ICD-10-CM

## 2021-04-02 PROCEDURE — 74177 CT ABD & PELVIS W/CONTRAST: CPT

## 2021-04-02 PROCEDURE — A9503 TC99M MEDRONATE: HCPCS | Performed by: SURGERY

## 2021-04-02 PROCEDURE — 6360000004 HC RX CONTRAST MEDICATION: Performed by: SURGERY

## 2021-04-02 PROCEDURE — 78306 BONE IMAGING WHOLE BODY: CPT

## 2021-04-02 PROCEDURE — 3430000000 HC RX DIAGNOSTIC RADIOPHARMACEUTICAL: Performed by: SURGERY

## 2021-04-02 RX ORDER — TC 99M MEDRONATE 20 MG/10ML
25 INJECTION, POWDER, LYOPHILIZED, FOR SOLUTION INTRAVENOUS
Status: COMPLETED | OUTPATIENT
Start: 2021-04-02 | End: 2021-04-02

## 2021-04-02 RX ADMIN — TC 99M MEDRONATE 25 MILLICURIE: 20 INJECTION, POWDER, LYOPHILIZED, FOR SOLUTION INTRAVENOUS at 08:53

## 2021-04-02 RX ADMIN — IOPAMIDOL 75 ML: 755 INJECTION, SOLUTION INTRAVENOUS at 08:40

## 2021-04-02 RX ADMIN — IOHEXOL 50 ML: 240 INJECTION, SOLUTION INTRATHECAL; INTRAVASCULAR; INTRAVENOUS; ORAL at 08:40

## 2021-06-29 ENCOUNTER — HOSPITAL ENCOUNTER (OUTPATIENT)
Dept: CT IMAGING | Age: 31
Discharge: HOME | End: 2021-06-29
Payer: OTHER GOVERNMENT

## 2021-06-29 DIAGNOSIS — C50.311 MALIGNANT NEOPLASM OF LOWER-INNER QUADRANT OF RIGHT FEMALE BREAST, UNSPECIFIED ESTROGEN RECEPTOR STATUS (HCC): ICD-10-CM

## 2021-06-29 PROCEDURE — 71260 CT THORAX DX C+: CPT

## 2021-06-29 PROCEDURE — 6360000004 HC RX CONTRAST MEDICATION: Performed by: INTERNAL MEDICINE

## 2021-06-29 RX ADMIN — IOPAMIDOL 75 ML: 755 INJECTION, SOLUTION INTRAVENOUS at 17:27

## 2021-10-29 ENCOUNTER — HOSPITAL ENCOUNTER (OUTPATIENT)
Dept: ULTRASOUND IMAGING | Age: 31
Discharge: HOME | End: 2021-10-29
Payer: OTHER GOVERNMENT

## 2021-10-29 DIAGNOSIS — Z17.0 MALIGNANT NEOPLASM OF LOWER-INNER QUADRANT OF RIGHT BREAST OF FEMALE, ESTROGEN RECEPTOR POSITIVE (HCC): ICD-10-CM

## 2021-10-29 DIAGNOSIS — C50.311 MALIGNANT NEOPLASM OF LOWER-INNER QUADRANT OF RIGHT BREAST OF FEMALE, ESTROGEN RECEPTOR POSITIVE (HCC): ICD-10-CM

## 2021-10-29 PROCEDURE — 76642 ULTRASOUND BREAST LIMITED: CPT

## 2021-10-29 NOTE — PROGRESS NOTES
Dr. Chanda Teixeira spoke to patient regarding recommendation for breast biopsy. Pt will contact Dr Martin Blanchard and Dr Gely Payne for follow up breast care. Pt verbalized understanding and will call to schedule an appt with Dr. Gely Payne. All questions answered at this time. Records faxed to Dr. Gely Payne.

## 2021-11-01 RX ORDER — VITAMIN B COMPLEX
1000 TABLET ORAL EVERY EVENING
COMMUNITY

## 2021-11-01 RX ORDER — M-VIT,TX,IRON,MINS/CALC/FOLIC 27MG-0.4MG
1 TABLET ORAL EVERY EVENING
COMMUNITY
End: 2022-03-21

## 2021-11-01 NOTE — PROGRESS NOTES
you.    For your comfort, please wear simple loose fitting clothing to the hospital.  Please do not bring valuables. Do not wear any make-up or nail polish on your fingers or toes      For your safety, please do not wear any jewelry or body piercing's on the day of surgery. All jewelry must be removed. If you have dentures, they will be removed before going to operating room. For your convenience, we will provide you with a container. If you wear contact lenses or glasses, they will be removed, please bring a case for them. If you have a living will and a durable power of  for healthcare, please bring in a copy. As part of our patient safety program to minimize surgical site infections, we ask you to do the following:    · Please notify your surgeon if you develop any illness between         now and the  day of your surgery. · This includes a cough, cold, fever, sore throat, nausea,         or vomiting, and diarrhea, etc.  ·  Please notify your surgeon if you experience dizziness, shortness         of breath or blurred vision between now and the time of your surgery. Do not shave your operative site 96 hours prior to surgery. For face and neck surgery, men may use an electric razor 48 hours   prior to surgery. You may shower the night before surgery or the morning of   your surgery with an antibacterial soap. You will need to bring a photo ID and insurance card    [facility] has an onsite pharmacy, would you like to utilize our pharmacy     If you will be staying overnight and use a C-pap machine, please bring   your C-pap to hospital     Our goal is to provide you with excellent care, therefore, visitors will be limited to two(2) in the room at a time so that we may focus on providing this care for you. Please contact pre-admission testing if you have any further questions.                  [facility] phone number:  [phone] PAT fax number: [phone]  Please note these are generalized instructions for all surgical cases, you may be provided with more specific instructions according to your surgery. Pt will bring in COVID TEST from 10/30 with her DOS    Preoperative Screening for Elective Surgery/Invasive Procedures While COVID-19 present in the community     Have you tested positive or have been told to self-isolate for COVID-19 like symptoms within the past 28 days? n   Do you currently have any of the following symptoms? n  o Fever >100.0 F or 99.9 F in immunocompromised patients?n  o New onset cough, shortness of breath or difficulty breathing?n  o New onset sore throat, myalgia (muscle aches and pains), headache, loss of taste/smell or diarrhea?n   Have you had a potential exposure to COVID-19 within the past 14 days by:  o Close contact with a confirmed case?n  o Close contact with a healthcare worker,  or essential infrastructure worker (grocery store, [employer], gas station, public utilities or transportation)? y  o Do you reside in a congregate setting such as; skilled nursing facility, adult home, correctional facility, homeless shelter or other institutional setting?n  o Have you had recent travel to a known COVID-19 hotspot?n    Indicate if the patient has a positive screen by answering yes to one or more of the above questions. Patients who test positive or screen positive prior to surgery or on the day of surgery should be evaluated in conjunction with the surgeon/proceduralist/anesthesiologist to determine the urgency of the procedure. Pt was requested to have Rapid Covid test to be done prior to surgery/procedure. Understands that surgery/procedure maybe subjected to cancel if not completed prior to DOS and to bring copy of rapid testing in DOS. If positive, pt must contact surgeon immediately or with any other questions.

## 2021-11-01 NOTE — PROGRESS NOTES
Yordan's office contacted to fax order and h.p for DOS ASAP    PAT interview complete/chart turned in

## 2021-11-03 ENCOUNTER — ANESTHESIA EVENT (OUTPATIENT)
Dept: OPERATING ROOM | Age: 31
End: 2021-11-03
Payer: OTHER GOVERNMENT

## 2021-11-04 ENCOUNTER — HOSPITAL ENCOUNTER (OUTPATIENT)
Age: 31
Setting detail: OUTPATIENT SURGERY
Discharge: HOME | End: 2021-11-04
Attending: SURGERY | Admitting: SURGERY
Payer: OTHER GOVERNMENT

## 2021-11-04 ENCOUNTER — ANESTHESIA (OUTPATIENT)
Dept: OPERATING ROOM | Age: 31
End: 2021-11-04
Payer: OTHER GOVERNMENT

## 2021-11-04 VITALS
OXYGEN SATURATION: 98 % | TEMPERATURE: 97.7 F | DIASTOLIC BLOOD PRESSURE: 57 MMHG | SYSTOLIC BLOOD PRESSURE: 102 MMHG | RESPIRATION RATE: 10 BRPM

## 2021-11-04 VITALS
HEART RATE: 85 BPM | BODY MASS INDEX: 27.55 KG/M2 | SYSTOLIC BLOOD PRESSURE: 134 MMHG | OXYGEN SATURATION: 98 % | TEMPERATURE: 96 F | DIASTOLIC BLOOD PRESSURE: 78 MMHG | RESPIRATION RATE: 16 BRPM | HEIGHT: 64 IN | WEIGHT: 161.38 LBS

## 2021-11-04 DIAGNOSIS — R59.1 LYMPHADENOPATHY: ICD-10-CM

## 2021-11-04 DIAGNOSIS — C50.911 MALIGNANT NEOPLASM OF RIGHT FEMALE BREAST, UNSPECIFIED ESTROGEN RECEPTOR STATUS, UNSPECIFIED SITE OF BREAST (HCC): ICD-10-CM

## 2021-11-04 LAB
A/G RATIO: 1.5 (ref 1.1–2.2)
ALBUMIN SERPL-MCNC: 4.6 G/DL (ref 3.4–5)
ALP BLD-CCNC: 75 U/L (ref 40–129)
ALT SERPL-CCNC: 15 U/L (ref 10–40)
ANION GAP SERPL CALCULATED.3IONS-SCNC: 12 MMOL/L (ref 3–16)
AST SERPL-CCNC: 34 U/L (ref 15–37)
BILIRUB SERPL-MCNC: 0.5 MG/DL (ref 0–1)
BUN BLDV-MCNC: 12 MG/DL (ref 7–20)
CALCIUM SERPL-MCNC: 9.4 MG/DL (ref 8.3–10.6)
CHLORIDE BLD-SCNC: 101 MMOL/L (ref 99–110)
CO2: 24 MMOL/L (ref 21–32)
CREAT SERPL-MCNC: 0.7 MG/DL (ref 0.6–1.1)
GFR AFRICAN AMERICAN: >60
GFR NON-AFRICAN AMERICAN: >60
GLUCOSE BLD-MCNC: 106 MG/DL (ref 70–99)
GLUCOSE BLD-MCNC: 91 MG/DL (ref 70–99)
HCT VFR BLD CALC: 40 % (ref 36–48)
HEMOGLOBIN: 13.5 G/DL (ref 12–16)
MCH RBC QN AUTO: 29.8 PG (ref 26–34)
MCHC RBC AUTO-ENTMCNC: 33.8 G/DL (ref 31–36)
MCV RBC AUTO: 88 FL (ref 80–100)
PDW BLD-RTO: 13.2 % (ref 12.4–15.4)
PERFORMED ON: ABNORMAL
PLATELET # BLD: 283 K/UL (ref 135–450)
PMV BLD AUTO: 8.8 FL (ref 5–10.5)
POTASSIUM SERPL-SCNC: 5.5 MMOL/L (ref 3.5–5.1)
PREGNANCY, URINE: NEGATIVE
RBC # BLD: 4.55 M/UL (ref 4–5.2)
SODIUM BLD-SCNC: 137 MMOL/L (ref 136–145)
TOTAL PROTEIN: 7.7 G/DL (ref 6.4–8.2)
WBC # BLD: 6.5 K/UL (ref 4–11)

## 2021-11-04 PROCEDURE — 88305 TISSUE EXAM BY PATHOLOGIST: CPT

## 2021-11-04 PROCEDURE — C1729 CATH, DRAINAGE: HCPCS | Performed by: SURGERY

## 2021-11-04 PROCEDURE — 3700000000 HC ANESTHESIA ATTENDED CARE: Performed by: SURGERY

## 2021-11-04 PROCEDURE — 3700000001 HC ADD 15 MINUTES (ANESTHESIA): Performed by: SURGERY

## 2021-11-04 PROCEDURE — 3600000002 HC SURGERY LEVEL 2 BASE: Performed by: SURGERY

## 2021-11-04 PROCEDURE — 7100000001 HC PACU RECOVERY - ADDTL 15 MIN: Performed by: SURGERY

## 2021-11-04 PROCEDURE — 6360000002 HC RX W HCPCS: Performed by: SURGERY

## 2021-11-04 PROCEDURE — 7100000011 HC PHASE II RECOVERY - ADDTL 15 MIN: Performed by: SURGERY

## 2021-11-04 PROCEDURE — 2500000003 HC RX 250 WO HCPCS: Performed by: NURSE ANESTHETIST, CERTIFIED REGISTERED

## 2021-11-04 PROCEDURE — 7100000000 HC PACU RECOVERY - FIRST 15 MIN: Performed by: SURGERY

## 2021-11-04 PROCEDURE — 88360 TUMOR IMMUNOHISTOCHEM/MANUAL: CPT

## 2021-11-04 PROCEDURE — 2580000003 HC RX 258: Performed by: ANESTHESIOLOGY

## 2021-11-04 PROCEDURE — 7100000010 HC PHASE II RECOVERY - FIRST 15 MIN: Performed by: SURGERY

## 2021-11-04 PROCEDURE — 84703 CHORIONIC GONADOTROPIN ASSAY: CPT

## 2021-11-04 PROCEDURE — 6360000002 HC RX W HCPCS: Performed by: ANESTHESIOLOGY

## 2021-11-04 PROCEDURE — 6360000002 HC RX W HCPCS: Performed by: NURSE ANESTHETIST, CERTIFIED REGISTERED

## 2021-11-04 PROCEDURE — 3600000012 HC SURGERY LEVEL 2 ADDTL 15MIN: Performed by: SURGERY

## 2021-11-04 PROCEDURE — 80053 COMPREHEN METABOLIC PANEL: CPT

## 2021-11-04 PROCEDURE — 2580000003 HC RX 258: Performed by: SURGERY

## 2021-11-04 PROCEDURE — 2500000003 HC RX 250 WO HCPCS: Performed by: SURGERY

## 2021-11-04 PROCEDURE — 2709999900 HC NON-CHARGEABLE SUPPLY: Performed by: SURGERY

## 2021-11-04 PROCEDURE — 85027 COMPLETE CBC AUTOMATED: CPT

## 2021-11-04 PROCEDURE — 88331 PATH CONSLTJ SURG 1 BLK 1SPC: CPT

## 2021-11-04 RX ORDER — SODIUM CHLORIDE 9 MG/ML
25 INJECTION, SOLUTION INTRAVENOUS PRN
Status: DISCONTINUED | OUTPATIENT
Start: 2021-11-04 | End: 2021-11-04 | Stop reason: HOSPADM

## 2021-11-04 RX ORDER — MORPHINE SULFATE 2 MG/ML
2 INJECTION, SOLUTION INTRAMUSCULAR; INTRAVENOUS EVERY 5 MIN PRN
Status: DISCONTINUED | OUTPATIENT
Start: 2021-11-04 | End: 2021-11-04 | Stop reason: HOSPADM

## 2021-11-04 RX ORDER — SODIUM CHLORIDE 0.9 % (FLUSH) 0.9 %
10 SYRINGE (ML) INJECTION EVERY 12 HOURS SCHEDULED
Status: DISCONTINUED | OUTPATIENT
Start: 2021-11-04 | End: 2021-11-04 | Stop reason: HOSPADM

## 2021-11-04 RX ORDER — ONDANSETRON 4 MG/1
4 TABLET, FILM COATED ORAL EVERY 8 HOURS PRN
Qty: 2 TABLET | Refills: 1 | Status: SHIPPED | OUTPATIENT
Start: 2021-11-04 | End: 2022-03-21

## 2021-11-04 RX ORDER — MORPHINE SULFATE 2 MG/ML
1 INJECTION, SOLUTION INTRAMUSCULAR; INTRAVENOUS EVERY 5 MIN PRN
Status: DISCONTINUED | OUTPATIENT
Start: 2021-11-04 | End: 2021-11-04 | Stop reason: HOSPADM

## 2021-11-04 RX ORDER — LIDOCAINE HYDROCHLORIDE 20 MG/ML
INJECTION, SOLUTION EPIDURAL; INFILTRATION; INTRACAUDAL; PERINEURAL PRN
Status: DISCONTINUED | OUTPATIENT
Start: 2021-11-04 | End: 2021-11-04 | Stop reason: SDUPTHER

## 2021-11-04 RX ORDER — ONDANSETRON 2 MG/ML
4 INJECTION INTRAMUSCULAR; INTRAVENOUS
Status: DISCONTINUED | OUTPATIENT
Start: 2021-11-04 | End: 2021-11-04 | Stop reason: HOSPADM

## 2021-11-04 RX ORDER — HYDRALAZINE HYDROCHLORIDE 20 MG/ML
5 INJECTION INTRAMUSCULAR; INTRAVENOUS
Status: DISCONTINUED | OUTPATIENT
Start: 2021-11-04 | End: 2021-11-04 | Stop reason: HOSPADM

## 2021-11-04 RX ORDER — LIDOCAINE HYDROCHLORIDE 10 MG/ML
INJECTION, SOLUTION INFILTRATION; PERINEURAL
Status: COMPLETED | OUTPATIENT
Start: 2021-11-04 | End: 2021-11-04

## 2021-11-04 RX ORDER — MAGNESIUM HYDROXIDE 1200 MG/15ML
LIQUID ORAL CONTINUOUS PRN
Status: COMPLETED | OUTPATIENT
Start: 2021-11-04 | End: 2021-11-04

## 2021-11-04 RX ORDER — MEPERIDINE HYDROCHLORIDE 25 MG/ML
12.5 INJECTION INTRAMUSCULAR; INTRAVENOUS; SUBCUTANEOUS EVERY 5 MIN PRN
Status: DISCONTINUED | OUTPATIENT
Start: 2021-11-04 | End: 2021-11-04 | Stop reason: HOSPADM

## 2021-11-04 RX ORDER — LABETALOL HYDROCHLORIDE 5 MG/ML
5 INJECTION, SOLUTION INTRAVENOUS EVERY 10 MIN PRN
Status: DISCONTINUED | OUTPATIENT
Start: 2021-11-04 | End: 2021-11-04 | Stop reason: HOSPADM

## 2021-11-04 RX ORDER — FENTANYL CITRATE 50 UG/ML
INJECTION, SOLUTION INTRAMUSCULAR; INTRAVENOUS PRN
Status: DISCONTINUED | OUTPATIENT
Start: 2021-11-04 | End: 2021-11-04 | Stop reason: SDUPTHER

## 2021-11-04 RX ORDER — DEXAMETHASONE SODIUM PHOSPHATE 4 MG/ML
INJECTION, SOLUTION INTRA-ARTICULAR; INTRALESIONAL; INTRAMUSCULAR; INTRAVENOUS; SOFT TISSUE PRN
Status: DISCONTINUED | OUTPATIENT
Start: 2021-11-04 | End: 2021-11-04 | Stop reason: SDUPTHER

## 2021-11-04 RX ORDER — MIDAZOLAM HYDROCHLORIDE 1 MG/ML
INJECTION INTRAMUSCULAR; INTRAVENOUS PRN
Status: DISCONTINUED | OUTPATIENT
Start: 2021-11-04 | End: 2021-11-04 | Stop reason: SDUPTHER

## 2021-11-04 RX ORDER — SODIUM CHLORIDE 0.9 % (FLUSH) 0.9 %
10 SYRINGE (ML) INJECTION PRN
Status: DISCONTINUED | OUTPATIENT
Start: 2021-11-04 | End: 2021-11-04 | Stop reason: HOSPADM

## 2021-11-04 RX ORDER — PROPOFOL 10 MG/ML
INJECTION, EMULSION INTRAVENOUS PRN
Status: DISCONTINUED | OUTPATIENT
Start: 2021-11-04 | End: 2021-11-04 | Stop reason: SDUPTHER

## 2021-11-04 RX ORDER — SODIUM CHLORIDE 9 MG/ML
INJECTION, SOLUTION INTRAVENOUS CONTINUOUS
Status: DISCONTINUED | OUTPATIENT
Start: 2021-11-04 | End: 2021-11-04 | Stop reason: HOSPADM

## 2021-11-04 RX ORDER — OXYCODONE HYDROCHLORIDE AND ACETAMINOPHEN 5; 325 MG/1; MG/1
1 TABLET ORAL PRN
Status: DISCONTINUED | OUTPATIENT
Start: 2021-11-04 | End: 2021-11-04 | Stop reason: HOSPADM

## 2021-11-04 RX ORDER — ONDANSETRON 2 MG/ML
INJECTION INTRAMUSCULAR; INTRAVENOUS PRN
Status: DISCONTINUED | OUTPATIENT
Start: 2021-11-04 | End: 2021-11-04 | Stop reason: SDUPTHER

## 2021-11-04 RX ORDER — HYDROCODONE BITARTRATE AND ACETAMINOPHEN 5; 325 MG/1; MG/1
1 TABLET ORAL EVERY 6 HOURS PRN
Qty: 8 TABLET | Refills: 0 | Status: SHIPPED | OUTPATIENT
Start: 2021-11-04 | End: 2021-11-07

## 2021-11-04 RX ORDER — OXYCODONE HYDROCHLORIDE AND ACETAMINOPHEN 5; 325 MG/1; MG/1
2 TABLET ORAL PRN
Status: DISCONTINUED | OUTPATIENT
Start: 2021-11-04 | End: 2021-11-04 | Stop reason: HOSPADM

## 2021-11-04 RX ADMIN — HYDROMORPHONE HYDROCHLORIDE 0.25 MG: 1 INJECTION, SOLUTION INTRAMUSCULAR; INTRAVENOUS; SUBCUTANEOUS at 11:41

## 2021-11-04 RX ADMIN — FENTANYL CITRATE 100 MCG: 50 INJECTION INTRAMUSCULAR; INTRAVENOUS at 10:18

## 2021-11-04 RX ADMIN — CEFAZOLIN 2000 MG: 10 INJECTION, POWDER, FOR SOLUTION INTRAVENOUS at 10:10

## 2021-11-04 RX ADMIN — DEXAMETHASONE SODIUM PHOSPHATE 8 MG: 4 INJECTION, SOLUTION INTRAMUSCULAR; INTRAVENOUS at 10:13

## 2021-11-04 RX ADMIN — MIDAZOLAM 2 MG: 1 INJECTION INTRAMUSCULAR; INTRAVENOUS at 10:10

## 2021-11-04 RX ADMIN — HYDROMORPHONE HYDROCHLORIDE 0.25 MG: 1 INJECTION, SOLUTION INTRAMUSCULAR; INTRAVENOUS; SUBCUTANEOUS at 11:49

## 2021-11-04 RX ADMIN — HYDROMORPHONE HYDROCHLORIDE 1 MG: 1 INJECTION, SOLUTION INTRAMUSCULAR; INTRAVENOUS; SUBCUTANEOUS at 10:36

## 2021-11-04 RX ADMIN — SODIUM CHLORIDE: 9 INJECTION, SOLUTION INTRAVENOUS at 08:55

## 2021-11-04 RX ADMIN — ONDANSETRON 4 MG: 2 INJECTION INTRAMUSCULAR; INTRAVENOUS at 10:13

## 2021-11-04 RX ADMIN — LIDOCAINE HYDROCHLORIDE 100 MG: 20 INJECTION, SOLUTION EPIDURAL; INFILTRATION; INTRACAUDAL; PERINEURAL at 10:13

## 2021-11-04 RX ADMIN — HYDROMORPHONE HYDROCHLORIDE 0.5 MG: 1 INJECTION, SOLUTION INTRAMUSCULAR; INTRAVENOUS; SUBCUTANEOUS at 11:56

## 2021-11-04 RX ADMIN — PROPOFOL 200 MG: 10 INJECTION, EMULSION INTRAVENOUS at 10:13

## 2021-11-04 ASSESSMENT — PAIN DESCRIPTION - PAIN TYPE
TYPE: SURGICAL PAIN
TYPE: SURGICAL PAIN

## 2021-11-04 ASSESSMENT — PULMONARY FUNCTION TESTS
PIF_VALUE: 9
PIF_VALUE: 0
PIF_VALUE: 8
PIF_VALUE: 12
PIF_VALUE: 8
PIF_VALUE: 13
PIF_VALUE: 9
PIF_VALUE: 8
PIF_VALUE: 1
PIF_VALUE: 8
PIF_VALUE: 9
PIF_VALUE: 1
PIF_VALUE: 0
PIF_VALUE: 0
PIF_VALUE: 8
PIF_VALUE: 12
PIF_VALUE: 9
PIF_VALUE: 13
PIF_VALUE: 12
PIF_VALUE: 1
PIF_VALUE: 9
PIF_VALUE: 13
PIF_VALUE: 8
PIF_VALUE: 1
PIF_VALUE: 12
PIF_VALUE: 9
PIF_VALUE: 9
PIF_VALUE: 8
PIF_VALUE: 2
PIF_VALUE: 8
PIF_VALUE: 0
PIF_VALUE: 8
PIF_VALUE: 15
PIF_VALUE: 12
PIF_VALUE: 8
PIF_VALUE: 14
PIF_VALUE: 8
PIF_VALUE: 2
PIF_VALUE: 9
PIF_VALUE: 8
PIF_VALUE: 9
PIF_VALUE: 8
PIF_VALUE: 11
PIF_VALUE: 8
PIF_VALUE: 8
PIF_VALUE: 9

## 2021-11-04 ASSESSMENT — ENCOUNTER SYMPTOMS: SHORTNESS OF BREATH: 0

## 2021-11-04 ASSESSMENT — PAIN - FUNCTIONAL ASSESSMENT
PAIN_FUNCTIONAL_ASSESSMENT: 0-10
PAIN_FUNCTIONAL_ASSESSMENT: ACTIVITIES ARE NOT PREVENTED

## 2021-11-04 ASSESSMENT — PAIN DESCRIPTION - DESCRIPTORS
DESCRIPTORS: DISCOMFORT
DESCRIPTORS: DISCOMFORT

## 2021-11-04 ASSESSMENT — PAIN DESCRIPTION - FREQUENCY
FREQUENCY: CONTINUOUS
FREQUENCY: CONTINUOUS

## 2021-11-04 ASSESSMENT — PAIN DESCRIPTION - LOCATION
LOCATION: OTHER (COMMENT)
LOCATION: OTHER (COMMENT)

## 2021-11-04 ASSESSMENT — PAIN DESCRIPTION - ONSET
ONSET: ON-GOING
ONSET: ON-GOING

## 2021-11-04 ASSESSMENT — PAIN DESCRIPTION - PROGRESSION
CLINICAL_PROGRESSION: NOT CHANGED
CLINICAL_PROGRESSION: GRADUALLY IMPROVING

## 2021-11-04 ASSESSMENT — PAIN DESCRIPTION - ORIENTATION
ORIENTATION: RIGHT
ORIENTATION: RIGHT

## 2021-11-04 ASSESSMENT — PAIN SCALES - GENERAL
PAINLEVEL_OUTOF10: 3
PAINLEVEL_OUTOF10: 7
PAINLEVEL_OUTOF10: 3
PAINLEVEL_OUTOF10: 6
PAINLEVEL_OUTOF10: 6

## 2021-11-04 NOTE — BRIEF OP NOTE
Brief Postoperative Note    Name           : Bekah Johnson  YOB: 1990  MRN             : [id number]    Pre-operative Diagnosis: 1. Right axillary adenopathy  2, h/o right breast cancer    Post-operative Diagnosis: Same    Procedure: 1. Right deep axillary node biopsy    Anesthesia: General and Local    Surgeons/Assistants: Yordan    Estimated Blood Loss: less than 50     Complications: None    Specimens: Was Obtained: 1. Right axillary node  2.  Additional right axillary tissue    Findings: same as postop      Electronically signed by Leslie Ivory MD on 11/4/2021 at 10:53 AM

## 2021-11-04 NOTE — H&P
Past Medical History:   Diagnosis Date    Breast CA ([facility]     right breast    Depression     Prolonged emergence from general anesthesia     Thyroid disease     hypothyroid     Past Surgical History:   Procedure Laterality Date    BREAST ENHANCEMENT SURGERY Bilateral 3/2/2020    BILATERAL FIRST STAGE BREAST RECONSTRUCTION performed by Josiah Medrano MD at [address] Left 2020    INSERTION OF TISUUE EXPANDER LEFT SIDE CHEST WALL performed by Josiah Medrano MD at [address] Bilateral 2020    BILATERAL SECOND STAGE BREAST RECONSTRUCTION AND REMOVAL OF PORTACATH performed by Josiah Merdano MD at [address]      core bx     SECTION      CHOLECYSTECTOMY  2016    ENDOMETRIAL ABLATION      MASTECTOMY Bilateral 3/2/2020    BILATERAL SIMPLE MASTECTOMY, RIGHT SENTINEL NODE BIOPSY (BLUE DYE ONLY)/ performed by Salbador Verdugo MD at [address]      left tissue expander removed    PORT SURGERY N/A 10/16/2019    SINGLE LUMEN PORT-A-CATHETER INSERTION LEFT SUBCLAVIAN performed by Salbador Verdugo MD at [address]   Allergen Reactions    Adhesive Tape Hives and Itching     AND TEGADERM (BLISTERS)    Betadine [Povidone Iodine]      Blisters rash    No Known Allergies      Scheduled Meds:   sodium chloride flush  10 mL IntraVENous 2 times per day    ceFAZolin  2,000 mg IntraVENous Once     Continuous Infusions:   sodium chloride      sodium chloride       PRN Meds:.sodium chloride flush, sodium chloride  Vitals:    21 0833   BP: 117/72   Pulse: 85   Resp: 16   Temp: 97.7 °F (36.5 °C)   SpO2: 98%     Social History     Socioeconomic History    Marital status:      Spouse name: Not on file    Number of children: Not on file    Years of education: Not on file    Highest education level: Not on file   Occupational History    Not on file   Tobacco Use    Smoking status: Never Smoker    Smokeless tobacco: Never Used   Vaping Use    Vaping Use: Never used   Substance and Sexual Activity    Alcohol use: Yes     Comment: occasionally    Drug use: No    Sexual activity: Yes     Partners: Male   Other Topics Concern    Not on file   Social History Narrative    Not on file     Social Determinants of Health     Financial Resource Strain:     Difficulty of Paying Living Expenses:    Food Insecurity:     Worried About Running Out of Food in the Last Year:     [address] in the Last Year:    Transportation Needs:     Lack of Transportation (Medical):  Lack of Transportation (Non-Medical):    Physical Activity:     Days of Exercise per Week:     Minutes of Exercise per Session:    Stress:     Feeling of Stress :    Social Connections:     Frequency of Communication with Friends and Family:     Frequency of Social Gatherings with Friends and Family:     Attends Anab[employer]ism Services:     Active Member of Clubs or Organizations:     Attends Club or Organization Meetings:     Marital Status:    Intimate Partner Violence:     Fear of Current or Ex-Partner:     Emotionally Abused:     Physically Abused:     Sexually Abused:        Gen: WDWN female in NAD  CV: RRR  Lungs: CTA  Axilla: enlarged mobiler node right axilla    A/P Right axillary adenopathy: okay for surgery.     Anaya Dorado MD

## 2021-11-04 NOTE — PROGRESS NOTES
Discharge instructions given to patient and . Verbalize understanding. Scripts x 2 given. Wants to rest awhile.

## 2021-11-04 NOTE — ANESTHESIA PRE PROCEDURE
[facility] Department of Anesthesiology  Pre-Anesthesia Evaluation/Consultation       Name:  Emilio Chacon  : 1990  Age:  33 y.o.                                            MRN:  [id number]  Date: 2021           Surgeon: Surgeon(s):  Jaun Villagomez MD    Procedure: Procedure(s):  RIGHT AXILLARY NODE BIOPSY     Allergies   Allergen Reactions    Adhesive Tape Hives and Itching     AND TEGADERM (BLISTERS)    Betadine [Povidone Iodine]      Blisters rash    No Known Allergies      Patient Active Problem List   Diagnosis    H/O  section    Polyhydramnios    Symptomatic cholelithiasis     labor third trimester with  delivery third trimester    Umbilical hernia without obstruction and without gangrene    Malignant neoplasm of lower-outer quadrant of right breast of female, estrogen receptor positive ([address]    Lethargy    Acute ethmoidal sinusitis    Leukocytosis    Breast cancer of lower-outer quadrant of right female breast ([address]    Malignant neoplasm of right female breast ([address]    Status post bilateral mastectomy     Past Medical History:   Diagnosis Date    Breast CA ([address]     right breast    Depression     Prolonged emergence from general anesthesia     Thyroid disease     hypothyroid     Past Surgical History:   Procedure Laterality Date    BREAST ENHANCEMENT SURGERY Bilateral 3/2/2020    BILATERAL FIRST STAGE BREAST RECONSTRUCTION performed by Sarai Alvarez MD at [address] Left 2020    INSERTION OF TISUUE EXPANDER LEFT SIDE CHEST WALL performed by Sarai Alvarez MD at [address] Bilateral 2020    BILATERAL SECOND STAGE BREAST RECONSTRUCTION AND REMOVAL OF PORTACATH performed by Sarai Alvarez MD at [address]      core bx     SECTION      CHOLECYSTECTOMY  2016    ENDOMETRIAL ABLATION      MASTECTOMY Bilateral 3/2/2020    BILATERAL SIMPLE MASTECTOMY, RIGHT SENTINEL NODE BIOPSY (BLUE DYE ONLY)/ performed by Renetta Law MD at [place] [address]      left tissue expander removed    PORT SURGERY N/A 10/16/2019    SINGLE LUMEN PORT-A-CATHETER INSERTION LEFT SUBCLAVIAN performed by Renetta Law MD at [address] EXTRACTION       Social History     Tobacco Use    Smoking status: Never Smoker    Smokeless tobacco: Never Used   Vaping Use    Vaping Use: Never used   Substance Use Topics    Alcohol use: Yes     Comment: occasionally    Drug use: No     Medications  No current facility-administered medications on file prior to encounter.      Current Outpatient Medications on File Prior to Encounter   Medication Sig Dispense Refill    Multiple Vitamins-Minerals (THERAPEUTIC MULTIVITAMIN-MINERALS) tablet Take 1 tablet by mouth every evening      Probiotic Product (PROBIOTIC-10 PO) Take by mouth every evening      Cholecalciferol (VITAMIN D) 50 MCG (2000 UT) CAPS capsule Take 1,000 Units by mouth every evening      buPROPion (WELLBUTRIN XL) 150 MG extended release tablet Take 150 mg by mouth nightly      tamoxifen (NOLVADEX) 20 MG tablet Take 20 mg by mouth every evening        Current Facility-Administered Medications   Medication Dose Route Frequency Provider Last Rate Last Admin    0.9 % sodium chloride infusion   IntraVENous Continuous Cheko Parmar  mL/hr at 11/04/21 0855 New Bag at 11/04/21 0855    sodium chloride flush 0.9 % injection 10 mL  10 mL IntraVENous 2 times per day Cheko Parmar MD        sodium chloride flush 0.9 % injection 10 mL  10 mL IntraVENous PRN Cheko Parmar MD        0.9 % sodium chloride infusion  25 mL IntraVENous PRN Cheko Parmar MD        ceFAZolin (ANCEF) 2000 mg in dextrose 5 % 100 mL IVPB  2,000 mg IntraVENous Once Renetta Law MD         Vital Signs (Current)   Vitals:    11/01/21 0957 11/04/21 0833   BP:  117/72   Pulse:  85 Resp:  16   Temp:  97.7 °F (36.5 °C)   TempSrc:  Temporal   SpO2:  98%   Weight: 163 lb (73.9 kg) 161 lb 6 oz (73.2 kg)   Height: 5' 4\" (1.626 m) 5' 4\" (1.626 m)                                            Vital Signs Statistics (for past 48 hrs)     Temp  Av.7 °F (36.5 °C)  Min: 97.7 °F (36.5 °C)   Min taken time: 21  Max: 97.7 °F (36.5 °C)   Max taken time: 21  Pulse  Av  Min: 85   Min taken time: 21  Max: 85   Max taken time: 21  Resp  Av  Min: 16   Min taken time: 21  Max: 16   Max taken time: 21  BP  Min: 117/72   Min taken time: 21  Max: 117/72   Max taken time: 21  SpO2  Av %  Min: 98 %   Min taken time: 21  Max: 98 %   Max taken time: 21  BP Readings from Last 3 Encounters:   21 117/72   20 112/66   20 125/82       BMI  Body mass index is 27.7 kg/m². Estimated body mass index is 27.7 kg/m² as calculated from the following:    Height as of this encounter: 5' 4\" (1.626 m). Weight as of this encounter: 161 lb 6 oz (73.2 kg).     CBC   Lab Results   Component Value Date    WBC 6.5 2021    RBC 4.55 2021    HGB 13.5 2021    HCT 40.0 2021    MCV 88.0 2021    RDW 13.2 2021     2021     CMP    Lab Results   Component Value Date     2021    K 5.5 2021    K 4.2 2019     2021    CO2 24 2021    BUN 12 2021    CREATININE 0.7 2021    GFRAA >60 2021    AGRATIO 1.5 2021    LABGLOM >60 2021    GLUCOSE 91 2021    PROT 7.7 2021    CALCIUM 9.4 2021    BILITOT 0.5 2021    ALKPHOS 75 2021    AST 34 2021    ALT 15 2021     BMP    Lab Results   Component Value Date     2021    K 5.5 2021    K 4.2 2019     2021    CO2 24 2021    BUN 12 2021    CREATININE 0.7 2021 CALCIUM 9.4 11/04/2021    GFRAA >60 11/04/2021    LABGLOM >60 11/04/2021    GLUCOSE 91 11/04/2021     POCGlucose  Recent Labs     11/04/21  0847   GLUCOSE 91      Coags  No results found for: PROTIME, INR, APTT  HCG (If Applicable)   Lab Results   Component Value Date    PREGTESTUR Negative 11/04/2021      ABGs No results found for: PHART, PO2ART, OSY0JMA, QWR2SXW, BEART, G1WPMFBH   Type & Screen (If Applicable)  No results found for: LABABO, LABRH                         BMI: Wt Readings from Last 3 Encounters:       NPO Status:   Date of last liquid consumption: 11/03/21   Time of last liquid consumption: 2100   Date of last solid food consumption: 11/03/21      Time of last solid consumption: 2100       Anesthesia Evaluation  Patient summary reviewed and Nursing notes reviewed  Airway: Mallampati: II        Dental:          Pulmonary:       (-) COPD, asthma and shortness of breath                           Cardiovascular:        (-) hypertension, valvular problems/murmurs, past MI, CAD, CABG/stent, dysrhythmias,  angina and no hyperlipidemia                Neuro/Psych:   (+) psychiatric history:depression/anxiety    (-) seizures, TIA and CVA           GI/Hepatic/Renal:        (-) GERD, PUD, liver disease and no renal disease       Endo/Other:    (+) malignancy/cancer. (-) diabetes mellitus               Abdominal:             Vascular: negative vascular ROS. Other Findings:             Anesthesia Plan      general     ASA 2     (I discussed with the patient the risks and benefits of PIV, general anesthesia, IV Narcotics, PACU. All questions were answered the patient agrees with the plan.)  Induction: intravenous. Anesthetic plan and risks discussed with patient. Plan discussed with CRNA. This pre-anesthesia assessment may be used as a history and physical.    DOS STAFF ADDENDUM:    Pt seen and examined, chart reviewed (including anesthesia, drug and allergy history).   No interval changes to history and physical examination. Anesthetic plan, risks, benefits, alternatives, and personnel involved discussed with patient. Patient verbalized an understanding and agrees to proceed.       Debrah Schlatter, MD  November 4, 2021  9:34 AM

## 2021-11-04 NOTE — PROGRESS NOTES
Admitted to PACU 11 from OR, pt sedate with oral airway intact, VSS on 3L O2 NC. Report recd from anesthesia.

## 2021-11-04 NOTE — PROGRESS NOTES
Received from PACU. Admitted to Phase 2 care. Awake and alert, respirations easy and even. Oriented to room and surroundings. States pain is \"ok\" now.

## 2021-11-04 NOTE — PROGRESS NOTES
Patient dressed and sitting in recliner,  bringing car around, patient refused to let me do an additional set of vital signs.

## 2021-11-04 NOTE — H&P
Date of Surgery Update:  Christina Concepcion was seen, history and physical examination reviewed, and patient examined by me today. There have been no significant clinical changes since the completion of the previous history and physical. The surgical site was confirmed by the patient and me. I have presented reasonable alternatives to the patient's proposed care, treatment, and services. The discussion I have done encompassed risks, benefits, and side effects related to the alternatives and the risks related to not receiving the proposed care, treatment, and services. All questions answered. Patient wishes to proceed.      Electronically signed by: Kia Beaulieu MD,11/4/2021,8:52 AM

## 2021-11-04 NOTE — ANESTHESIA POSTPROCEDURE EVALUATION
Department of Anesthesiology  Postprocedure Note    Patient: Christina Concepcion  MRN: [id number]  YOB: 1990  Date of evaluation: 11/4/2021  Time:  12:00 PM     Procedure Summary     Date: 11/04/21 Room / Location:  [facility] [address] [place] [address] [facility]    Anesthesia Start: 1010 Anesthesia Stop: 7507    Procedure: RIGHT AXILLARY NODE BIOPSY (Right Axilla) Diagnosis:       Lymphadenopathy      Malignant neoplasm of right female breast, unspecified estrogen receptor status, unspecified site of breast ([address]      (RIGHT BREAST CANCER, LYMPHADENOPATHY)    Surgeons: Kia Beaulieu MD Responsible Provider: Nathan Lay MD    Anesthesia Type: general ASA Status: 2          Anesthesia Type: general    Cliff Phase I: Cliff Score: 10    Cliff Phase II:      Last vitals: Reviewed and per EMR flowsheets.        Anesthesia Post Evaluation    Patient location during evaluation: PACU  Level of consciousness: awake and alert  Airway patency: patent  Nausea & Vomiting: no nausea and no vomiting  Complications: no  Cardiovascular status: blood pressure returned to baseline  Respiratory status: acceptable  Hydration status: euvolemic  Comments: Postoperative Anesthesia Note    Name:    Christina Concepcion  MRN:      [id number]    Patient Vitals in the past 12 hrs:  11/04/21 1145, BP:127/82, Pulse:79, Resp:11, SpO2:98 %  11/04/21 1135, SpO2:100 %  11/04/21 1130, BP:129/83, Pulse:87, Resp:11, SpO2:99 %  11/04/21 1125, BP:131/86, Pulse:101, Resp:12, SpO2:100 %  11/04/21 1120, BP:129/84, Pulse:88, Resp:12  11/04/21 1115, BP:127/84, Pulse:87, Resp:10, SpO2:100 %  11/04/21 1110, BP:124/76, Pulse:81, Resp:11, SpO2:98 %  11/04/21 1107, BP:121/76, Temp:96.4 °F (35.8 °C), Temp src:Temporal, Pulse:85, Resp:12, SpO2:96 %  11/04/21 0833, BP:117/72, Temp:97.7 °F (36.5 °C), Temp src:Temporal, Pulse:85, Resp:16, SpO2:98 %, Height:5' 4\" (1.626 m), Weight:161 lb 6 oz (73.2 kg)     LABS:    CBC  Lab Results       Component Value               Date/Time                  WBC                      6.5                 11/04/2021 08:47 AM        HGB                      13.5                11/04/2021 08:47 AM        HCT                      40.0                11/04/2021 08:47 AM        PLT                      283                 11/04/2021 08:47 AM   RENAL  Lab Results       Component                Value               Date/Time                  NA                       137                 11/04/2021 08:47 AM        K                        5.5 (H)             11/04/2021 08:47 AM        K                        4.2                 11/24/2019 01:06 PM        CL                       101                 11/04/2021 08:47 AM        CO2                      24                  11/04/2021 08:47 AM        BUN                      12                  11/04/2021 08:47 AM        CREATININE               0.7                 11/04/2021 08:47 AM        GLUCOSE                  91                  11/04/2021 08:47 AM   COAGS  No results found for: PROTIME, INR, APTT    Intake & Output:  @24HRIO@    Nausea & Vomiting:  No    Level of Consciousness:  Awake    Pain Assessment:  Adequate analgesia    Anesthesia Complications:  No apparent anesthetic complications    SUMMARY      Vital signs stable  OK to discharge from Stage I post anesthesia care.   Care transferred from Anesthesiology department on discharge from perioperative area

## 2021-11-08 ENCOUNTER — HOSPITAL ENCOUNTER (OUTPATIENT)
Dept: PET IMAGING | Age: 31
Discharge: HOME | End: 2021-11-08
Payer: OTHER GOVERNMENT

## 2021-11-08 VITALS — BODY MASS INDEX: 27.31 KG/M2 | HEIGHT: 64 IN | WEIGHT: 160 LBS

## 2021-11-08 DIAGNOSIS — C50.311 MALIGNANT NEOPLASM OF LOWER-INNER QUADRANT OF RIGHT FEMALE BREAST, UNSPECIFIED ESTROGEN RECEPTOR STATUS (HCC): ICD-10-CM

## 2021-11-08 PROCEDURE — 3430000000 HC RX DIAGNOSTIC RADIOPHARMACEUTICAL: Performed by: INTERNAL MEDICINE

## 2021-11-08 PROCEDURE — 78815 PET IMAGE W/CT SKULL-THIGH: CPT

## 2021-11-08 PROCEDURE — A9552 F18 FDG: HCPCS | Performed by: INTERNAL MEDICINE

## 2021-11-08 RX ORDER — FLUDEOXYGLUCOSE F 18 200 MCI/ML
13.08 INJECTION, SOLUTION INTRAVENOUS
Status: COMPLETED | OUTPATIENT
Start: 2021-11-08 | End: 2021-11-08

## 2021-11-08 RX ADMIN — FLUDEOXYGLUCOSE F 18 13.08 MILLICURIE: 200 INJECTION, SOLUTION INTRAVENOUS at 08:25

## 2021-11-09 NOTE — OP NOTE
[address] [place] [address] [facility]                                OPERATIVE REPORT    PATIENT NAME: Sharonda Martel               :        1990  MED REC NO:   [id number]                          ROOM:  ACCOUNT NO:   [de-identified]                           ADMIT DATE: 2021  PROVIDER:     Jim Lopes MD      DATE OF PROCEDURE:  2021    PREOPERATIVE DIAGNOSES:  1. Right breast cancer, lower outer quadrant. 2.  Right axillary lymphadenopathy. 3.  Status post neoadjuvant chemotherapy. POSTOPERATIVE DIAGNOSES:  1. Right breast cancer, lower outer quadrant. 2.  Right axillary lymphadenopathy. 3.  Status post neoadjuvant chemotherapy. PROCEDURE PERFORMED:  Right deep axillary lymph node dissection. SURGEON:  Jim Lopes MD    ESTIMATED BLOOD LOSS:  Less than 50 mL. ANESTHESIA:  General plus 30 mL 1% Xylocaine. SPECIMENS:  1. Right axillary lymph node. 2.  Right additional axillary tissue. INDICATIONS FOR PROCEDURE:  The patient is a 47-year-old female who in  2019 was found to have a large mass encompassing the majority of the  lower outer aspect of her right breast.  She underwent a core biopsy of  this lesion which revealed a grade 3 infiltrating ductal carcinoma. She  underwent neoadjuvant chemotherapy and had a substantial shrinkage of  this tumor. She has since undergone mastectomy. She has been on  tamoxifen therapy. She began noticing some pain in the right axilla and  a CT scan showed a slightly enlarged lymph node. An ultrasound-guided  FNA of this revealed benign lymphocyte. She had a recent followup CT  which showed stability in the lesion. She was recently seen in Dr. Juanito Espinoza office and was found to have a palpable mass in the right  axilla. Ultrasound showed an irregular lymph node approximately to 2.5  cm in size. She presents today for excision.     DESCRIPTION OF PROCEDURE:  The patient was brought to the operating  room, placed on the OR table in supine position. Following the  induction of general anesthesia, the patient's right axilla was prepped  with Betadine and draped in the usual sterile manner. A curvilinear  incision was made in the upper axilla over the edge of the pectoralis  major muscle. Dissection was carried down through subcutaneous tissue  and through the clavipectoral fascia. A firm palpable node was noted  along the chest wall, posterior to the pectoralis major and minor  muscle. This was high up in the level two area of axillary nodes. Blunt and sharp dissection was performed to tease the medial aspect of  this lymph node from the chest wall. Dissection was then carried along  the inferior aspect to free this up and allow for this to be elevated  from the field. Eventually sharp dissection was performed and while  grasping this mass, it was evident that the mass needed to be transected  to allow for visualization along the superior aspect of the mass. The  mass was closely approximating the medial aspect of the axillary vein,  and until a portion of this was removed, it was unable to safely  continue dissection. This portion of the mass was removed and submitted  to Pathology for frozen section and proved to be a malignant lymph node. Upon examining this, it appeared that there may have been two to three  lymph nodes sitting within this area. The remainder of the firm node  was then grasped and blunt and sharp dissection was used to finish  removing it from the medial and superior aspect of the specimen. The  lateral aspect of the specimen was easily freed from the surrounding  breast tissue. Vascular and lymphatic channels were clipped as they  were encountered. It did appear that the thoracodorsal nerve and vein  were clipped in order to allow for removal of this lesion.   Upon  removing the entirety of this mass, further palpation of

## 2021-11-26 NOTE — PLAN OF CARE
[facility], [facility]  [address] [facility], [facility]  Phone: [phone]   Fax:     [phone]                                                       Physical Therapy Certification    Dear Referring Practitioner: Melissa Russell MD,    We had the pleasure of evaluating the following patient for physical therapy services at [facility]. A summary of our findings can be found in the initial assessment below. This includes our plan of care. If you have any questions or concerns regarding these findings, please do not hesitate to contact me at the office phone number checked above. Thank you for the referral.       Physician Signature:_______________________________Date:__________________  By signing above (or electronic signature), therapists plan is approved by physician              Patient: Brendon Berkowitz   : 1990   MRN: [id number]  Referring Physician: Referring Practitioner: Melissa Russell MD      Evaluation Date: 2021      Medical Diagnosis Information:  Diagnosis: I89.0 (ICD-10-CM) - Lymphedema, not elsewhere classified                                             Insurance information:       Precautions/ Contra-indications: none ntoed  Latex Allergy:  [x]NO      []YES  Preferred Language for Healthcare:   [x]English       []other:    C-SSRS Triggered by Intake questionnaire (Past 2 wk assessment ):   [x] No, Questionnaire did not trigger screening.   [] Yes, Patient intake triggered C-SSRS Screening      [] C-SSRS Screening completed  [] PCP notified via Epic     SUBJECTIVE:    The patient is a 70-year-old female who in 2019 was found to have a large mass encompassing the majority of the  lower outer aspect of her right breast.  She underwent a core biopsy of this lesion which revealed a grade 3 infiltrating ductal carcinoma.   She underwent neoadjuvant chemotherapy and had a substantial shrinkage of this tumor. She has since undergone mastectomy. She has been on tamoxifen therapy. She began noticing some pain in the right axilla and a CT scan showed a slightly enlarged lymph node. CT scan showed a slightly enlarged lymph node. An ultrasound-guided FNA of this revealed benign lymphocyte. To OR 11/4 for Right deep axillary lymph node dissection. Per pt three nodes were cancerous.         Relevant Medical History:Additional Pertinent Hx: Right breast CA bilateral mastectomy and reconstruction,thyroid disease, depression,  Functional Scale/Score:      Pain Scale: 0/10  Easing factors: non  Provocative factors: none     Type: []Constant   []Intermittent  []Radiating []Localized []other:     Numbness/Tingling: nnone    Occupation/School: MA at [employer]    Living Status/Prior Level of Function: Independent with ADLs and IADLs,     OBJECTIVE:   Posture:    Functional Mobility/TransfersGAIT:       ROM LEFT RIGHT Comments   UE        Shoulder flex wfl     Shoulder AB wfl     Shoulder ER wfl wfl    Shoulder IR wfl wfl              Strength / Myotomes Left Right    Shoulder Flex wfl all wfl all    Shoulder Abduction (C5)      Shoulder ER      Shoulder IR        Pitting   [x] none [] slightly [] moderate [] severe [] brawny (does not indent)   Color    [] dusky [] mottled [] red streaks [] other:  Skin Texture   [] rough  [] dry   [] moist  [x] normal  [] hyperkaratosis [] hyperplasia  [] hyperpigmentation [] Elephantiasis  [] papillomas  [] Skin breakdown with lymphorrhea (weeping)  Skin Temperature   [x] normal [] cool  [] uneven [] warm [] hot  Edema Rebound*   [] quick [] slow [] fibrotic tissue  *pressure applied x10 seconds    Signs of Constriction:  Condition of Nailbeds:   [] discolored [] red  [] white [] swollen     Skin Breakdown (indicate size, location and number) [] Yes [x] No  Comments:  Fistulas (an abnormal passageway) [] Yes  [x] No  Comments:  Tinea (fungus) [] Yes [x] No  Comments:  Papilloma (benign tumor arising from an epithelial layer)  [] Yes [x] No  Comments:   Fibrotic areas [] Yes [x] No  Comments:   Lymphorrhea (flow of lymph from a cut or ruptured lymph vessels): [] Yes [x] No  Comments:  Warts (a local growth of the outer layer of skin) [] Yes [x] No  Comments:  Ulcers  [] Yes [x] No  Comments:    SCARS:  Right axillary scar    STEMMER SIGN: [] positive [x] negative    Stage of Lymphedema   [x] Latency stage/Lymphangiopathy (Stage 0 / Prestage / Subclinical stage):   · No swelling  · Reduced transport capacity (TC)  · \"Normal\" tissue consistency  [] Stage 1 (reversible stage):  · Edema is soft (pitting)  · No secondary tissue changes  · Elevation reduces swelling  [] Stage 2 (spontaneously irreversible stage)  · Lyphostatic fibrosis  · Hardening of the tissue (no pitting)  · Stemmer sign positive   · Frequent infections   [] Stage 3 (lymphostatic elephantiasis)  · Extreme increase in volume and tissue texture with typical skin changes (papillomas, deep skinfolds, etc.)  Stemmer sign positive    GIRTH MEASUREMENT(Tape on skin along anterior arm)  Upper Extremity Right (cm) Left  (cm)   11/30          axilla 34.5 cm 34   elbow 25 25   wrist 16 16              +.5 cm      [x] Patient history, allergies, meds reviewed. Medical chart reviewed. See intake form. Review Of Systems (ROS):  [x]Performed Review of systems (Integumentary, CardioPulmonary, Neurological) by intake and observation. Intake form has been scanned into medical record. Patient has been instructed to contact their primary care physician regarding ROS issues if not already being addressed at this time.       Co-morbidities/Complexities (which will affect course of rehabilitation):   [x]None           Arthritic conditions   []Rheumatoid arthritis (M05.9)  []Osteoarthritis (M19.91)   Cardiovascular conditions   []Hypertension (I10)  []Hyperlipidemia with:  [x] no personal factors and/or comorbidities that impact the plan of care;  []1-2 personal factors and/or comorbidities that impact the plan of care  []3 personal factors and/or comorbidities that impact the plan of care  [x] An examination of body systems using standardized tests and measures addressing any of the following: body structures and functions (impairments), activity limitations, and/or participation restrictions;:  [x] a total of 1-2 or more elements   [] a total of 3 or more elements   [] a total of 4 or more elements   [x] A clinical presentation with:  [x] stable and/or uncomplicated characteristics   [] evolving clinical presentation with changing characteristics  [] unstable and unpredictable characteristics;   [x] Clinical decision making of [x] low, [] moderate, [] high complexity using standardized patient assessment instrument and/or measurable assessment of functional outcome. [x] EVAL (LOW) 25439 (typically 15 minutes face-to-face)  [] EVAL (MOD) 52934 (typically 30 minutes face-to-face)  [] EVAL (HIGH) 06579 (typically 45 minutes face-to-face)  [] RE-EVAL     PLAN:  manual lymph drainage to ; compression, HEP, education on lymphedema management, ROM/strength exercises to restore PLOF    Frequency/Duration:  1-2xdays per week for  Weeks:  Interventions:  [x]  Compression to include multilayer compression bandaging and/or compression garments as appropriate  [x]  Manual therapy as indicated for RUE to include: manual lymph drainage, STM, ROM as appropriate. [x]  Modalities as needed that may include: lymphedema pump, thermal agents, as indicated  [x]  Patient education on lymphedema management, compression, activity modification, progression of HEP.   [x]  Therapeutic exercise including: strength/ROM/flexibility  [x]  NMR activation and proprioception  including postural re-education  [x]  Aquatic exercise including: strength/ROM/flexibility/balance      HEP instruction: Written HEP instructions provided and reviewed:    GOALS:  Patient stated goal: to avoid lymphedema  [] Progressing: [] Met: [] Not Met: [] Adjusted    Therapist goals for Patient:   Short Term Goals: To be achieved in: 2 weeks  1. Independent in HEP and progression per patient tolerance, in order to prevent return of swelling   [] Progressing: [] Met: [] Not Met: [] Adjusted  2. Patient will have a decrease in swelling/pain to facilitate improvement in movement, function, and ADLs as indicated by improvement with LLIS. [] Progressing: [] Met: [] Not Met: [] Adjusted    Long Term Goals: To be achieved in: 1-2 weeks  1. Disability index score of 0% or less on the LLIS to assist with reaching prior level of function. [] Progressing: [] Met: [] Not Met: [] Adjusted  2. Patient will demonstrate increased AROM/strength of RUE to [facility] so that pt can resume normal ADL without increase in symptoms. [] Progressing: [] Met: [] Not Met: [] Adjusted  3. Decrease swelling of RUE by  so that pt can return to functional activities including  without increased symptoms or restriction. (NA)  [] Progressing: [] Met: [] Not Met: [] Adjusted      Electronically signed by:  Carmel Hamlin, PT  [id number]    Note: If patient does not return for scheduled/recommended follow up visits, this note will serve as a discharge from care along with the most recent update on progress.

## 2021-11-30 ENCOUNTER — HOSPITAL ENCOUNTER (OUTPATIENT)
Dept: PHYSICAL THERAPY | Age: 31
Setting detail: THERAPIES SERIES
Discharge: HOME | End: 2021-11-30
Payer: OTHER GOVERNMENT

## 2021-11-30 PROCEDURE — 97530 THERAPEUTIC ACTIVITIES: CPT

## 2021-11-30 PROCEDURE — 97110 THERAPEUTIC EXERCISES: CPT

## 2021-11-30 PROCEDURE — 97161 PT EVAL LOW COMPLEX 20 MIN: CPT

## 2021-12-07 NOTE — FLOWSHEET NOTE
[facility], [facility]  [address] [place] [address] [place] [address]  Phone: [phone]   Fax:     [phone]      Physical Therapy Treatment Note/ Progress Report:     Date:  2021    Patient Name:  Tenzin Ybarra    :  1990  MRN: [id number]    Pertinent Medical History:Additional Pertinent Hx: Right breast CA bilateral mastectomy and reconstruction,thyroid disease, depression,    Medical/Treatment Diagnosis Information:  · Diagnosis: I89.0 (ICD-10-CM) - Lymphedema, not elsewhere classified    Decreased functional mobility post right axillary dissection    Insurance/Certification information:   [facility]  Physician Information:  Referring Practitioner: Shabbir Wei MD  Plan of care signed (Y/N): sent    Date of Patient follow up with Physician:      Progress Report: []  Yes  [x]  No     Date Range for reporting period:  Beginnin21  Ending:      Progress report due (10 Rx/or 30 days whichever is less):      Recertification due (POC duration/ or 90 days whichever is less):     Visit # POC/Insurance Allowable Auth Needed   1 ? Latex Allergy:  [x]NO      []YES  Preferred Language for Healthcare:   [x]English       []Other:     SUBJECTIVE:    The patient is a 68-year-old female who in 2019 was found to have a large mass encompassing the majority of the  lower outer aspect of her right breast.  She underwent a core biopsy of this lesion which revealed a grade 3 infiltrating ductal carcinoma.  She underwent neoadjuvant chemotherapy and had a substantial shrinkage of this tumor.  She has since undergone mastectomy. Aileen Lopez has been on tamoxifen therapy.  She began noticing some pain in the right axilla and a CT scan showed a slightly enlarged lymph node.   CT scan showed a slightly enlarged lymph node.  An ultrasound-guided FNA of this revealed benign lymphocyte.   To OR 11/4 for Right deep axillary lymph node dissection. Per pt three nodes were cancerous.      Relevant Medical History:Additional Pertinent Hx: Right breast CA bilateral mastectomy and reconstruction,thyroid disease, depression,  Functional Scale/Score:       Pain Scale: 0/10  Easing factors: non  Provocative factors: none     ROM - left wfl              Right - flexion  165, abduction  170, IR/ER wfl              Strength  WFL      GIRTH MEASUREMENT(Tape on skin along anterior arm)  Upper Extremity Right (cm) Left  (cm)   11/30               axilla 34.5 cm 34   elbow 25 25   wrist 16 16                     +.5 cm        ROM exercises for RUE - , wrist ROM flexion and extension,  Elbow flexion and extension,  Shoulder shrugs, shoulder overhead press. FOR RUE ROM - supine cane flexion x 10                                 Flexion stretch on door jamb x 10 sec x 6  Written HEP for exercises, lymphedema precautions and prevention, use of compression sleeve, clearing techniques. Instructed in gentle axillary scar massage to prevent adhesions. Reviewed side effects of radiation in terms of tissue mobility - Pt expresses understanding of techniques and treatment plan    RESTRICTIONS/PRECAUTIONS: lymphedema precautions    Exercises/Interventions:   Manual Intervention (90344)  Min:    CLEAR NODES    Neck X 10   Axilla                                  X 10   Abdomen X 10   Groin X 10           CLEAR ALT PATHWAY 10x ea    Re-clear alt path, 5x each position    # hand positions        FLUID MOBILIZATION    Re-clear alt path,5x each position  X 10   location    # hand positions          Other Therapeutic Activities: Pt was educated on PT POC, Diagnosis, Prognosis, pathomechanics as well as frequency and duration of scheduling future physical therapy appointments. Time was also taken on this day to answer all patient questions and participation in PT.  Reviewed appointment policy in detail with patient and patient verbalized understanding. Home Exercise Program: Patient was instructed in the following for HEP:     . Patient verbalized/demonstrated understanding and was issued written handout. Therapeutic Exercise and NMR EXR  [x] (87819) Provided verbal/tactile cueing for activities related to strengthening, flexibility, endurance, ROM for improvements in LE, proximal hip, and core control with self care, mobility, lifting, ambulation.  [] (21526) Provided verbal/tactile cueing for activities related to improving balance, coordination, kinesthetic sense, posture, motor skill, proprioception  to assist with LE, proximal hip, and core control in self care, mobility, lifting, ambulation and eccentric single leg control. NMR and Therapeutic Activities:    [x] (18515 or 70358) Provided verbal/tactile cueing for activities related to improving balance, coordination, kinesthetic sense, posture, motor skill, proprioception and motor activation to allow for proper function of core, proximal hip and LE with self care and ADLs and functional mobility.    [x] (48103) Gait Re-education- Provided training and instruction to the patient for proper LE, core and proximal hip recruitment and positioning and eccentric body weight control with ambulation re-education including up and down stairs     Home Exercise Program:    [x] (74266) Reviewed/Progressed HEP activities related to strengthening, flexibility, endurance, ROM of core, proximal hip and LE for functional self-care, mobility, lifting and ambulation/stair navigation   [] (33017)Reviewed/Progressed HEP activities related to improving balance, coordination, kinesthetic sense, posture, motor skill, proprioception of core, proximal hip and LE for self care, mobility, lifting, and ambulation/stair navigation      Manual Treatments:  PROM / STM / Oscillations-Mobs:  G-I, II, III, IV (PA's, Inf., Post.)  [x] (59770) Provided manual therapy to mobilize LE, proximal hip and/or LS spine soft function. []? Progressing: []? Met: []? Not Met: []? Adjusted  2. Patient will demonstrate increased AROM/strength of RUE to [facility] so that pt can resume normal ADL without increase in symptoms. []? Progressing: []? Met: []? Not Met: []? Adjusted  3. Decrease swelling of RUE by  so that pt can return to functional activities including  without increased symptoms or restriction. (NA)  []? Progressing: []? Met: []? Not Met: []? Adjus    Treatment/Activity Tolerance:  [x] Patient tolerated treatment well [] Patient limited by fatique  [] Patient limited by pain  [] Patient limited by other medical complications  [] Other:     Overall Progression Towards Functional goals/ Treatment Progress Update:  [] Patient is progressing as expected towards functional goals listed. [] Progression is slowed due to complexities/Impairments listed. [] Progression has been slowed due to co-morbidities. [x] Plan just implemented, too soon to assess goals progression <30days   [] Goals require adjustment due to lack of progress  [] Patient is not progressing as expected and requires additional follow up with physician  [] Other    Prognosis for POC: [x] Good [] Fair  [] Poor    Patient requires continued skilled intervention: [x] Yes  [] No        PLAN: eval and DC Pt instructed to call if any issues arise in the future. [] Continue per plan of care [] Alter current plan (see comments)  [x] Plan of care initiated [] Hold pending MD visit [] Discharge    Electronically signed by: Juris Homans, PT [id number]    Note: If patient does not return for scheduled/recommended follow up visits, this note will serve as a discharge from care along with the most recent update on progress.

## 2022-01-17 NOTE — PROGRESS NOTES
Patient has not had pre-op with Dr. Rylie Nichols yet [facility]. She will fax them over after patient comes in for pre-op appointment.

## 2022-01-17 NOTE — PROGRESS NOTES
[address] [facility] time____0600________        Surgery time_______0730_____    Take the following medications with a sip of water:    Do not eat or drink anything after 12:00 midnight prior to your surgery. This includes water chewing gum, mints and ice chips. You may brush your teeth and gargle the morning of your surgery, but do not swallow the water     Please see your family doctor/pediatrician for a history and physical and/or concerning medications. Bring any test results/reports from your physicians office. If you are under the care of a heart doctor or specialist doctor, please be aware that you may be asked to them for clearance    You may be asked to stop blood thinners such as Coumadin, Plavix, Fragmin, Lovenox, etc., or any anti-inflammatories such as:  Aspirin, Ibuprofen, Advil, Naproxen prior to your surgery. We also ask that you stop any OTC medications such as fish oil, vitamin E, glucosamine, garlic, Multivitamins, COQ 10, etc.    We ask that you do not smoke 24 hours prior to surgery  We ask that you do not  drink any alcoholic beverages 24 hours prior to surgery     You must make arrangements for a responsible adult to take you home after your surgery. For your safety you will not be allowed to leave alone or drive yourself home. Your surgery will be cancelled if you do not have a ride home. Also for your safety, it is strongly suggested that someone stay with you the first 24 hours after your surgery. A parent or legal guardian must accompany a child scheduled for surgery and plan to stay at the hospital until the child is discharged. Please do not bring other children with you. For your comfort, please wear simple loose fitting clothing to the hospital.  Please do not bring valuables.     Do not wear any make-up or nail polish on your fingers or toes      For your safety, please do not wear any jewelry or body piercing's on the day of surgery. All jewelry must be removed. If you have dentures, they will be removed before going to operating room. For your convenience, we will provide you with a container. If you wear contact lenses or glasses, they will be removed, please bring a case for them. If you have a living will and a durable power of  for healthcare, please bring in a copy. As part of our patient safety program to minimize surgical site infections, we ask you to do the following:    · Please notify your surgeon if you develop any illness between         now and the  day of your surgery. · This includes a cough, cold, fever, sore throat, nausea,         or vomiting, and diarrhea, etc.  ·  Please notify your surgeon if you experience dizziness, shortness         of breath or blurred vision between now and the time of your surgery. Do not shave your operative site 96 hours prior to surgery. For face and neck surgery, men may use an electric razor 48 hours   prior to surgery. You may shower the night before surgery or the morning of   your surgery with an antibacterial soap. You will need to bring a photo ID and insurance card    [facility] has an onsite pharmacy, would you like to utilize our pharmacy     If you will be staying overnight and use a C-pap machine, please bring   your C-pap to hospital     Our goal is to provide you with excellent care, therefore, visitors will be limited to two(2) in the room at a time so that we may focus on providing this care for you. Please contact pre-admission testing if you have any further questions. [facility] phone number:  [phone] [facility] fax number:  [phone]  Please note these are generalized instructions for all surgical cases, you may be provided with more specific instructions according to your surgery. Unfortunately due the rise in covid cases, staffing crisis and hospital capacity, your procedure may need to be rescheduled--if this were to happen your Surgeons office will notify you ACovid testing to be done @  If positive---Pt instructed to notify MD ASAP   If negative--pt was instructed to bring results DOP/DOSCovid testing to be done @  If positive---Pt instructed to notify MD ASAP   If negative--pt was instructed to bring results DOP/DOSPreoperative Screening for Elective Surgery/Invasive Procedures While COVID-19 present in the community     1. Have you tested positive or have been told to self-isolate for COVID-19 like symptoms within the past 28 days?no  2. Do you currently have any of the following symptoms?no  ? Fever >100.0 F or 99.9 F in immunocompromised patients? ? New onset cough, shortness of breath or difficulty breathing? ? New onset sore throat, myalgia (muscle aches and pains), headache, loss of taste/smell or diarrhea? 3. Have you had a potential exposure to COVID-19 within the past 14 days by:no  ? Close contact with a confirmed case? ? Close contact with a healthcare worker,  or essential infrastructure worker (grocery store, [employer], gas station, public utilities or transportation)?no  ? Do you reside in a congregate setting such as; skilled nursing facility, adult home, correctional facility, homeless shelter or other institutional setting? ? Have you had recent travel to a known COVID-19 hotspot? Indicate if the patient has a positive screen by answering yes to one or more of the above questions. SAP

## 2022-01-24 ENCOUNTER — ANESTHESIA EVENT (OUTPATIENT)
Dept: OPERATING ROOM | Age: 32
End: 2022-01-24
Payer: OTHER GOVERNMENT

## 2022-01-24 NOTE — PROGRESS NOTES
Office notes from Dora in [employer] for [employer] orders placed in pharmacy bin and placed in [employer] for [employer]

## 2022-01-25 ENCOUNTER — ANESTHESIA (OUTPATIENT)
Dept: OPERATING ROOM | Age: 32
End: 2022-01-25
Payer: OTHER GOVERNMENT

## 2022-01-25 ENCOUNTER — HOSPITAL ENCOUNTER (OUTPATIENT)
Age: 32
Setting detail: OBSERVATION
Discharge: HOME | End: 2022-01-26
Attending: OBSTETRICS & GYNECOLOGY | Admitting: OBSTETRICS & GYNECOLOGY
Payer: OTHER GOVERNMENT

## 2022-01-25 VITALS
DIASTOLIC BLOOD PRESSURE: 63 MMHG | RESPIRATION RATE: 20 BRPM | OXYGEN SATURATION: 97 % | SYSTOLIC BLOOD PRESSURE: 106 MMHG | TEMPERATURE: 98.6 F

## 2022-01-25 DIAGNOSIS — G89.18 POSTOPERATIVE PAIN: Primary | ICD-10-CM

## 2022-01-25 DIAGNOSIS — Z85.3 PERSONAL HISTORY OF BREAST CANCER: ICD-10-CM

## 2022-01-25 DIAGNOSIS — C50.912 MALIGNANT NEOPLASM OF LEFT FEMALE BREAST, UNSPECIFIED ESTROGEN RECEPTOR STATUS, UNSPECIFIED SITE OF BREAST (HCC): ICD-10-CM

## 2022-01-25 DIAGNOSIS — L76.82 OTHER POSTPROCEDURAL COMPLICATIONS OF SKIN AND SUBCUTANEOUS TISSUE: ICD-10-CM

## 2022-01-25 LAB — PREGNANCY, URINE: NEGATIVE

## 2022-01-25 PROCEDURE — 2580000003 HC RX 258: Performed by: OBSTETRICS & GYNECOLOGY

## 2022-01-25 PROCEDURE — 6360000002 HC RX W HCPCS: Performed by: ANESTHESIOLOGY

## 2022-01-25 PROCEDURE — 2500000003 HC RX 250 WO HCPCS: Performed by: NURSE ANESTHETIST, CERTIFIED REGISTERED

## 2022-01-25 PROCEDURE — 2580000003 HC RX 258: Performed by: ANESTHESIOLOGY

## 2022-01-25 PROCEDURE — G0378 HOSPITAL OBSERVATION PER HR: HCPCS

## 2022-01-25 PROCEDURE — 88305 TISSUE EXAM BY PATHOLOGIST: CPT

## 2022-01-25 PROCEDURE — S2900 ROBOTIC SURGICAL SYSTEM: HCPCS | Performed by: OBSTETRICS & GYNECOLOGY

## 2022-01-25 PROCEDURE — 7100000001 HC PACU RECOVERY - ADDTL 15 MIN: Performed by: OBSTETRICS & GYNECOLOGY

## 2022-01-25 PROCEDURE — 3600000009 HC SURGERY ROBOT BASE: Performed by: OBSTETRICS & GYNECOLOGY

## 2022-01-25 PROCEDURE — 6360000002 HC RX W HCPCS: Performed by: NURSE ANESTHETIST, CERTIFIED REGISTERED

## 2022-01-25 PROCEDURE — 88307 TISSUE EXAM BY PATHOLOGIST: CPT

## 2022-01-25 PROCEDURE — 3600000019 HC SURGERY ROBOT ADDTL 15MIN: Performed by: OBSTETRICS & GYNECOLOGY

## 2022-01-25 PROCEDURE — 7100000010 HC PHASE II RECOVERY - FIRST 15 MIN: Performed by: OBSTETRICS & GYNECOLOGY

## 2022-01-25 PROCEDURE — 84703 CHORIONIC GONADOTROPIN ASSAY: CPT

## 2022-01-25 PROCEDURE — 6370000000 HC RX 637 (ALT 250 FOR IP): Performed by: ANESTHESIOLOGY

## 2022-01-25 PROCEDURE — 3700000001 HC ADD 15 MINUTES (ANESTHESIA): Performed by: OBSTETRICS & GYNECOLOGY

## 2022-01-25 PROCEDURE — 6360000002 HC RX W HCPCS: Performed by: OBSTETRICS & GYNECOLOGY

## 2022-01-25 PROCEDURE — 2709999900 HC NON-CHARGEABLE SUPPLY: Performed by: OBSTETRICS & GYNECOLOGY

## 2022-01-25 PROCEDURE — 2720000010 HC SURG SUPPLY STERILE: Performed by: OBSTETRICS & GYNECOLOGY

## 2022-01-25 PROCEDURE — 6370000000 HC RX 637 (ALT 250 FOR IP): Performed by: OBSTETRICS & GYNECOLOGY

## 2022-01-25 PROCEDURE — 7100000000 HC PACU RECOVERY - FIRST 15 MIN: Performed by: OBSTETRICS & GYNECOLOGY

## 2022-01-25 PROCEDURE — 7100000011 HC PHASE II RECOVERY - ADDTL 15 MIN: Performed by: OBSTETRICS & GYNECOLOGY

## 2022-01-25 PROCEDURE — A4217 STERILE WATER/SALINE, 500 ML: HCPCS | Performed by: OBSTETRICS & GYNECOLOGY

## 2022-01-25 PROCEDURE — 3700000000 HC ANESTHESIA ATTENDED CARE: Performed by: OBSTETRICS & GYNECOLOGY

## 2022-01-25 RX ORDER — FENTANYL CITRATE 50 UG/ML
INJECTION, SOLUTION INTRAMUSCULAR; INTRAVENOUS PRN
Status: DISCONTINUED | OUTPATIENT
Start: 2022-01-25 | End: 2022-01-25 | Stop reason: SDUPTHER

## 2022-01-25 RX ORDER — ACETAMINOPHEN 325 MG/1
650 TABLET ORAL EVERY 4 HOURS PRN
Status: DISCONTINUED | OUTPATIENT
Start: 2022-01-25 | End: 2022-01-26 | Stop reason: HOSPADM

## 2022-01-25 RX ORDER — APREPITANT 40 MG/1
40 CAPSULE ORAL ONCE
Status: COMPLETED | OUTPATIENT
Start: 2022-01-25 | End: 2022-01-25

## 2022-01-25 RX ORDER — MORPHINE SULFATE 2 MG/ML
2 INJECTION, SOLUTION INTRAMUSCULAR; INTRAVENOUS EVERY 5 MIN PRN
Status: DISCONTINUED | OUTPATIENT
Start: 2022-01-25 | End: 2022-01-25 | Stop reason: HOSPADM

## 2022-01-25 RX ORDER — ONDANSETRON 2 MG/ML
4 INJECTION INTRAMUSCULAR; INTRAVENOUS EVERY 6 HOURS PRN
Status: DISCONTINUED | OUTPATIENT
Start: 2022-01-25 | End: 2022-01-26 | Stop reason: HOSPADM

## 2022-01-25 RX ORDER — MEPERIDINE HYDROCHLORIDE 25 MG/ML
12.5 INJECTION INTRAMUSCULAR; INTRAVENOUS; SUBCUTANEOUS EVERY 5 MIN PRN
Status: DISCONTINUED | OUTPATIENT
Start: 2022-01-25 | End: 2022-01-25 | Stop reason: HOSPADM

## 2022-01-25 RX ORDER — MIDAZOLAM HYDROCHLORIDE 1 MG/ML
INJECTION INTRAMUSCULAR; INTRAVENOUS PRN
Status: DISCONTINUED | OUTPATIENT
Start: 2022-01-25 | End: 2022-01-25 | Stop reason: SDUPTHER

## 2022-01-25 RX ORDER — SODIUM CHLORIDE 9 MG/ML
25 INJECTION, SOLUTION INTRAVENOUS PRN
Status: DISCONTINUED | OUTPATIENT
Start: 2022-01-25 | End: 2022-01-25 | Stop reason: HOSPADM

## 2022-01-25 RX ORDER — CIPROFLOXACIN 250 MG/1
250 TABLET, FILM COATED ORAL 2 TIMES DAILY
Qty: 6 TABLET | Refills: 0 | Status: SHIPPED | OUTPATIENT
Start: 2022-01-25 | End: 2022-01-28

## 2022-01-25 RX ORDER — ONDANSETRON 2 MG/ML
INJECTION INTRAMUSCULAR; INTRAVENOUS PRN
Status: DISCONTINUED | OUTPATIENT
Start: 2022-01-25 | End: 2022-01-25 | Stop reason: SDUPTHER

## 2022-01-25 RX ORDER — MORPHINE SULFATE 2 MG/ML
2 INJECTION, SOLUTION INTRAMUSCULAR; INTRAVENOUS
Status: DISCONTINUED | OUTPATIENT
Start: 2022-01-25 | End: 2022-01-26 | Stop reason: HOSPADM

## 2022-01-25 RX ORDER — PROPOFOL 10 MG/ML
INJECTION, EMULSION INTRAVENOUS PRN
Status: DISCONTINUED | OUTPATIENT
Start: 2022-01-25 | End: 2022-01-25 | Stop reason: SDUPTHER

## 2022-01-25 RX ORDER — LIDOCAINE HYDROCHLORIDE 20 MG/ML
INJECTION, SOLUTION EPIDURAL; INFILTRATION; INTRACAUDAL; PERINEURAL PRN
Status: DISCONTINUED | OUTPATIENT
Start: 2022-01-25 | End: 2022-01-25 | Stop reason: SDUPTHER

## 2022-01-25 RX ORDER — HYDRALAZINE HYDROCHLORIDE 20 MG/ML
5 INJECTION INTRAMUSCULAR; INTRAVENOUS
Status: DISCONTINUED | OUTPATIENT
Start: 2022-01-25 | End: 2022-01-25 | Stop reason: HOSPADM

## 2022-01-25 RX ORDER — ROCURONIUM BROMIDE 10 MG/ML
INJECTION, SOLUTION INTRAVENOUS PRN
Status: DISCONTINUED | OUTPATIENT
Start: 2022-01-25 | End: 2022-01-25 | Stop reason: SDUPTHER

## 2022-01-25 RX ORDER — SODIUM CHLORIDE 0.9 % (FLUSH) 0.9 %
5-40 SYRINGE (ML) INJECTION PRN
Status: DISCONTINUED | OUTPATIENT
Start: 2022-01-25 | End: 2022-01-26 | Stop reason: HOSPADM

## 2022-01-25 RX ORDER — OXYCODONE HYDROCHLORIDE AND ACETAMINOPHEN 5; 325 MG/1; MG/1
1 TABLET ORAL PRN
Status: COMPLETED | OUTPATIENT
Start: 2022-01-25 | End: 2022-01-25

## 2022-01-25 RX ORDER — OXYCODONE HYDROCHLORIDE AND ACETAMINOPHEN 5; 325 MG/1; MG/1
2 TABLET ORAL PRN
Status: COMPLETED | OUTPATIENT
Start: 2022-01-25 | End: 2022-01-25

## 2022-01-25 RX ORDER — IBUPROFEN 400 MG/1
400 TABLET ORAL EVERY 6 HOURS PRN
Status: DISCONTINUED | OUTPATIENT
Start: 2022-01-27 | End: 2022-01-26

## 2022-01-25 RX ORDER — SODIUM CHLORIDE 9 MG/ML
25 INJECTION, SOLUTION INTRAVENOUS PRN
Status: DISCONTINUED | OUTPATIENT
Start: 2022-01-25 | End: 2022-01-26 | Stop reason: HOSPADM

## 2022-01-25 RX ORDER — IBUPROFEN 400 MG/1
400 TABLET ORAL EVERY 6 HOURS PRN
Status: DISCONTINUED | OUTPATIENT
Start: 2022-01-25 | End: 2022-01-25

## 2022-01-25 RX ORDER — SCOLOPAMINE TRANSDERMAL SYSTEM 1 MG/1
1 PATCH, EXTENDED RELEASE TRANSDERMAL
Status: DISCONTINUED | OUTPATIENT
Start: 2022-01-25 | End: 2022-01-25 | Stop reason: HOSPADM

## 2022-01-25 RX ORDER — BUPROPION HYDROCHLORIDE 150 MG/1
150 TABLET ORAL DAILY
Status: DISCONTINUED | OUTPATIENT
Start: 2022-01-25 | End: 2022-01-26 | Stop reason: HOSPADM

## 2022-01-25 RX ORDER — SODIUM CHLORIDE 9 MG/ML
INJECTION, SOLUTION INTRAVENOUS CONTINUOUS
Status: DISCONTINUED | OUTPATIENT
Start: 2022-01-25 | End: 2022-01-26 | Stop reason: HOSPADM

## 2022-01-25 RX ORDER — MORPHINE SULFATE 2 MG/ML
1 INJECTION, SOLUTION INTRAMUSCULAR; INTRAVENOUS EVERY 5 MIN PRN
Status: DISCONTINUED | OUTPATIENT
Start: 2022-01-25 | End: 2022-01-25 | Stop reason: HOSPADM

## 2022-01-25 RX ORDER — KETOROLAC TROMETHAMINE 30 MG/ML
30 INJECTION, SOLUTION INTRAMUSCULAR; INTRAVENOUS EVERY 6 HOURS
Status: DISCONTINUED | OUTPATIENT
Start: 2022-01-25 | End: 2022-01-26

## 2022-01-25 RX ORDER — ONDANSETRON 2 MG/ML
4 INJECTION INTRAMUSCULAR; INTRAVENOUS
Status: DISCONTINUED | OUTPATIENT
Start: 2022-01-25 | End: 2022-01-25 | Stop reason: HOSPADM

## 2022-01-25 RX ORDER — SODIUM CHLORIDE 0.9 % (FLUSH) 0.9 %
5-40 SYRINGE (ML) INJECTION PRN
Status: DISCONTINUED | OUTPATIENT
Start: 2022-01-25 | End: 2022-01-25 | Stop reason: HOSPADM

## 2022-01-25 RX ORDER — ONDANSETRON 4 MG/1
4 TABLET, ORALLY DISINTEGRATING ORAL EVERY 8 HOURS PRN
Status: DISCONTINUED | OUTPATIENT
Start: 2022-01-25 | End: 2022-01-26 | Stop reason: HOSPADM

## 2022-01-25 RX ORDER — SODIUM CHLORIDE 0.9 % (FLUSH) 0.9 %
5-40 SYRINGE (ML) INJECTION EVERY 12 HOURS SCHEDULED
Status: DISCONTINUED | OUTPATIENT
Start: 2022-01-25 | End: 2022-01-26 | Stop reason: HOSPADM

## 2022-01-25 RX ORDER — OXYCODONE HYDROCHLORIDE AND ACETAMINOPHEN 5; 325 MG/1; MG/1
1 TABLET ORAL EVERY 4 HOURS PRN
Status: DISCONTINUED | OUTPATIENT
Start: 2022-01-25 | End: 2022-01-26

## 2022-01-25 RX ORDER — MAGNESIUM HYDROXIDE 1200 MG/15ML
LIQUID ORAL CONTINUOUS PRN
Status: COMPLETED | OUTPATIENT
Start: 2022-01-25 | End: 2022-01-25

## 2022-01-25 RX ORDER — SODIUM CHLORIDE 0.9 % (FLUSH) 0.9 %
5-40 SYRINGE (ML) INJECTION EVERY 12 HOURS SCHEDULED
Status: DISCONTINUED | OUTPATIENT
Start: 2022-01-25 | End: 2022-01-25 | Stop reason: HOSPADM

## 2022-01-25 RX ORDER — DEXAMETHASONE SODIUM PHOSPHATE 4 MG/ML
INJECTION, SOLUTION INTRA-ARTICULAR; INTRALESIONAL; INTRAMUSCULAR; INTRAVENOUS; SOFT TISSUE PRN
Status: DISCONTINUED | OUTPATIENT
Start: 2022-01-25 | End: 2022-01-25 | Stop reason: SDUPTHER

## 2022-01-25 RX ORDER — LABETALOL HYDROCHLORIDE 5 MG/ML
5 INJECTION, SOLUTION INTRAVENOUS EVERY 10 MIN PRN
Status: DISCONTINUED | OUTPATIENT
Start: 2022-01-25 | End: 2022-01-25 | Stop reason: HOSPADM

## 2022-01-25 RX ADMIN — FENTANYL CITRATE 50 MCG: 50 INJECTION INTRAMUSCULAR; INTRAVENOUS at 07:54

## 2022-01-25 RX ADMIN — CEFOXITIN SODIUM 2000 MG: 2 POWDER, FOR SOLUTION INTRAVENOUS at 07:40

## 2022-01-25 RX ADMIN — FAMOTIDINE 20 MG: 10 INJECTION, SOLUTION INTRAVENOUS at 07:39

## 2022-01-25 RX ADMIN — ONDANSETRON 4 MG: 2 INJECTION INTRAMUSCULAR; INTRAVENOUS at 07:54

## 2022-01-25 RX ADMIN — PROPOFOL 200 MG: 10 INJECTION, EMULSION INTRAVENOUS at 07:47

## 2022-01-25 RX ADMIN — DEXAMETHASONE SODIUM PHOSPHATE 8 MG: 4 INJECTION, SOLUTION INTRAMUSCULAR; INTRAVENOUS at 07:54

## 2022-01-25 RX ADMIN — OXYCODONE HYDROCHLORIDE AND ACETAMINOPHEN 1 TABLET: 5; 325 TABLET ORAL at 21:41

## 2022-01-25 RX ADMIN — OXYCODONE HYDROCHLORIDE AND ACETAMINOPHEN 2 TABLET: 5; 325 TABLET ORAL at 13:30

## 2022-01-25 RX ADMIN — KETOROLAC TROMETHAMINE 30 MG: 30 INJECTION, SOLUTION INTRAMUSCULAR at 21:41

## 2022-01-25 RX ADMIN — OXYCODONE HYDROCHLORIDE AND ACETAMINOPHEN 1 TABLET: 5; 325 TABLET ORAL at 18:00

## 2022-01-25 RX ADMIN — ROCURONIUM BROMIDE 30 MG: 10 SOLUTION INTRAVENOUS at 07:47

## 2022-01-25 RX ADMIN — HYDROMORPHONE HYDROCHLORIDE 0.5 MG: 1 INJECTION, SOLUTION INTRAMUSCULAR; INTRAVENOUS; SUBCUTANEOUS at 12:23

## 2022-01-25 RX ADMIN — LIDOCAINE HYDROCHLORIDE 100 MG: 20 INJECTION, SOLUTION EPIDURAL; INFILTRATION; INTRACAUDAL; PERINEURAL at 07:47

## 2022-01-25 RX ADMIN — CEFOXITIN SODIUM 2000 MG: 2 POWDER, FOR SOLUTION INTRAVENOUS at 17:25

## 2022-01-25 RX ADMIN — ROCURONIUM BROMIDE 20 MG: 10 SOLUTION INTRAVENOUS at 07:54

## 2022-01-25 RX ADMIN — APREPITANT 40 MG: 40 CAPSULE ORAL at 07:15

## 2022-01-25 RX ADMIN — KETOROLAC TROMETHAMINE 30 MG: 30 INJECTION, SOLUTION INTRAMUSCULAR at 15:51

## 2022-01-25 RX ADMIN — MIDAZOLAM 1 MG: 1 INJECTION INTRAMUSCULAR; INTRAVENOUS at 07:39

## 2022-01-25 RX ADMIN — SODIUM CHLORIDE: 9 INJECTION, SOLUTION INTRAVENOUS at 07:43

## 2022-01-25 RX ADMIN — FENTANYL CITRATE 50 MCG: 50 INJECTION INTRAMUSCULAR; INTRAVENOUS at 07:53

## 2022-01-25 RX ADMIN — BUPROPION HYDROCHLORIDE 150 MG: 150 TABLET, EXTENDED RELEASE ORAL at 21:42

## 2022-01-25 RX ADMIN — SODIUM CHLORIDE: 9 INJECTION, SOLUTION INTRAVENOUS at 10:54

## 2022-01-25 RX ADMIN — SODIUM CHLORIDE: 9 INJECTION, SOLUTION INTRAVENOUS at 15:46

## 2022-01-25 RX ADMIN — HYDROMORPHONE HYDROCHLORIDE 0.5 MG: 1 INJECTION, SOLUTION INTRAMUSCULAR; INTRAVENOUS; SUBCUTANEOUS at 11:23

## 2022-01-25 ASSESSMENT — PAIN DESCRIPTION - PAIN TYPE
TYPE: SURGICAL PAIN

## 2022-01-25 ASSESSMENT — PULMONARY FUNCTION TESTS
PIF_VALUE: 27
PIF_VALUE: 27
PIF_VALUE: 14
PIF_VALUE: 27
PIF_VALUE: 27
PIF_VALUE: 1
PIF_VALUE: 15
PIF_VALUE: 26
PIF_VALUE: 27
PIF_VALUE: 14
PIF_VALUE: 15
PIF_VALUE: 20
PIF_VALUE: 27
PIF_VALUE: 15
PIF_VALUE: 27
PIF_VALUE: 17
PIF_VALUE: 22
PIF_VALUE: 27
PIF_VALUE: 25
PIF_VALUE: 27
PIF_VALUE: 15
PIF_VALUE: 27
PIF_VALUE: 14
PIF_VALUE: 26
PIF_VALUE: 27
PIF_VALUE: 27
PIF_VALUE: 15
PIF_VALUE: 27
PIF_VALUE: 17
PIF_VALUE: 27
PIF_VALUE: 23
PIF_VALUE: 18
PIF_VALUE: 27
PIF_VALUE: 0
PIF_VALUE: 27
PIF_VALUE: 15
PIF_VALUE: 14
PIF_VALUE: 26
PIF_VALUE: 23
PIF_VALUE: 21
PIF_VALUE: 27
PIF_VALUE: 21
PIF_VALUE: 27
PIF_VALUE: 19
PIF_VALUE: 27
PIF_VALUE: 1
PIF_VALUE: 17
PIF_VALUE: 14
PIF_VALUE: 27
PIF_VALUE: 17
PIF_VALUE: 27
PIF_VALUE: 26
PIF_VALUE: 23
PIF_VALUE: 27
PIF_VALUE: 26
PIF_VALUE: 14
PIF_VALUE: 27
PIF_VALUE: 20
PIF_VALUE: 27
PIF_VALUE: 26
PIF_VALUE: 27
PIF_VALUE: 27
PIF_VALUE: 16
PIF_VALUE: 26
PIF_VALUE: 15
PIF_VALUE: 26
PIF_VALUE: 12
PIF_VALUE: 27
PIF_VALUE: 20
PIF_VALUE: 26
PIF_VALUE: 27
PIF_VALUE: 24
PIF_VALUE: 27
PIF_VALUE: 27
PIF_VALUE: 3
PIF_VALUE: 27
PIF_VALUE: 26
PIF_VALUE: 26
PIF_VALUE: 27
PIF_VALUE: 27
PIF_VALUE: 18
PIF_VALUE: 27
PIF_VALUE: 18
PIF_VALUE: 27
PIF_VALUE: 3
PIF_VALUE: 16
PIF_VALUE: 27
PIF_VALUE: 27
PIF_VALUE: 26
PIF_VALUE: 20
PIF_VALUE: 27
PIF_VALUE: 27
PIF_VALUE: 14
PIF_VALUE: 0
PIF_VALUE: 27
PIF_VALUE: 27
PIF_VALUE: 14
PIF_VALUE: 27
PIF_VALUE: 16
PIF_VALUE: 27
PIF_VALUE: 21
PIF_VALUE: 27
PIF_VALUE: 0
PIF_VALUE: 27
PIF_VALUE: 27
PIF_VALUE: 21
PIF_VALUE: 27
PIF_VALUE: 26
PIF_VALUE: 27
PIF_VALUE: 27
PIF_VALUE: 12
PIF_VALUE: 27
PIF_VALUE: 12
PIF_VALUE: 27
PIF_VALUE: 27
PIF_VALUE: 26
PIF_VALUE: 27
PIF_VALUE: 26
PIF_VALUE: 27
PIF_VALUE: 26
PIF_VALUE: 26
PIF_VALUE: 27
PIF_VALUE: 18
PIF_VALUE: 27
PIF_VALUE: 21
PIF_VALUE: 27
PIF_VALUE: 14
PIF_VALUE: 27
PIF_VALUE: 15
PIF_VALUE: 27
PIF_VALUE: 4
PIF_VALUE: 14
PIF_VALUE: 24
PIF_VALUE: 27
PIF_VALUE: 27
PIF_VALUE: 3
PIF_VALUE: 23
PIF_VALUE: 27
PIF_VALUE: 26
PIF_VALUE: 26
PIF_VALUE: 18
PIF_VALUE: 26
PIF_VALUE: 26
PIF_VALUE: 18
PIF_VALUE: 26
PIF_VALUE: 14
PIF_VALUE: 17
PIF_VALUE: 26
PIF_VALUE: 14
PIF_VALUE: 27
PIF_VALUE: 14
PIF_VALUE: 27
PIF_VALUE: 27
PIF_VALUE: 15
PIF_VALUE: 27
PIF_VALUE: 27
PIF_VALUE: 17
PIF_VALUE: 14
PIF_VALUE: 15
PIF_VALUE: 27
PIF_VALUE: 27
PIF_VALUE: 28
PIF_VALUE: 27
PIF_VALUE: 27
PIF_VALUE: 1
PIF_VALUE: 3

## 2022-01-25 ASSESSMENT — ENCOUNTER SYMPTOMS: SHORTNESS OF BREATH: 0

## 2022-01-25 ASSESSMENT — PAIN DESCRIPTION - DESCRIPTORS
DESCRIPTORS: CRAMPING
DESCRIPTORS: CRAMPING;SHARP
DESCRIPTORS: CRAMPING
DESCRIPTORS: CRAMPING
DESCRIPTORS: DISCOMFORT
DESCRIPTORS: CRAMPING
DESCRIPTORS: CRAMPING;SHARP
DESCRIPTORS: DISCOMFORT

## 2022-01-25 ASSESSMENT — PAIN DESCRIPTION - ORIENTATION
ORIENTATION: LOWER;MID
ORIENTATION: LOWER;MID

## 2022-01-25 ASSESSMENT — PAIN SCALES - GENERAL
PAINLEVEL_OUTOF10: 7
PAINLEVEL_OUTOF10: 8
PAINLEVEL_OUTOF10: 7
PAINLEVEL_OUTOF10: 6
PAINLEVEL_OUTOF10: 6
PAINLEVEL_OUTOF10: 8
PAINLEVEL_OUTOF10: 3
PAINLEVEL_OUTOF10: 8
PAINLEVEL_OUTOF10: 4

## 2022-01-25 ASSESSMENT — PAIN DESCRIPTION - LOCATION
LOCATION: ABDOMEN

## 2022-01-25 ASSESSMENT — PAIN DESCRIPTION - FREQUENCY
FREQUENCY: CONTINUOUS

## 2022-01-25 ASSESSMENT — PAIN - FUNCTIONAL ASSESSMENT
PAIN_FUNCTIONAL_ASSESSMENT: PREVENTS OR INTERFERES WITH ALL ACTIVE AND SOME PASSIVE ACTIVITIES
PAIN_FUNCTIONAL_ASSESSMENT: PREVENTS OR INTERFERES WITH ALL ACTIVE AND SOME PASSIVE ACTIVITIES
PAIN_FUNCTIONAL_ASSESSMENT: ACTIVITIES ARE NOT PREVENTED
PAIN_FUNCTIONAL_ASSESSMENT: ACTIVITIES ARE NOT PREVENTED
PAIN_FUNCTIONAL_ASSESSMENT: PREVENTS OR INTERFERES WITH ALL ACTIVE AND SOME PASSIVE ACTIVITIES
PAIN_FUNCTIONAL_ASSESSMENT: ACTIVITIES ARE NOT PREVENTED
PAIN_FUNCTIONAL_ASSESSMENT: PREVENTS OR INTERFERES WITH ALL ACTIVE AND SOME PASSIVE ACTIVITIES
PAIN_FUNCTIONAL_ASSESSMENT: 0-10
PAIN_FUNCTIONAL_ASSESSMENT: PREVENTS OR INTERFERES SOME ACTIVE ACTIVITIES AND ADLS

## 2022-01-25 ASSESSMENT — PAIN DESCRIPTION - PROGRESSION
CLINICAL_PROGRESSION: NOT CHANGED
CLINICAL_PROGRESSION: NOT CHANGED
CLINICAL_PROGRESSION: GRADUALLY WORSENING
CLINICAL_PROGRESSION: GRADUALLY IMPROVING
CLINICAL_PROGRESSION: GRADUALLY IMPROVING
CLINICAL_PROGRESSION: NOT CHANGED
CLINICAL_PROGRESSION: NOT CHANGED
CLINICAL_PROGRESSION: GRADUALLY IMPROVING

## 2022-01-25 ASSESSMENT — PAIN DESCRIPTION - ONSET
ONSET: ON-GOING

## 2022-01-25 NOTE — ANESTHESIA PRE PROCEDURE
[facility] Department of Anesthesiology  Pre-Anesthesia Evaluation/Consultation       Name:  Tamra Fitzgerald  : 1990  Age:  33 y.o.                                            MRN:  [id number]  Date: 2022           Surgeon: Surgeon(s):  MD Sharon Velazquez MD    Procedure: Procedure(s):  DAVINCI TOTAL LAPAROSCOPIC HYSTERECTOMY, BILATERAL SALPINGO OOPHORECTOMY, SCAR REVISION  REVISION OF LEFT MASTECTOMY SCAR     Allergies   Allergen Reactions    Adhesive Tape Hives and Itching     AND TEGADERM (BLISTERS), steri strips    Betadine [Povidone Iodine]      Blisters rash    No Known Allergies      Patient Active Problem List   Diagnosis    H/O  section    Polyhydramnios    Symptomatic cholelithiasis     labor third trimester with  delivery third trimester    Umbilical hernia without obstruction and without gangrene    Malignant neoplasm of lower-outer quadrant of right breast of female, estrogen receptor positive ([address]    Lethargy    Acute ethmoidal sinusitis    Leukocytosis    Breast cancer of lower-outer quadrant of right female breast ([address]    Malignant neoplasm of right female breast ([address]    Status post bilateral mastectomy     Past Medical History:   Diagnosis Date    Breast CA ([address]     right breast    Depression     Prolonged emergence from general anesthesia     Thyroid disease     hypothyroid     Past Surgical History:   Procedure Laterality Date    AXILLARY SURGERY Right 2021    RIGHT AXILLARY NODE BIOPSY performed by Sharon Metcalf MD at [address] Bilateral 3/2/2020    BILATERAL FIRST STAGE BREAST RECONSTRUCTION performed by Mathew Gibson MD at [address] Left 2020    INSERTION OF TISUUE EXPANDER LEFT SIDE CHEST WALL performed by Mathew Gibson MD at [address] Bilateral 2020    [address] RECONSTRUCTION AND REMOVAL OF PORTACATH performed by Toshia Laguna MD at [address]      core bx     SECTION      CHOLECYSTECTOMY  2016    ENDOMETRIAL ABLATION      MASTECTOMY Bilateral 3/2/2020    BILATERAL SIMPLE MASTECTOMY, RIGHT SENTINEL NODE BIOPSY (BLUE DYE ONLY)/ performed by Saran Hawkins MD at [address]      left tissue expander removed    PORT SURGERY N/A 10/16/2019    SINGLE LUMEN PORT-A-CATHETER INSERTION LEFT SUBCLAVIAN performed by Saran Hawkins MD at [address] EXTRACTION       Social History     Tobacco Use    Smoking status: Never Smoker    Smokeless tobacco: Never Used   Vaping Use    Vaping Use: Never used   Substance Use Topics    Alcohol use: Yes     Comment: occasionally    Drug use: No     Medications  Current Facility-Administered Medications on File Prior to Visit   Medication Dose Route Frequency Provider Last Rate Last Admin    0.9 % sodium chloride infusion   IntraVENous Continuous Erik Yu MD        sodium chloride flush 0.9 % injection 5-40 mL  5-40 mL IntraVENous 2 times per day Erik Yu MD        sodium chloride flush 0.9 % injection 5-40 mL  5-40 mL IntraVENous PRN Erik Yu MD        0.9 % sodium chloride infusion  25 mL IntraVENous PRN Erik Yu MD        cefOXitin (MEFOXIN) 2000 mg in dextrose 5% 50 mL (mini-bag)  2,000 mg IntraVENous Once Lea Cantu MD         Current Outpatient Medications on File Prior to Visit   Medication Sig Dispense Refill    ondansetron (ZOFRAN) 4 MG tablet Take 1 tablet by mouth every 8 hours as needed for Nausea or Vomiting 2 tablet 1    Multiple Vitamins-Minerals (THERAPEUTIC MULTIVITAMIN-MINERALS) tablet Take 1 tablet by mouth every evening      Probiotic Product (PROBIOTIC-10 PO) Take by mouth every evening      Cholecalciferol (VITAMIN D) 50 MCG (2000 UT) CAPS capsule Take 1,000 Units by mouth every evening      buPROPion (WELLBUTRIN XL) 150 MG extended release tablet Take 150 mg by mouth nightly       No current facility-administered medications for this visit. No current outpatient medications on file. Facility-Administered Medications Ordered in Other Visits   Medication Dose Route Frequency Provider Last Rate Last Admin    0.9 % sodium chloride infusion   IntraVENous Continuous Alicia Schmid MD        sodium chloride flush 0.9 % injection 5-40 mL  5-40 mL IntraVENous 2 times per day Alicia Schmid MD        sodium chloride flush 0.9 % injection 5-40 mL  5-40 mL IntraVENous PRN Alicia Schmid MD        0.9 % sodium chloride infusion  25 mL IntraVENous PRN Alicia Schmid MD        cefOXitin (MEFOXIN) 2000 mg in dextrose 5% 50 mL (mini-bag)  2,000 mg IntraVENous Once Jamel Smith MD         Vital Signs (Current)   There were no vitals filed for this visit. Vital Signs Statistics (for past 48 hrs)     Temp  Av.1 °F (36.2 °C)  Min: 97.1 °F (36.2 °C)   Min taken time: 22  Max: 97.1 °F (36.2 °C)   Max taken time: 22  Pulse  Av  Min: 87   Min taken time: 22 4130  Max: 87   Max taken time: 22  Resp  Av  Min: 16   Min taken time: 22  Max: 16   Max taken time: 22  BP  Min: 115/70   Min taken time: 22  Max: 115/70   Max taken time: 22  SpO2  Av %  Min: 98 %   Min taken time: 22  Max: 98 %   Max taken time: 22  BP Readings from Last 3 Encounters:   22 115/70   21 (!) 102/57   21 134/78       BMI  There is no height or weight on file to calculate BMI. Estimated body mass index is 27.44 kg/m² as calculated from the following:    Height as of an earlier encounter on 22: 5' 4\" (1.626 m). Weight as of an earlier encounter on 22: 159 lb 13.3 oz (72.5 kg).     CBC   Lab Results Component Value Date    WBC 6.5 11/04/2021    RBC 4.55 11/04/2021    HGB 13.5 11/04/2021    HCT 40.0 11/04/2021    MCV 88.0 11/04/2021    RDW 13.2 11/04/2021     11/04/2021     CMP    Lab Results   Component Value Date     11/04/2021    K 5.5 11/04/2021    K 4.2 11/24/2019     11/04/2021    CO2 24 11/04/2021    BUN 12 11/04/2021    CREATININE 0.7 11/04/2021    GFRAA >60 11/04/2021    AGRATIO 1.5 11/04/2021    LABGLOM >60 11/04/2021    GLUCOSE 91 11/04/2021    PROT 7.7 11/04/2021    CALCIUM 9.4 11/04/2021    BILITOT 0.5 11/04/2021    ALKPHOS 75 11/04/2021    AST 34 11/04/2021    ALT 15 11/04/2021     BMP    Lab Results   Component Value Date     11/04/2021    K 5.5 11/04/2021    K 4.2 11/24/2019     11/04/2021    CO2 24 11/04/2021    BUN 12 11/04/2021    CREATININE 0.7 11/04/2021    CALCIUM 9.4 11/04/2021    GFRAA >60 11/04/2021    LABGLOM >60 11/04/2021    GLUCOSE 91 11/04/2021     POCGlucose  No results for input(s): GLUCOSE in the last 72 hours. Coags  No results found for: PROTIME, INR, APTT  HCG (If Applicable)   Lab Results   Component Value Date    PREGTESTUR Negative 01/25/2022      ABGs No results found for: PHART, PO2ART, LDC8PNA, KCX2FIW, BEART, W0MLZOFZ   Type & Screen (If Applicable)  No results found for: LABABO, LABRH                         BMI: Wt Readings from Last 3 Encounters:       NPO Status:                          Anesthesia Evaluation  Patient summary reviewed and Nursing notes reviewed   history of anesthetic complications: PONV.   Airway: Mallampati: II  TM distance: >3 FB   Neck ROM: full  Mouth opening: > = 3 FB Dental:          Pulmonary:       (-) COPD, asthma and shortness of breath                           Cardiovascular:        (-) hypertension, valvular problems/murmurs, past MI, CAD, CABG/stent, dysrhythmias,  angina and no hyperlipidemia                Neuro/Psych:   (+) psychiatric history:depression/anxiety    (-) seizures, TIA and CVA           GI/Hepatic/Renal:        (-) GERD, PUD, liver disease and no renal disease       Endo/Other:    (+) malignancy/cancer. (-) diabetes mellitus               Abdominal:             Vascular: negative vascular ROS. Other Findings:             Anesthesia Plan      general     ASA 2     (I discussed with the patient the risks and benefits of PIV, general anesthesia, IV Narcotics, PACU. All questions were answered the patient agrees with the plan.)  Induction: intravenous. Anesthetic plan and risks discussed with patient. Plan discussed with CRNA. This pre-anesthesia assessment may be used as a history and physical.    DOS STAFF ADDENDUM:    Pt seen and examined, chart reviewed (including anesthesia, drug and allergy history). No interval changes to history and physical examination. Anesthetic plan, risks, benefits, alternatives, and personnel involved discussed with patient. Patient verbalized an understanding and agrees to proceed.       Ailyn Mukherjee MD  January 25, 2022  6:52 AM

## 2022-01-25 NOTE — PROGRESS NOTES
Resting quietly. Lying in bed with eyes closed. Vss.  at bedside.  Patient and  both seemed relieved to know that the patient will be spending the night in the hospital.

## 2022-01-25 NOTE — OP NOTE
[address] [facility]                                [facility]ATIVE REPORT    PATIENT NAME: Kamilah Vasquez               :        1990  MED REC NO:   [id number]                          ROOM:  ACCOUNT NO:   [de-identified]                           ADMIT DATE: 2022  PROVIDER:     Jodi Kuo MD    DATE OF PROCEDURE:  2022    PREOPERATIVE DIAGNOSIS:  Bladder injury. POSTOPERATIVE DIAGNOSIS:  Bladder injury. OPERATION PERFORMED:  Complex robotic cystotomy repair. SURGEON:  Jodi Kuo MD    ANESTHESIA:  General.    DRAINS:  A 16-Micronesian urethral Lancaster catheter. SPECIMEN:  None. EBL:  Minimal.    COMPLICATIONS:  None immediate for my portion. DISPOSITION:  We will plan cystogram in approximately 10 days and Lancaster  removal if no leak noted. INDICATIONS:  The patient is a 27-year-old female who was undergoing a  hysterectomy with Dr. Markie Santos when a bladder injury was noted. I was  called to the OR for repair. OPERATIVE PROCEDURE:  The patient was already asleep on the table with  robot in place. Cystotomy was noted on the posterior bladder wall. It  was not a straight line, but more of a V-shaped injury to the bladder. I first dropped the Lancaster balloon and was able to visualize both the  ureteral orifices with good clear efflux, which were well away from the  injury. I performed a complex closure in two layers. I first closed  the most posterior aspect of the defect with a 3-0 Vicryl through the  mucosa and muscularis. I then closed the anterior defect in the same  manner. This closed most of it. There was a small leak noted and I  oversewed this with another 3-0 Vicryl in a figure-of-eight type fashion  reinforcing the apex of this V-shaped defect.     I then somewhat imbricated the muscularis and serosal layers over top of  the main incision line with a couple of extra 3-0 Vicryl sutures. I  then repeated the fill test and no significant leak was noted. Balloon  was inflated and Lancaster left in place. I closed some of the  extraperitoneal fat over top of the bladder with a 3-0 V-Loc just to  give an extra layer of protection. All needles were removed and the  procedure was ended from my standpoint.     I will plan to see her back in approximately 10 days with Lancaster removal  after a cystogram.        Geoffrey Wiggins MD    D: 01/25/2022 10:53:25       T: 01/25/2022 12:15:54     [email or web address]  Job#: [id number]     Doc#: [id number]    CC:

## 2022-01-25 NOTE — PROGRESS NOTES
Vss. patient is lying quietly in bed with eyes closed. Will respond if asked a direct question but is unwilling to open her eyes or move. She seems very sleepy. Says her pain is lessening after analgesic was given and is currently 7/10. Surgical glues sites are clean dry intact. Areas without swelling or bruising. Clear pink urine in calle bag.  at bedside.  expressing his uncertainty about taking care of patient at home tonight.

## 2022-01-25 NOTE — PROGRESS NOTES
Vss. patient says she is tired and does not feel she can get out of bed. Lying quietly in bed with eyes closed. Dr Rosangela Muller notified and is writing orders to admit the patient.

## 2022-01-25 NOTE — PROGRESS NOTES
Vaginal Sweep Documentation     Vaginal prep sponge count performed by RL* and CH*. Count correct. Vaginal sweep performed by Dr. Garrett Agrawal*  at [phone] [id number]*. No foreign objects or vaginal tears noted.

## 2022-01-25 NOTE — OP NOTE
[address] [facility]                                OPERATIVE REPORT    PATIENT NAME: Alberta Mabry               :        1990  MED REC NO:   [id number]                          ROOM:  ACCOUNT NO:   [de-identified]                           ADMIT DATE: 2022  PROVIDER:     Dilsahd Ferreira MD    DATE OF PROCEDURE:  2022    PREOPERATIVE DIAGNOSES:  1. History of right breast cancer. 2.  History of bilateral mastectomies with immediate breast  reconstruction. 3.  Redundant tissue, left mastectomy site. POSTOPERATIVE DIAGNOSES:  1. History of right breast cancer. 2.  History of bilateral mastectomies with immediate breast  reconstruction. 3.  Redundant tissue, left mastectomy site. OPERATION PERFORMED:  Revision of left mastectomy scar (5 x 1 cm). SURGEON:  Dilshad Ferreira MD    ANESTHESIA:  General.    ESTIMATED BLOOD LOSS:  Less than 50 mL. SPECIMENS:  Skin and tissue, left mastectomy site. INDICATIONS:  The patient is a 26-year-old female with a history of  right breast cancer. She has undergone bilateral mastectomy with breast  reconstruction. She has an area of redundant skin and tissue in the  left mastectomy site that has become visible in most clothing. She is  scheduled for removal of her tubes and ovaries today and asked if this  skin could be fixed. She presents today for surgery. OPERATIVE PROCEDURE:  The patient was brought to the operating room,  placed on the OR table in supine position. The area in question had  been marked when the patient was in a sitting position prior to surgery. The skin was prepped with Hibiclens and draped in the usual sterile  manner. An elliptical incision was made following this previously drawn  pattern. The area of skin and subcutaneous tissue was then grasped and  removed down to the level of the capsule.   The subcutaneous tissue was  closed with interrupted 3-0 Vicryl suture, and the skin was closed with  a 4-0 Vicryl subcuticular stitch. Dermabond was applied. At this point, Dr. Annette Ramirez was in the room to perform the remainder of  her surgery and this will be dictated under a separate cover.         Ronal Fish MD    D: 01/25/2022 8:16:26       T: 01/25/2022 8:19:00     [email or web address]  Job#: [id number]     Doc#: [id number]    CC:  Wilder Macdonald MD

## 2022-01-25 NOTE — PROGRESS NOTES
Pt arrived to PACU from OR. Pt asleep on 2l/nc. Abd soft. Incisions x 4 DESMOND with surgical glue noted. Lancaster to gravity draining clear, cherry colored urine.

## 2022-01-25 NOTE — ANESTHESIA POSTPROCEDURE EVALUATION
Department of Anesthesiology  Postprocedure Note    Patient: Ani San  MRN: [id number]  YOB: 1990  Date of evaluation: 1/25/2022  Time:  3:30 PM     Procedure Summary     Date: 01/25/22 Room / Location:  [facility] [address] [place] [address]    Anesthesia Start: 1669 Anesthesia Stop: 1114    Procedures:       [address], BILATERAL SALPINGO OOPHORECTOMY, SCAR REVISION, CYSTOTOMY REPAIR (Bilateral Pelvis)      REVISION OF LEFT MASTECTOMY SCAR (Left Breast) Diagnosis:       Personal history of breast cancer      Malignant neoplasm of left female breast, unspecified estrogen receptor status, unspecified site of breast ([facility]      Other postprocedural complications of skin and subcutaneous tissue      (PERSONAL HISTORY OF BREAST CANCER, BREAST CANCER, POST PROCEDURAL COMPLICATIONS OF SKIN / SUBCUTANEOUS TISSUE)    Surgeons: Lea Cantu MD; Saran Hawkins MD Responsible Provider: Chas Zelaya MD    Anesthesia Type: general ASA Status: 2          Anesthesia Type: general    Cliff Phase I: Cliff Score: 8    Cliff Phase II: Cliff Score: 10    Last vitals: Reviewed and per EMR flowsheets.        Anesthesia Post Evaluation    Level of consciousness: awake and alert  Airway patency: patent  Nausea & Vomiting: no nausea and no vomiting  Complications: no  Cardiovascular status: hemodynamically stable  Respiratory status: acceptable  Hydration status: stable

## 2022-01-25 NOTE — BRIEF OP NOTE
Brief Postoperative Note    Name           : Tiny Fraser  YOB: 1990  MRN             : [id number]    Pre-operative Diagnosis: 1. H/o right breast cancer  2. Bilateral mastectomy with reconstruction  3. Redundant tissue left mastectomy site    Post-operative Diagnosis: Same    Procedure: 1. Revision left mastectomy scar    Anesthesia: General    Surgeons/Assistants: Yordan    Estimated Blood Loss: less than 50     Complications: None    Specimens: Was Obtained: 1.  Skin and tissue left mastectomy site    Findings: same as post op    Electronically signed by Les Romano MD on 1/25/2022 at 8:11 AM

## 2022-01-25 NOTE — OP NOTE
DATE OF PROCEDURE:  1/25/22     PREOPERATIVE DIAGNOSES:    1. Personal history of breast cancer, 2. PALB2 + gene, 3. Menorrhagia     POSTOPERATIVE DIAGNOSES:  same     OPERATION PERFORMED:  Da Dave- Robotic assisted total laparoscopic  hysterectomy, bilateral salpingo-oophorectomy, Repair of cystotomy per Dr. Dawson Ashley:  Yuri Morrison MD     ANESTHESIA:  General.     COMPLICATIONS:  Incidental cystotomy     EBL:  Less than 100 mL. INDICATIONS:  This is a 32 yo female with a history of breast cancer, estrogen receptor +. She was recommended for removal of her ovaries by her oncologist. She also has a history of menorrhagia, for which she has attempted ablation and Mirena IUD. The risks, benefits, indications, and alternatives of a da Dave TLH and bilateral salpingo-oophorectomy were discussed with the patient. All questions were answered. OPERATIVE PROCEDURE:  The patient was taken to the operating room with  IV running. General anesthesia was obtained. The patient was prepped  and draped in the usual sterile fashion in the dorsal lithotomy  position. The cervix was visualized using Correia retractors and a  ForniSee uterine manipulator was placed. The other instruments were  removed from the vagina. Attention was turned to the abdomen where a 8-mm supraumbilical  incision was made with a scalpel. A Veress needle was placed in  through the incision into the peritoneal cavity. Intraperitoneal  placement was documented using saline drop test and low pressures upon  insufflation of CO2 gas. When approximately 3 L of CO2 gas was  insufflated, the Veress was removed and the 8-mm trocar was placed in  through the incision into the peritoneal cavity. Intraperitoneal placement was documented using direct visualization  with the laparoscope. Pelvis was examined noting a slightly enlarged uterus, normal-appearing tubes and ovaries bilaterally. The ovaries contained several follicular cysts. There was also noted to be omental adhesions to the anterior abdominal wall and bladder adhesions to the anterior uterus. Secondary trocar sites were placed, one each in the lower quadrant and the assistant port in the right upper quadrant. The robot was docked. The [place] bipolar was placed in the left arm and monopolar scissors in the right arm. From the console, the infundibulopelvic ligament was coagulated and cut using the bipolar and monopolar. The round ligaments on either side were coagulated and cut, and hemostasis was assured. The broad ligaments were taken down on either  side with monopolar and bipolar, and the anterior leaf of the broad  ligament was carried around to the midline to create the bladder flap. The bladder was dissected away from the cervix, carefully dissecting the dense adhesions. The uterine arteries were coagulated with [place] bipolar and cut using monopolar scissors. The ForniSee ring could be visualized  between the uterosacral ligaments posteriorly, and an incision was  made over it with monopolar scissors to create the posterior colpotomy. The ring was visualized anteriorly as well, and and the anterior colpotomy was made over the ring with the monopolar scissiors. This incision was carried  around bilaterally to fully amputate the uterus and cervix from the  vagina. The entire specimen was delivered through the vagina. At this time, it was recognized that there was an incidental cystotomy created in the bladder. The  vaginal cuff was then closed with a series of figure-of-eight 0 Vicryl  sutures. Hemostasis was assured. Urology was consulted to repair the cystotomy. Naseem was placed over the cuff for additional hemostasis. The instruments were removed from the abdomen. There robot was undocked. Each trocar was removed and the skin incisions closed with 4-0 vicryl. The patient tolerated the procedure well. All counts were correct.  The patient was taken  to recovery room in stable condition.

## 2022-01-25 NOTE — OP NOTE
Operative Note      Patient: Leyla Lemos  YOB: 1990  MRN: [id number]    Date of Procedure: 1/25/2022    Pre-Op Diagnosis: PERSONAL HISTORY OF BREAST CANCER, PALB2 + gene; menorrhagia    Post-Op Diagnosis: Same       Procedure: [facility] BSO, repair of incidental cystotomy    Surgeon(s):  Sande Oiler, MD Donnette Batter, MD Charmayne Martens, MD    Assistant:   Surgical Assistant: Mike Hodgson    Anesthesia: General    Estimated Blood Loss (mL): less than 50     Complications: None    Specimens:   ID Type Source Tests Collected by Time Destination   A : A. Skin left mastectomy scar Specimen Skin SURGICAL PATHOLOGY Charmayne Martens, MD 1/25/2022 0802    B : Uterus, cervix, bilateral tubes and ovaries Tissue Tissue SURGICAL PATHOLOGY Charmayne Martens, MD 1/25/2022 2658        Implants:  * No implants in log *      Drains:   Urethral Catheter Non-latex; Double-lumen 16 fr (Active)   Urine Color Cherry 01/25/22 1108   Urine Appearance Clear 01/25/22 1108       Findings: omental adhesions to anterior abdominal wall, bladder adhesions to anterior uterus, endometriosis of right ovary    Detailed Description of Procedure:   See detailed report    Electronically signed by Charmayne Martens, MD on 1/25/2022 at 11:14 AM

## 2022-01-25 NOTE — PLAN OF CARE
Problem: Activity:  Goal: Ability to return to normal activity level will improve  Description: Ability to return to normal activity level will improve  Outcome: Ongoing     Problem:  Bowel/Gastric:  Goal: Gastrointestinal status for postoperative course will improve  Description: Gastrointestinal status for postoperative course will improve  Outcome: Ongoing     Problem: Health Behavior:  Goal: Identification of resources available to assist in meeting health care needs will improve  Description: Identification of resources available to assist in meeting health care needs will improve  Outcome: Ongoing     Problem: Physical Regulation:  Goal: Postoperative complications will be avoided or minimized  Description: Postoperative complications will be avoided or minimized  Outcome: Ongoing     Problem: Respiratory:  Goal: Ability to achieve and maintain a regular respiratory rate will improve  Description: Ability to achieve and maintain a regular respiratory rate will improve  Outcome: Ongoing     Problem: Safety:  Goal: Ability to remain free from injury will improve  Description: Ability to remain free from injury will improve  Outcome: Ongoing     Problem: Sensory:  Goal: General experience of comfort will improve  Description: General experience of comfort will improve  Outcome: Ongoing     Problem: Skin Integrity:  Goal: Demonstration of wound healing without infection will improve  Description: Demonstration of wound healing without infection will improve  Outcome: Ongoing

## 2022-01-26 VITALS
OXYGEN SATURATION: 98 % | DIASTOLIC BLOOD PRESSURE: 69 MMHG | TEMPERATURE: 98.2 F | SYSTOLIC BLOOD PRESSURE: 105 MMHG | BODY MASS INDEX: 27.29 KG/M2 | HEIGHT: 64 IN | WEIGHT: 159.83 LBS | RESPIRATION RATE: 18 BRPM | HEART RATE: 76 BPM

## 2022-01-26 PROCEDURE — G0378 HOSPITAL OBSERVATION PER HR: HCPCS

## 2022-01-26 PROCEDURE — 96374 THER/PROPH/DIAG INJ IV PUSH: CPT

## 2022-01-26 PROCEDURE — 6370000000 HC RX 637 (ALT 250 FOR IP): Performed by: OBSTETRICS & GYNECOLOGY

## 2022-01-26 PROCEDURE — 6360000002 HC RX W HCPCS: Performed by: OBSTETRICS & GYNECOLOGY

## 2022-01-26 PROCEDURE — 2580000003 HC RX 258: Performed by: ANESTHESIOLOGY

## 2022-01-26 PROCEDURE — 2580000003 HC RX 258: Performed by: OBSTETRICS & GYNECOLOGY

## 2022-01-26 RX ORDER — SIMETHICONE 80 MG
80 TABLET,CHEWABLE ORAL EVERY 6 HOURS PRN
Status: DISCONTINUED | OUTPATIENT
Start: 2022-01-26 | End: 2022-01-26 | Stop reason: HOSPADM

## 2022-01-26 RX ORDER — SIMETHICONE 80 MG
80 TABLET,CHEWABLE ORAL 4 TIMES DAILY PRN
Qty: 180 TABLET | Refills: 3 | Status: SHIPPED | OUTPATIENT
Start: 2022-01-26 | End: 2022-03-21

## 2022-01-26 RX ORDER — IBUPROFEN 800 MG/1
TABLET ORAL
Status: DISCONTINUED
Start: 2022-01-26 | End: 2022-01-26 | Stop reason: HOSPADM

## 2022-01-26 RX ORDER — OXYCODONE HYDROCHLORIDE AND ACETAMINOPHEN 5; 325 MG/1; MG/1
1 TABLET ORAL EVERY 4 HOURS PRN
Qty: 12 TABLET | Refills: 0 | Status: SHIPPED | OUTPATIENT
Start: 2022-01-26 | End: 2022-01-29

## 2022-01-26 RX ORDER — OXYCODONE HYDROCHLORIDE AND ACETAMINOPHEN 5; 325 MG/1; MG/1
2 TABLET ORAL EVERY 4 HOURS PRN
Status: DISCONTINUED | OUTPATIENT
Start: 2022-01-26 | End: 2022-01-26 | Stop reason: HOSPADM

## 2022-01-26 RX ORDER — DOCUSATE SODIUM 100 MG/1
100 CAPSULE, LIQUID FILLED ORAL 2 TIMES DAILY
Qty: 60 CAPSULE | Refills: 0 | Status: SHIPPED | OUTPATIENT
Start: 2022-01-26 | End: 2022-02-25

## 2022-01-26 RX ORDER — IBUPROFEN 800 MG/1
800 TABLET ORAL EVERY 6 HOURS PRN
Status: DISCONTINUED | OUTPATIENT
Start: 2022-01-26 | End: 2022-01-26 | Stop reason: HOSPADM

## 2022-01-26 RX ORDER — IBUPROFEN 400 MG/1
800 TABLET ORAL EVERY 6 HOURS PRN
Qty: 60 TABLET | Refills: 3 | Status: SHIPPED | OUTPATIENT
Start: 2022-01-27

## 2022-01-26 RX ADMIN — OXYCODONE HYDROCHLORIDE AND ACETAMINOPHEN 1 TABLET: 5; 325 TABLET ORAL at 14:05

## 2022-01-26 RX ADMIN — SODIUM CHLORIDE: 9 INJECTION, SOLUTION INTRAVENOUS at 04:04

## 2022-01-26 RX ADMIN — SIMETHICONE 80 MG: 80 TABLET, CHEWABLE ORAL at 16:38

## 2022-01-26 RX ADMIN — KETOROLAC TROMETHAMINE 30 MG: 30 INJECTION, SOLUTION INTRAMUSCULAR at 03:53

## 2022-01-26 RX ADMIN — IBUPROFEN 800 MG: 800 TABLET, FILM COATED ORAL at 16:33

## 2022-01-26 RX ADMIN — OXYCODONE HYDROCHLORIDE AND ACETAMINOPHEN 1 TABLET: 5; 325 TABLET ORAL at 05:48

## 2022-01-26 RX ADMIN — OXYCODONE HYDROCHLORIDE AND ACETAMINOPHEN 2 TABLET: 5; 325 TABLET ORAL at 18:02

## 2022-01-26 RX ADMIN — SIMETHICONE 80 MG: 80 TABLET, CHEWABLE ORAL at 11:06

## 2022-01-26 RX ADMIN — OXYCODONE HYDROCHLORIDE AND ACETAMINOPHEN 1 TABLET: 5; 325 TABLET ORAL at 02:03

## 2022-01-26 RX ADMIN — IBUPROFEN 800 MG: 800 TABLET, FILM COATED ORAL at 10:03

## 2022-01-26 RX ADMIN — Medication 10 ML: at 03:55

## 2022-01-26 RX ADMIN — OXYCODONE HYDROCHLORIDE AND ACETAMINOPHEN 1 TABLET: 5; 325 TABLET ORAL at 10:07

## 2022-01-26 ASSESSMENT — PAIN SCALES - GENERAL
PAINLEVEL_OUTOF10: 4
PAINLEVEL_OUTOF10: 2
PAINLEVEL_OUTOF10: 8
PAINLEVEL_OUTOF10: 5
PAINLEVEL_OUTOF10: 7
PAINLEVEL_OUTOF10: 7

## 2022-01-26 NOTE — DISCHARGE SUMMARY
Department of Obstetrics and Gynecology  Gynecologic Discharge Summary      Admit Date: 1/25/2022    Admit Diagnosis: Personal history of breast cancer [Z85.3]  Malignant neoplasm of left female breast, unspecified estrogen receptor status, unspecified site of [facility] [C50.912]  Other postprocedural complications of skin and subcutaneous tissue [L76.82]  Postoperative pain [G89.18]    Discharge Date: No discharge date for patient encounter. Discharge Diagnoses: <principal problem not specified>       Medication List      START taking these medications    ciprofloxacin 250 MG tablet  Commonly known as: CIPRO  Take 1 tablet by mouth 2 times daily for 3 days     docusate sodium 100 MG capsule  Commonly known as: COLACE  Take 1 capsule by mouth 2 times daily     ibuprofen 400 MG tablet  Commonly known as: ADVIL;MOTRIN  Take 2 tablets by mouth every 6 hours as needed for Pain  Start taking on: January 27, 2022     oxyCODONE-acetaminophen 5-325 MG per tablet  Commonly known as: PERCOCET  Take 1 tablet by mouth every 4 hours as needed for Pain for up to 3 days.      simethicone 80 MG chewable tablet  Commonly known as: MYLICON  Take 1 tablet by mouth 4 times daily as needed for Flatulence        CONTINUE taking these medications    buPROPion 150 MG extended release tablet  Commonly known as: WELLBUTRIN XL     ondansetron 4 MG tablet  Commonly known as: ZOFRAN  Take 1 tablet by mouth every 8 hours as needed for Nausea or Vomiting     PROBIOTIC-10 PO     therapeutic multivitamin-minerals tablet     vitamin D 50 MCG (2000 UT) Caps capsule           Where to Get Your Medications      These medications were sent to [address] [facility], [address] [facility] [phone]  [address] [address]    Phone: [phone]   · ciprofloxacin 250 MG tablet     You can get these medications from any pharmacy    Bring a paper prescription for each of these medications  · docusate sodium 100 MG capsule  · ibuprofen 400 MG tablet  · oxyCODONE-acetaminophen 5-325 MG per tablet  · simethicone 80 MG chewable tablet         Service: Gynecology    Consults: none and urology    Significant Diagnostic Studies: none    Condition at Discharge: good    Hospital Course: uncomplicated    Discharge Instructions: Activity: as tolerated    Diet: regular diet    Instructions: No driving while using pain medications, no heavy lifting, vaginal rest for 6 weeks, keep incision clean and dry, call office if have fever>100.4 F, severe abdominal pain, vaginal bleeding, or constipation.        Discharge to: Home    Disposition / Followup: Return to office in 2 weeks    Electronically signed by Jamel Smith MD on 1/26/2022 at 8:33 AM

## 2022-01-26 NOTE — PLAN OF CARE
Problem: Activity:  Goal: Ability to return to normal activity level will improve  Description: Ability to return to normal activity level will improve  1/26/2022 0600 by Todd Joyner RN  Outcome: Ongoing  1/25/2022 1618 by Elsa Gomez  Outcome: Ongoing     Problem:  Bowel/Gastric:  Goal: Gastrointestinal status for postoperative course will improve  Description: Gastrointestinal status for postoperative course will improve  1/26/2022 0600 by Todd Joyner RN  Outcome: Ongoing  1/25/2022 1618 by Elsa Gomez  Outcome: Ongoing     Problem: Health Behavior:  Goal: Identification of resources available to assist in meeting health care needs will improve  Description: Identification of resources available to assist in meeting health care needs will improve  1/25/2022 1618 by Elsa Gomez  Outcome: Ongoing     Problem: Physical Regulation:  Goal: Postoperative complications will be avoided or minimized  Description: Postoperative complications will be avoided or minimized  1/25/2022 1618 by Elsa Gomez  Outcome: Ongoing     Problem: Respiratory:  Goal: Ability to achieve and maintain a regular respiratory rate will improve  Description: Ability to achieve and maintain a regular respiratory rate will improve  1/26/2022 0600 by Todd Joyner RN  Outcome: Met This Shift  1/25/2022 1618 by Elsa Gomez  Outcome: Ongoing     Problem: Safety:  Goal: Ability to remain free from injury will improve  Description: Ability to remain free from injury will improve  1/26/2022 0600 by Todd Joyner RN  Outcome: Met This Shift  1/25/2022 1618 by Elsa Gomez  Outcome: Ongoing     Problem: Sensory:  Goal: General experience of comfort will improve  Description: General experience of comfort will improve  1/25/2022 1618 by Elsa Gomez  Outcome: Ongoing     Problem: Skin Integrity:  Goal: Demonstration of wound healing without infection will improve  Description: Demonstration of wound healing without infection will improve  1/25/2022 1618 by Nanci Landry  Outcome: Ongoing

## 2022-01-26 NOTE — PLAN OF CARE
Problem: Activity:  Goal: Ability to return to normal activity level will improve  Description: Ability to return to normal activity level will improve  1/26/2022 1752 by Ted Mckeon RN  Outcome: Completed  1/26/2022 0600 by Mariposa Joyner RN  Outcome: Ongoing     Problem:  Bowel/Gastric:  Goal: Gastrointestinal status for postoperative course will improve  Description: Gastrointestinal status for postoperative course will improve  1/26/2022 1752 by Ted Mckeon RN  Outcome: Completed  1/26/2022 0600 by Mariposa Joyner RN  Outcome: Ongoing     Problem: Health Behavior:  Goal: Identification of resources available to assist in meeting health care needs will improve  Description: Identification of resources available to assist in meeting health care needs will improve  Outcome: Completed     Problem: Physical Regulation:  Goal: Postoperative complications will be avoided or minimized  Description: Postoperative complications will be avoided or minimized  Outcome: Completed     Problem: Respiratory:  Goal: Ability to achieve and maintain a regular respiratory rate will improve  Description: Ability to achieve and maintain a regular respiratory rate will improve  1/26/2022 1752 by Ted Mckeon RN  Outcome: Completed  1/26/2022 0600 by Mariposa Joyner RN  Outcome: Met This Shift     Problem: Safety:  Goal: Ability to remain free from injury will improve  Description: Ability to remain free from injury will improve  1/26/2022 1752 by Ted Mckeon RN  Outcome: Completed  1/26/2022 0600 by Mariposa Jyoner RN  Outcome: Met This Shift     Problem: Sensory:  Goal: General experience of comfort will improve  Description: General experience of comfort will improve  Outcome: Completed     Problem: Skin Integrity:  Goal: Demonstration of wound healing without infection will improve  Description: Demonstration of wound healing without infection will improve  Outcome: Completed     Problem: Pain:  Goal: Pain level will decrease  Description: Pain level will decrease  Outcome: Completed  Goal: Control of acute pain  Description: Control of acute pain  Outcome: Completed  Goal: Control of chronic pain  Description: Control of chronic pain  Outcome: Completed

## 2022-01-26 NOTE — PROGRESS NOTES
Department of Obstetrics and Gynecology  Gynecology Rounds    SUBJECTIVE:  Pain is controlled with non-steroidal anti-inflammatory drugs or narcotic analgesics. The patient is tolerating  regular diet. She is ambulating. On ROS she denies nausea or vomiting. OBJECTIVE:  Vital Signs: /69   Pulse 78   Temp 98.2 °F (36.8 °C) (Oral)   Resp 16   Ht 5' 4\" (1.626 m)   Wt 159 lb 13.3 oz (72.5 kg)   SpO2 98%   BMI 27.44 kg/m²   Appearance/Psychiatric: awake, alert, cooperative, no apparent distress  Constitutional: The patient is well nourished. Cardiovascular: She does not have edema. Respiratory: Respiratory effort is normal.  Gastrointestinal: abdomen is soft, there is no tenderness on palpation, bowel sounds Present  The incision is clean and dry  Extremities: nontender to palpation    ASSESSMENT:    Postoperative Day 1 s/p robotically assisted laparoscopic hysterectomy    PLAN:   1. Advance postoperative care as tolerated  2. Discharge home on Postoperative Day 1  3. Return to office in 2 weeks   4.  Postoperative instructions reviewed and all patient's Questions answered    Electronically signed by Virgie Michel MD on 1/26/2022 at 8:28 AM

## 2022-01-26 NOTE — FLOWSHEET NOTE
Bedside handoff completed. All questions answered. Orders reviewed. Patient included in discussion regarding plan of care. Report P. Da/ YULI. Call OOB to chair, given incentive spirometer.

## 2022-02-02 ENCOUNTER — HOSPITAL ENCOUNTER (OUTPATIENT)
Dept: GENERAL RADIOLOGY | Age: 32
Discharge: HOME | End: 2022-02-02
Payer: OTHER GOVERNMENT

## 2022-02-02 DIAGNOSIS — S37.20XA INJURY OF BLADDER, INITIAL ENCOUNTER: ICD-10-CM

## 2022-02-02 PROCEDURE — 74430 CONTRAST X-RAY BLADDER: CPT

## 2022-02-17 ENCOUNTER — HOSPITAL ENCOUNTER (EMERGENCY)
Age: 32
Discharge: HOME | End: 2022-02-17
Payer: OTHER GOVERNMENT

## 2022-02-17 ENCOUNTER — APPOINTMENT (OUTPATIENT)
Dept: CT IMAGING | Age: 32
End: 2022-02-17
Payer: OTHER GOVERNMENT

## 2022-02-17 ENCOUNTER — APPOINTMENT (OUTPATIENT)
Dept: GENERAL RADIOLOGY | Age: 32
End: 2022-02-17
Payer: OTHER GOVERNMENT

## 2022-02-17 ENCOUNTER — APPOINTMENT (OUTPATIENT)
Dept: ULTRASOUND IMAGING | Age: 32
End: 2022-02-17
Payer: OTHER GOVERNMENT

## 2022-02-17 VITALS
HEART RATE: 76 BPM | OXYGEN SATURATION: 100 % | RESPIRATION RATE: 12 BRPM | DIASTOLIC BLOOD PRESSURE: 65 MMHG | SYSTOLIC BLOOD PRESSURE: 102 MMHG | WEIGHT: 165.57 LBS | BODY MASS INDEX: 28.42 KG/M2 | TEMPERATURE: 98.2 F

## 2022-02-17 DIAGNOSIS — R55 SYNCOPE AND COLLAPSE: Primary | ICD-10-CM

## 2022-02-17 DIAGNOSIS — T50.905A ADVERSE EFFECT OF DRUG, INITIAL ENCOUNTER: ICD-10-CM

## 2022-02-17 DIAGNOSIS — R51.9 NONINTRACTABLE HEADACHE, UNSPECIFIED CHRONICITY PATTERN, UNSPECIFIED HEADACHE TYPE: ICD-10-CM

## 2022-02-17 DIAGNOSIS — E86.0 DEHYDRATION: ICD-10-CM

## 2022-02-17 LAB
ALBUMIN SERPL-MCNC: 4.2 G/DL (ref 3.4–5)
ALP BLD-CCNC: 101 U/L (ref 40–129)
ALT SERPL-CCNC: 16 U/L (ref 10–40)
AMPHETAMINE SCREEN, URINE: NORMAL
ANION GAP SERPL CALCULATED.3IONS-SCNC: 12 MMOL/L (ref 3–16)
AST SERPL-CCNC: 16 U/L (ref 15–37)
BARBITURATE SCREEN URINE: NORMAL
BASOPHILS ABSOLUTE: 0 K/UL (ref 0–0.2)
BASOPHILS RELATIVE PERCENT: 0.5 %
BENZODIAZEPINE SCREEN, URINE: NORMAL
BILIRUB SERPL-MCNC: 0.5 MG/DL (ref 0–1)
BILIRUBIN DIRECT: <0.2 MG/DL (ref 0–0.3)
BILIRUBIN URINE: NEGATIVE
BILIRUBIN, INDIRECT: NORMAL MG/DL (ref 0–1)
BLOOD, URINE: NEGATIVE
BUN BLDV-MCNC: 14 MG/DL (ref 7–20)
CALCIUM SERPL-MCNC: 9.3 MG/DL (ref 8.3–10.6)
CANNABINOID SCREEN URINE: NORMAL
CHLORIDE BLD-SCNC: 106 MMOL/L (ref 99–110)
CLARITY: CLEAR
CO2: 20 MMOL/L (ref 21–32)
COCAINE METABOLITE SCREEN URINE: NORMAL
COLOR: YELLOW
CREAT SERPL-MCNC: 0.8 MG/DL (ref 0.6–1.1)
D DIMER: <200 NG/ML DDU (ref 0–229)
EKG ATRIAL RATE: 87 BPM
EKG DIAGNOSIS: NORMAL
EKG P AXIS: 49 DEGREES
EKG P-R INTERVAL: 168 MS
EKG Q-T INTERVAL: 364 MS
EKG QRS DURATION: 86 MS
EKG QTC CALCULATION (BAZETT): 438 MS
EKG R AXIS: 65 DEGREES
EKG T AXIS: 49 DEGREES
EKG VENTRICULAR RATE: 87 BPM
EOSINOPHILS ABSOLUTE: 0.1 K/UL (ref 0–0.6)
EOSINOPHILS RELATIVE PERCENT: 1.9 %
GFR AFRICAN AMERICAN: >60
GFR NON-AFRICAN AMERICAN: >60
GLUCOSE BLD-MCNC: 78 MG/DL (ref 70–99)
GLUCOSE URINE: NEGATIVE MG/DL
HCG QUALITATIVE: NEGATIVE
HCT VFR BLD CALC: 39.3 % (ref 36–48)
HEMOGLOBIN: 13.6 G/DL (ref 12–16)
INR BLD: 0.99 (ref 0.88–1.12)
KETONES, URINE: NEGATIVE MG/DL
LEUKOCYTE ESTERASE, URINE: NEGATIVE
LIPASE: 14 U/L (ref 13–60)
LYMPHOCYTES ABSOLUTE: 1 K/UL (ref 1–5.1)
LYMPHOCYTES RELATIVE PERCENT: 17.5 %
Lab: NORMAL
MCH RBC QN AUTO: 32.8 PG (ref 26–34)
MCHC RBC AUTO-ENTMCNC: 34.7 G/DL (ref 31–36)
MCV RBC AUTO: 94.7 FL (ref 80–100)
METHADONE SCREEN, URINE: NORMAL
MICROSCOPIC EXAMINATION: NORMAL
MONOCYTES ABSOLUTE: 0.3 K/UL (ref 0–1.3)
MONOCYTES RELATIVE PERCENT: 4.9 %
NEUTROPHILS ABSOLUTE: 4.4 K/UL (ref 1.7–7.7)
NEUTROPHILS RELATIVE PERCENT: 75.2 %
NITRITE, URINE: NEGATIVE
OPIATE SCREEN URINE: NORMAL
OXYCODONE URINE: NORMAL
PDW BLD-RTO: 14.3 % (ref 12.4–15.4)
PH UA: 6
PH UA: 6 (ref 5–8)
PHENCYCLIDINE SCREEN URINE: NORMAL
PLATELET # BLD: 219 K/UL (ref 135–450)
PMV BLD AUTO: 7.4 FL (ref 5–10.5)
POTASSIUM REFLEX MAGNESIUM: 3.8 MMOL/L (ref 3.5–5.1)
PRO-BNP: 22 PG/ML (ref 0–124)
PROPOXYPHENE SCREEN: NORMAL
PROTEIN UA: NEGATIVE MG/DL
PROTHROMBIN TIME: 11.2 SEC (ref 9.9–12.7)
RBC # BLD: 4.15 M/UL (ref 4–5.2)
REASON FOR REJECTION: NORMAL
REJECTED TEST: NORMAL
SODIUM BLD-SCNC: 138 MMOL/L (ref 136–145)
SPECIFIC GRAVITY UA: 1.01 (ref 1–1.03)
TOTAL PROTEIN: 7.3 G/DL (ref 6.4–8.2)
TROPONIN: <0.01 NG/ML
URINE REFLEX TO CULTURE: NORMAL
URINE TYPE: NORMAL
UROBILINOGEN, URINE: 0.2 E.U./DL
WBC # BLD: 5.9 K/UL (ref 4–11)

## 2022-02-17 PROCEDURE — 76705 ECHO EXAM OF ABDOMEN: CPT

## 2022-02-17 PROCEDURE — 93010 ELECTROCARDIOGRAM REPORT: CPT | Performed by: INTERNAL MEDICINE

## 2022-02-17 PROCEDURE — 83880 ASSAY OF NATRIURETIC PEPTIDE: CPT

## 2022-02-17 PROCEDURE — 36415 COLL VENOUS BLD VENIPUNCTURE: CPT

## 2022-02-17 PROCEDURE — 6360000004 HC RX CONTRAST MEDICATION: Performed by: PHYSICIAN ASSISTANT

## 2022-02-17 PROCEDURE — 83690 ASSAY OF LIPASE: CPT

## 2022-02-17 PROCEDURE — 6360000002 HC RX W HCPCS: Performed by: PHYSICIAN ASSISTANT

## 2022-02-17 PROCEDURE — 81003 URINALYSIS AUTO W/O SCOPE: CPT

## 2022-02-17 PROCEDURE — 70450 CT HEAD/BRAIN W/O DYE: CPT

## 2022-02-17 PROCEDURE — 2580000003 HC RX 258: Performed by: PHYSICIAN ASSISTANT

## 2022-02-17 PROCEDURE — 80076 HEPATIC FUNCTION PANEL: CPT

## 2022-02-17 PROCEDURE — 85025 COMPLETE CBC W/AUTO DIFF WBC: CPT

## 2022-02-17 PROCEDURE — 71260 CT THORAX DX C+: CPT

## 2022-02-17 PROCEDURE — 71045 X-RAY EXAM CHEST 1 VIEW: CPT

## 2022-02-17 PROCEDURE — 80048 BASIC METABOLIC PNL TOTAL CA: CPT

## 2022-02-17 PROCEDURE — 96374 THER/PROPH/DIAG INJ IV PUSH: CPT

## 2022-02-17 PROCEDURE — 85610 PROTHROMBIN TIME: CPT

## 2022-02-17 PROCEDURE — 84484 ASSAY OF TROPONIN QUANT: CPT

## 2022-02-17 PROCEDURE — 96361 HYDRATE IV INFUSION ADD-ON: CPT

## 2022-02-17 PROCEDURE — 74177 CT ABD & PELVIS W/CONTRAST: CPT

## 2022-02-17 PROCEDURE — 80307 DRUG TEST PRSMV CHEM ANLYZR: CPT

## 2022-02-17 PROCEDURE — 99283 EMERGENCY DEPT VISIT LOW MDM: CPT

## 2022-02-17 PROCEDURE — 93005 ELECTROCARDIOGRAM TRACING: CPT | Performed by: PHYSICIAN ASSISTANT

## 2022-02-17 PROCEDURE — 96375 TX/PRO/DX INJ NEW DRUG ADDON: CPT

## 2022-02-17 PROCEDURE — 84703 CHORIONIC GONADOTROPIN ASSAY: CPT

## 2022-02-17 PROCEDURE — 85379 FIBRIN DEGRADATION QUANT: CPT

## 2022-02-17 RX ORDER — KETOROLAC TROMETHAMINE 30 MG/ML
15 INJECTION, SOLUTION INTRAMUSCULAR; INTRAVENOUS ONCE
Status: COMPLETED | OUTPATIENT
Start: 2022-02-17 | End: 2022-02-17

## 2022-02-17 RX ORDER — METOCLOPRAMIDE HYDROCHLORIDE 5 MG/ML
10 INJECTION INTRAMUSCULAR; INTRAVENOUS ONCE
Status: COMPLETED | OUTPATIENT
Start: 2022-02-17 | End: 2022-02-17

## 2022-02-17 RX ORDER — 0.9 % SODIUM CHLORIDE 0.9 %
1000 INTRAVENOUS SOLUTION INTRAVENOUS ONCE
Status: COMPLETED | OUTPATIENT
Start: 2022-02-17 | End: 2022-02-17

## 2022-02-17 RX ORDER — DIPHENHYDRAMINE HYDROCHLORIDE 50 MG/ML
12.5 INJECTION INTRAMUSCULAR; INTRAVENOUS ONCE
Status: COMPLETED | OUTPATIENT
Start: 2022-02-17 | End: 2022-02-17

## 2022-02-17 RX ADMIN — IOPAMIDOL 75 ML: 755 INJECTION, SOLUTION INTRAVENOUS at 16:30

## 2022-02-17 RX ADMIN — DIPHENHYDRAMINE HYDROCHLORIDE 12.5 MG: 50 INJECTION, SOLUTION INTRAMUSCULAR; INTRAVENOUS at 15:28

## 2022-02-17 RX ADMIN — HYDROMORPHONE HYDROCHLORIDE 0.5 MG: 1 INJECTION, SOLUTION INTRAMUSCULAR; INTRAVENOUS; SUBCUTANEOUS at 15:28

## 2022-02-17 RX ADMIN — KETOROLAC TROMETHAMINE 15 MG: 30 INJECTION, SOLUTION INTRAMUSCULAR at 15:28

## 2022-02-17 RX ADMIN — SODIUM CHLORIDE 1000 ML: 9 INJECTION, SOLUTION INTRAVENOUS at 13:43

## 2022-02-17 RX ADMIN — METOCLOPRAMIDE HYDROCHLORIDE 10 MG: 5 INJECTION INTRAMUSCULAR; INTRAVENOUS at 15:28

## 2022-02-17 ASSESSMENT — PAIN SCALES - GENERAL: PAINLEVEL_OUTOF10: 10

## 2022-02-17 NOTE — ED PROVIDER NOTES
[address] [facility]        Pt Name: Yovani Torres  MRN: [id number]  [place] 1990  Date of evaluation: 2/17/2022  Provider: Cosme Baltazar PA-C  PCP: Roselyn Watters  Note Started: 12:40 PM EST       ABNER. I have evaluated this patient. My supervising physician was available for consultation. Priya Peguero MD      CHIEF COMPLAINT       Chief Complaint   Patient presents with    Loss of Consciousness     found unconcious at her desk at work, hysterectomy 3 weeks ago, breast cancer dx Nov 2021. HISTORY OF PRESENT ILLNESS   (Location, Timing/Onset, Context/Setting, Quality, Duration, Modifying Factors, Severity, Associated Signs and Symptoms)  Note limiting factors. Chief Complaint: Syncopal event, tachycardia    Yovani Torres is a 33 y.o. female who presents by EMS. Patient with syncopal event and decreased responsiveness at this time. The patient found at her desk unresponsive at 11:30 AM this morning. Assessment time approximately 12:20 PM-12:30 PM.  Patient with eyes closed but fluttering. Patient does assist.  Patient without history of seizure. Patient with a prior similar occurrence. EMS blood sugar 99. EMS pulse not known. ED presentation pulse 99 and /80. At recheck BP is 119/83. Patient history significant for right breast cancer diagnosed late 2019 with completion of chemo January, 2020. Patient BRCA positive the patient with identified right breast cancer with bilateral mastectomy March 20, 2020. The patient completed radiation therapy following recurrence on January, 2022.  3 weeks ago the patient underwent total abdominal hysterectomy. No history of DVT or PE. The patient with last PET scan November, 2021 showing 3 small hypermetabolic lymph nodes in the right axilla which underwent surgical excision.     It is reported by  that during the hysterectomy process bladder injury occurred and Dr. Dameon Peraza urology assisted with correction. Nursing Notes were all reviewed and agreed with or any disagreements were addressed in the HPI. REVIEW OF SYSTEMS    (2-9 systems for level 4, 10 or more for level 5)     Review of Systems    Positives and Pertinent negatives as per HPI. Except as noted above in the ROS, all other systems were reviewed and negative.        PAST MEDICAL HISTORY     Past Medical History:   Diagnosis Date    Breast [facility])     right breast    Depression     Prolonged emergence from general anesthesia     Thyroid disease     hypothyroid         SURGICAL HISTORY     Past Surgical History:   Procedure Laterality Date    AXILLARY SURGERY Right 2021    RIGHT AXILLARY NODE BIOPSY performed by Chioma Huang MD at [address] [facility] Bilateral 3/2/2020    BILATERAL FIRST STAGE BREAST RECONSTRUCTION performed by Sherry Vera MD at [facility] Left 2020    INSERTION OF TISUUE EXPANDER LEFT SIDE CHEST WALL performed by Sherry Vera MD at [place] [address] Bilateral 2020    BILATERAL SECOND STAGE BREAST RECONSTRUCTION AND REMOVAL OF PORTACATH performed by Sherry Vera MD at [address]      core bx     SECTION      CHOLECYSTECTOMY  2016    ENDOMETRIAL ABLATION      HYSTERECTOMY Bilateral 2022    DAVINCI TOTAL LAPAROSCOPIC HYSTERECTOMY, BILATERAL SALPINGO OOPHORECTOMY, SCAR REVISION, CYSTOTOMY REPAIR performed by Virgie Michel MD at [facility] Bilateral 3/2/2020    BILATERAL SIMPLE MASTECTOMY, RIGHT SENTINEL NODE BIOPSY (BLUE DYE ONLY)/ performed by Chioma Huang MD at [address] [facility]      left tissue expander removed    PORT SURGERY N/A 10/16/2019    SINGLE LUMEN PORT-A-CATHETER INSERTION LEFT SUBCLAVIAN performed by Chioma Huang MD at [address] [place] [address] Drive Left 2022 REVISION OF LEFT MASTECTOMY SCAR performed by Chioma Huang MD at [facility]       Discharge Medication List as of 2/17/2022  5:49 PM      CONTINUE these medications which have NOT CHANGED    Details   ibuprofen (ADVIL;MOTRIN) 400 MG tablet Take 2 tablets by mouth every 6 hours as needed for Pain, Disp-60 tablet, R-3Print      docusate sodium (COLACE) 100 MG capsule Take 1 capsule by mouth 2 times daily, Disp-60 capsule, R-0Print      simethicone (MYLICON) 80 MG chewable tablet Take 1 tablet by mouth 4 times daily as needed for Flatulence, Disp-180 tablet, R-3Print      ondansetron (ZOFRAN) 4 MG tablet Take 1 tablet by mouth every 8 hours as needed for Nausea or Vomiting, Disp-2 tablet, R-1Print      Multiple Vitamins-Minerals (THERAPEUTIC MULTIVITAMIN-MINERALS) tablet Take 1 tablet by mouth every eveningHistorical Med      Probiotic Product (PROBIOTIC-10 PO) Take by mouth every eveningHistorical Med      Cholecalciferol (VITAMIN D) 50 MCG (2000 UT) CAPS capsule Take 1,000 Units by mouth every eveningHistorical Med      buPROPion (WELLBUTRIN XL) 150 MG extended release tablet Take 150 mg by mouth nightlyHistorical Med               ALLERGIES     Adhesive tape, Betadine [povidone iodine], and No known allergies    FAMILYHISTORY       Family History   Problem Relation Age of Onset    Cancer Paternal Grandmother         breast    Thyroid Disease Paternal Grandmother           SOCIAL HISTORY       Social History     Tobacco Use    Smoking status: Never Smoker    Smokeless tobacco: Never Used   Vaping Use    Vaping Use: Never used   Substance Use Topics    Alcohol use: Yes     Comment: occasionally    Drug use: No       SCREENINGS             PHYSICAL EXAM    (up to 7 for level 4, 8 or more for level 5)     ED Triage Vitals [02/17/22 1222]   BP Temp Temp Source Pulse Resp SpO2 Height Weight   119/83 98.2 °F (36.8 °C) Oral 97 15 100 % -- 165 lb 9.1 oz (75.1 kg)       Physical Exam    DIAGNOSTIC RESULTS   LABS:    Labs Reviewed   BASIC METABOLIC PANEL W/ REFLEX TO MG FOR LOW K - Abnormal; Notable for the following components:       Result Value    CO2 20 (*)     All other components within normal limits    Narrative:     Performed at:  [facility]  [address] [place] [address]   Phone [phone]   HCG, SERUM, QUALITATIVE    Narrative:     Performed at:  [facility]  [address] [place], [facility] [place] [address]   Phone [phone]   CBC WITH AUTO DIFFERENTIAL    Narrative:     Performed at:  [facility]  [address] [facility] [place] [address]   Phone [phone]   PROTIME-INR    Narrative:     Performed at:  [facility]  [address] [facility] [place] [address]   Phone [phone]   D-DIMER, QUANTITATIVE    Narrative:     Performed at:  [facility]  [address] [place], [facility] [place] [address]   Phone [phone]   URINALYSIS WITH REFLEX TO CULTURE    Narrative:     Performed at:  [facility]  [address] [place], [facility] [place] [address]   Phone [phone]   URINE DRUG SCREEN    Narrative:     Performed at:  [facility]  [address] [facility] [place] [address]   Phone [phone]   SPECIMEN REJECTION    Narrative:     Performed at:  [facility]  [address] [place], [facility] [place] [address]   Phone [phone]   HEPATIC FUNCTION PANEL    Narrative:     Performed at:  [facility]  [address] [place], [facility] [place] [address]   Phone [phone]   LIPASE    Narrative:     Performed at:  [facility]  [address] [place], [facility] [place] [address]   Phone [phone]   TROPONIN    Narrative:     Performed at:  [facility]  [address] [place] [address]   Phone [phone]   BRAIN NATRIURETIC PEPTIDE    Narrative:     Performed at:  [facility]  [address] [place] [address]   Phone [phone]   POCT GLUCOSE       When ordered only abnormal lab results are displayed. All other labs were within normal range or not returned as of this dictation. EKG: When ordered, EKG's are interpreted by the Emergency Department Physician in the absence of a cardiologist.  Please see their note for interpretation of EKG. RADIOLOGY:   Non-plain film images such as CT, Ultrasound and MRI are read by the radiologist. Plain radiographic images are visualized and preliminarily interpreted by the ED Provider with the below findings:        Interpretation per the Radiologist below, if available at the time of this note:    CT Head WO Contrast   Final Result   1. No acute intracranial abnormality. CT CHEST PULMONARY EMBOLISM W CONTRAST   Final Result   1. Mild interstitial edema. CT ABDOMEN PELVIS W IV CONTRAST Additional Contrast? None   Final Result   1. Mild interstitial edema. US ABDOMEN LIMITED Specify organ? LIVER, GALLBLADDER, PANCREAS   Final Result   1. Patient status post cholecystectomy. 2. Otherwise, unremarkable right upper quadrant ultrasound. XR CHEST PORTABLE   Final Result   No radiographic evidence of acute pulmonary disease. No results found. PROCEDURES   Unless otherwise noted below, none     Procedures    CRITICAL CARE TIME   Critical Care  There was a high probability of life-threatening clinical deterioration in the patient's condition requiring my urgent intervention. Total critical care time with the patient was 45 minutes excluding separately reportable procedures.   Critical care required due to patients presentation by EMS with syncopal event and arousable to painful stimuli and less to verbal stimuli. Extensive evaluation finding no metastatic disease within the brain. Patient with known breast cancer and metastatic to lymph nodes right axilla. Patient received fluids with improvement along with treatment of migraine headache with improvement. I did discuss case with Dr. Jessica Estrada patient's breast surgeon. Time spent reevaluating the patient discussing with both patient and . CONSULTS:  None      EMERGENCY DEPARTMENT COURSE and DIFFERENTIAL DIAGNOSIS/MDM:   Vitals:    Vitals:    02/17/22 1609 02/17/22 1615 02/17/22 1715 02/17/22 1730   BP:  (!) 97/55 100/65 102/65   Pulse: 90 79 79 76   Resp: 15 15 12 12   Temp:       TempSrc:       SpO2: (!) 86% 100% 100% 100%   Weight:           Patient was given the following medications:  Medications   0.9 % sodium chloride bolus (0 mLs IntraVENous Stopped 2/17/22 1716)   HYDROmorphone (DILAUDID) injection 0.5 mg (0.5 mg IntraVENous Given 2/17/22 1528)   ketorolac (TORADOL) injection 15 mg (15 mg IntraVENous Given 2/17/22 1528)   metoclopramide (REGLAN) injection 10 mg (10 mg IntraVENous Given 2/17/22 1528)   diphenhydrAMINE (BENADRYL) injection 12.5 mg (12.5 mg IntraVENous Given 2/17/22 1528)   iopamidol (ISOVUE-370) 76 % injection 75 mL (75 mLs IntraVENous Given 2/17/22 1630)         At 3:20 PM received phone call from nursing staff. Patient having increased right upper quadrant abdominal pain. I placed order for ultrasound right lower quadrant as well as dilated 0.5 mg IV for pain control. Patient was complaining of headache. Previous head CT scan has been ordered. I have placed order for Reglan 10 mg IV, Benadryl 12.5 mg IV and Toradol 15 mg IV. Laboratory studies result. Patient improved with migraine cocktail. Fluids improved. Patient now more awake alert and talking. Extensive evaluation today. Dr. Tian Knight the patient's breast surgeon in department evaluating and overseeing. She is spoke with Dr. Emerson Baer.   Both comfortable and having the patient discharged to home with close follow-up by both providers. I did recommend ibuprofen and Tylenol at onset of next headache. She is to maintain good nutrition and hydration. The patient  both expressed understanding of the diagnosis and the treatment plan      FINAL IMPRESSION      1. Syncope and collapse    2. Nonintractable headache, unspecified chronicity pattern, unspecified headache type    3. Adverse effect of drug, initial encounter    4. Dehydration          DISPOSITION/PLAN   DISPOSITION        PATIENT REFERRED TO:  Jessenia Nuñez MD  [address] [place] [address]  [phone]    Schedule an appointment as soon as possible for a visit on 2/18/2022      [address] [place] [place] [address]  [phone]    Schedule an appointment as soon as possible for a visit   As needed    [address] [facility]  [address] [id number]  [phone]  Go to   If symptoms worsen      DISCHARGE MEDICATIONS:  Discharge Medication List as of 2/17/2022  5:49 PM          DISCONTINUED MEDICATIONS:  Discharge Medication List as of 2/17/2022  5:49 PM                 (Please note that portions of this note were completed with a voice recognition program.  Efforts were made to edit the dictations but occasionally words are mis-transcribed. )    Zoe Soares PA-C (electronically signed)           Zoe Soares PA-C  02/17/22 2044

## 2022-02-17 NOTE — ED NOTES
Pt SPO2 maintaining in the mid 80s with good pleth. Notified MARY ELLEN MUJICA. Pt placed on 2 liters NC. SPO2 returned to 100%.      Edinson Teran RN  02/17/22 9799

## 2022-02-18 NOTE — PROGRESS NOTES
Surgery Attending note    Patient well known to me with history of right breast cancer. Was found non-responsive at work today, transported to [place] [address] by [employer]. Her employer contacted me about the episode and I saw her in ultrasound and eventually in the ER. She reported nonlocalized abdominal pain x 48 hours, headache, light sensitivity, weakness and overall malaise x three days that worsened today. Had diarrhea yesterday - eventually took imodium with improvement. Reported minimal po intake x 48 hours. She was started on Verzenio ~2-3 weeks ago for recurrent right breast cancer. Recently completed right axillary/supraclavicular radiation. Vitals:    02/17/22 1609 02/17/22 1615 02/17/22 1715 02/17/22 1730   BP:  (!) 97/55 100/65 102/65   Pulse: 90 79 79 76   Resp: 15 15 12 12   Temp:       TempSrc:       SpO2: (!) 86% 100% 100% 100%   Weight:          Gen: WDWN 34 yo female - covering eyes with sweatshirt on initial exam, responds to questions O x 3  Abd: soft, nondistended, mild tenderness to palpation right abdomen just lateral to umbilicus, no guarding, mild tenderness suprapubic area, no hepatomegaly    WBC 5.8  BMP - normal  Hepatic profile - normal    Right UQ U/S: no collections, no liver lesions    CT chest, abdomen, pelvis: no acute abnormalities, expected post op changes  CT brain: no acute abnormalities    A/P Abdominal pain/altered mental status/ h/o right breast cancer: much improved since arrival in ER. Scans show no worrisome findings, no signs of infection and no signs of recurrent cancer. Symptoms all likely side effects of verzenio. Discussed exam and findings with Dr. Shanita Mabry. Will have patient hold medication and they will contact tomorrow about adjustment of dose. Patient perked up after IV hydration and meds for her headache. Discharge home. Encourage fluids, po meds for headache. To follow up with Dr. Lavon Estevez.       Discussed plan with Roz MUJICA    [facility] Silverio Salazar MD  [phone]

## 2022-02-18 NOTE — ED PROVIDER NOTES
EKG Interpretation    Interpreted by emergency department physician  Time performed: 0983  Time read: 1536    Rhythm: Normal sinus rhythm  Ventricular Rate: 87  QRS Axis: 65  Ectopy: Sinus arrhythmia  Conduction: Normal sinus rhythm with sinus arrhythmia  ST Segments: normal  T Waves: normal  Q Waves: None    Other findings: Motion artifact but EKG is readable    Compared to EKG on: 9/8/2015 appears unchanged    Clinical Impression: Normal sinus rhythm with sinus arrhythmia. There is motion artifact but EKG is readable. This is unchanged from the previous EKG on 9/8/2015.     Taylor Mack, [address] DO  02/17/22 2225

## 2022-03-21 ENCOUNTER — HOSPITAL ENCOUNTER (OUTPATIENT)
Age: 32
Setting detail: OBSERVATION
Discharge: HOME | End: 2022-03-22
Attending: EMERGENCY MEDICINE | Admitting: STUDENT IN AN ORGANIZED HEALTH CARE EDUCATION/TRAINING PROGRAM
Payer: OTHER GOVERNMENT

## 2022-03-21 DIAGNOSIS — R07.9 CHEST PAIN, UNSPECIFIED TYPE: ICD-10-CM

## 2022-03-21 DIAGNOSIS — R55 NEAR SYNCOPE: Primary | ICD-10-CM

## 2022-03-21 DIAGNOSIS — R00.2 PALPITATIONS: ICD-10-CM

## 2022-03-21 LAB
ANION GAP SERPL CALCULATED.3IONS-SCNC: 13 MMOL/L (ref 3–16)
BASOPHILS ABSOLUTE: 0 K/UL (ref 0–0.2)
BASOPHILS RELATIVE PERCENT: 0.9 %
BUN BLDV-MCNC: 14 MG/DL (ref 7–20)
CALCIUM SERPL-MCNC: 9.6 MG/DL (ref 8.3–10.6)
CHLORIDE BLD-SCNC: 104 MMOL/L (ref 99–110)
CO2: 24 MMOL/L (ref 21–32)
CREAT SERPL-MCNC: 0.9 MG/DL (ref 0.6–1.1)
D DIMER: <200 NG/ML DDU (ref 0–229)
EKG ATRIAL RATE: 89 BPM
EKG DIAGNOSIS: NORMAL
EKG P AXIS: 52 DEGREES
EKG P-R INTERVAL: 160 MS
EKG Q-T INTERVAL: 362 MS
EKG QRS DURATION: 86 MS
EKG QTC CALCULATION (BAZETT): 440 MS
EKG R AXIS: 42 DEGREES
EKG T AXIS: 46 DEGREES
EKG VENTRICULAR RATE: 89 BPM
EOSINOPHILS ABSOLUTE: 0.1 K/UL (ref 0–0.6)
EOSINOPHILS RELATIVE PERCENT: 2 %
GFR AFRICAN AMERICAN: >60
GFR NON-AFRICAN AMERICAN: >60
GLUCOSE BLD-MCNC: 122 MG/DL (ref 70–99)
HCG QUALITATIVE: NEGATIVE
HCT VFR BLD CALC: 32.6 % (ref 36–48)
HEMOGLOBIN: 11.5 G/DL (ref 12–16)
LYMPHOCYTES ABSOLUTE: 1.1 K/UL (ref 1–5.1)
LYMPHOCYTES RELATIVE PERCENT: 24.1 %
MCH RBC QN AUTO: 33.2 PG (ref 26–34)
MCHC RBC AUTO-ENTMCNC: 35.1 G/DL (ref 31–36)
MCV RBC AUTO: 94.7 FL (ref 80–100)
MONOCYTES ABSOLUTE: 0.2 K/UL (ref 0–1.3)
MONOCYTES RELATIVE PERCENT: 5.4 %
NEUTROPHILS ABSOLUTE: 3 K/UL (ref 1.7–7.7)
NEUTROPHILS RELATIVE PERCENT: 67.6 %
PDW BLD-RTO: 15.8 % (ref 12.4–15.4)
PLATELET # BLD: 185 K/UL (ref 135–450)
PMV BLD AUTO: 7 FL (ref 5–10.5)
POTASSIUM REFLEX MAGNESIUM: 3.9 MMOL/L (ref 3.5–5.1)
RBC # BLD: 3.45 M/UL (ref 4–5.2)
SODIUM BLD-SCNC: 141 MMOL/L (ref 136–145)
TROPONIN: <0.01 NG/ML
WBC # BLD: 4.5 K/UL (ref 4–11)

## 2022-03-21 PROCEDURE — 85379 FIBRIN DEGRADATION QUANT: CPT

## 2022-03-21 PROCEDURE — 6370000000 HC RX 637 (ALT 250 FOR IP): Performed by: NURSE PRACTITIONER

## 2022-03-21 PROCEDURE — 93010 ELECTROCARDIOGRAM REPORT: CPT | Performed by: INTERNAL MEDICINE

## 2022-03-21 PROCEDURE — 93005 ELECTROCARDIOGRAM TRACING: CPT | Performed by: EMERGENCY MEDICINE

## 2022-03-21 PROCEDURE — 84484 ASSAY OF TROPONIN QUANT: CPT

## 2022-03-21 PROCEDURE — 99284 EMERGENCY DEPT VISIT MOD MDM: CPT

## 2022-03-21 PROCEDURE — 36415 COLL VENOUS BLD VENIPUNCTURE: CPT

## 2022-03-21 PROCEDURE — G0378 HOSPITAL OBSERVATION PER HR: HCPCS

## 2022-03-21 PROCEDURE — 94760 N-INVAS EAR/PLS OXIMETRY 1: CPT

## 2022-03-21 PROCEDURE — 84703 CHORIONIC GONADOTROPIN ASSAY: CPT

## 2022-03-21 PROCEDURE — 85025 COMPLETE CBC W/AUTO DIFF WBC: CPT

## 2022-03-21 PROCEDURE — 80048 BASIC METABOLIC PNL TOTAL CA: CPT

## 2022-03-21 PROCEDURE — 2580000003 HC RX 258: Performed by: NURSE PRACTITIONER

## 2022-03-21 RX ORDER — ONDANSETRON HYDROCHLORIDE 8 MG/1
8 TABLET, FILM COATED ORAL EVERY 8 HOURS PRN
COMMUNITY
Start: 2021-12-29

## 2022-03-21 RX ORDER — ONDANSETRON 4 MG/1
4 TABLET, ORALLY DISINTEGRATING ORAL EVERY 8 HOURS PRN
Status: DISCONTINUED | OUTPATIENT
Start: 2022-03-21 | End: 2022-03-22 | Stop reason: HOSPADM

## 2022-03-21 RX ORDER — ONDANSETRON 2 MG/ML
4 INJECTION INTRAMUSCULAR; INTRAVENOUS EVERY 6 HOURS PRN
Status: DISCONTINUED | OUTPATIENT
Start: 2022-03-21 | End: 2022-03-22 | Stop reason: HOSPADM

## 2022-03-21 RX ORDER — LETROZOLE 2.5 MG/1
2.5 TABLET, FILM COATED ORAL EVERY EVENING
COMMUNITY
Start: 2021-12-13

## 2022-03-21 RX ORDER — BUPROPION HYDROCHLORIDE 150 MG/1
150 TABLET ORAL NIGHTLY
Status: DISCONTINUED | OUTPATIENT
Start: 2022-03-21 | End: 2022-03-22 | Stop reason: HOSPADM

## 2022-03-21 RX ORDER — SODIUM CHLORIDE 9 MG/ML
25 INJECTION, SOLUTION INTRAVENOUS PRN
Status: DISCONTINUED | OUTPATIENT
Start: 2022-03-21 | End: 2022-03-22 | Stop reason: HOSPADM

## 2022-03-21 RX ORDER — SODIUM CHLORIDE 0.9 % (FLUSH) 0.9 %
5-40 SYRINGE (ML) INJECTION PRN
Status: DISCONTINUED | OUTPATIENT
Start: 2022-03-21 | End: 2022-03-22 | Stop reason: HOSPADM

## 2022-03-21 RX ORDER — POLYETHYLENE GLYCOL 3350 17 G/17G
17 POWDER, FOR SOLUTION ORAL DAILY PRN
Status: DISCONTINUED | OUTPATIENT
Start: 2022-03-21 | End: 2022-03-22 | Stop reason: HOSPADM

## 2022-03-21 RX ORDER — LETROZOLE 2.5 MG/1
2.5 TABLET, FILM COATED ORAL EVERY EVENING
Status: DISCONTINUED | OUTPATIENT
Start: 2022-03-21 | End: 2022-03-22 | Stop reason: HOSPADM

## 2022-03-21 RX ORDER — SODIUM CHLORIDE 0.9 % (FLUSH) 0.9 %
5-40 SYRINGE (ML) INJECTION EVERY 12 HOURS SCHEDULED
Status: DISCONTINUED | OUTPATIENT
Start: 2022-03-21 | End: 2022-03-22 | Stop reason: HOSPADM

## 2022-03-21 RX ORDER — ACETAMINOPHEN 325 MG/1
650 TABLET ORAL EVERY 6 HOURS PRN
Status: DISCONTINUED | OUTPATIENT
Start: 2022-03-21 | End: 2022-03-22 | Stop reason: HOSPADM

## 2022-03-21 RX ORDER — ACETAMINOPHEN 650 MG/1
650 SUPPOSITORY RECTAL EVERY 6 HOURS PRN
Status: DISCONTINUED | OUTPATIENT
Start: 2022-03-21 | End: 2022-03-22 | Stop reason: HOSPADM

## 2022-03-21 RX ADMIN — SODIUM CHLORIDE, PRESERVATIVE FREE 10 ML: 5 INJECTION INTRAVENOUS at 22:33

## 2022-03-21 RX ADMIN — BUPROPION HYDROCHLORIDE 150 MG: 150 TABLET, EXTENDED RELEASE ORAL at 22:29

## 2022-03-21 RX ADMIN — ACETAMINOPHEN 650 MG: 325 TABLET ORAL at 22:29

## 2022-03-21 ASSESSMENT — PAIN DESCRIPTION - DESCRIPTORS: DESCRIPTORS: HEAVINESS

## 2022-03-21 ASSESSMENT — PAIN DESCRIPTION - LOCATION: LOCATION: CHEST;ARM

## 2022-03-21 ASSESSMENT — PAIN SCALES - GENERAL
PAINLEVEL_OUTOF10: 8
PAINLEVEL_OUTOF10: 0
PAINLEVEL_OUTOF10: 5
PAINLEVEL_OUTOF10: 0

## 2022-03-21 ASSESSMENT — PAIN DESCRIPTION - PAIN TYPE: TYPE: ACUTE PAIN

## 2022-03-21 ASSESSMENT — PAIN - FUNCTIONAL ASSESSMENT: PAIN_FUNCTIONAL_ASSESSMENT: 0-10

## 2022-03-21 ASSESSMENT — PAIN DESCRIPTION - ORIENTATION: ORIENTATION: LEFT

## 2022-03-21 NOTE — ED PROVIDER NOTES
vomiting. Neuro: Lightheaded/dizzy, felt like she was going to pass out. Except as noted above the remainder of the review of systems was reviewed and negative.        PAST MEDICAL HISTORY     Past Medical History:   Diagnosis Date    Breast CA [facility])     right breast    Depression     Prolonged emergence from general anesthesia     Thyroid disease     hypothyroid         SURGICAL HISTORY       Past Surgical History:   Procedure Laterality Date    AXILLARY SURGERY Right 2021    RIGHT AXILLARY NODE BIOPSY performed by Francine Franks MD at [address] Bilateral 3/2/2020    BILATERAL FIRST STAGE BREAST RECONSTRUCTION performed by Rishi Turner MD at [address] Left 2020    INSERTION OF TISUUE EXPANDER LEFT SIDE CHEST WALL performed by Rishi Turner MD at [address] Bilateral 2020    BILATERAL SECOND STAGE BREAST RECONSTRUCTION AND REMOVAL OF PORTACATH performed by Rishi Turner MD at [address]      core bx     SECTION      CHOLECYSTECTOMY  2016    ENDOMETRIAL ABLATION      HYSTERECTOMY Bilateral 2022    DAVINCI TOTAL LAPAROSCOPIC HYSTERECTOMY, BILATERAL SALPINGO OOPHORECTOMY, SCAR REVISION, CYSTOTOMY REPAIR performed by Lluvia Whyte MD at [address] [facility] Bilateral 3/2/2020    BILATERAL SIMPLE MASTECTOMY, RIGHT SENTINEL NODE BIOPSY (BLUE DYE ONLY)/ performed by Francine Franks MD at [address]      left tissue expander removed    PORT SURGERY N/A 10/16/2019    SINGLE LUMEN PORT-A-CATHETER INSERTION LEFT SUBCLAVIAN performed by Francine Franks MD at [address] Left 2022    REVISION OF LEFT MASTECTOMY SCAR performed by Francine Franks MD at [address]       Current Discharge Medication List      CONTINUE these medications which have NOT CHANGED Physical Activity:     Days of Exercise per Week: Not on file    Minutes of Exercise per Session: Not on file   Stress:     Feeling of Stress : Not on file   Social Connections:     Frequency of Communication with Friends and Family: Not on file    Frequency of Social Gatherings with Friends and Family: Not on file    Attends Islam Services: Not on file    Active Member of [address] [place] [address] or Organizations: Not on file    Attends Club or Organization Meetings: Not on file    Marital Status: Not on file   Intimate Partner Violence:     Fear of Current or Ex-Partner: Not on file    Emotionally Abused: Not on file    Physically Abused: Not on file    Sexually Abused: Not on file   Housing Stability:     Unable to Pay for Housing in the Last Year: Not on file    Number of [place] in the Last Year: Not on file    Unstable Housing in the Last Year: Not on file         PHYSICAL EXAM    (up to 7 for level 4, 8 or more for level 5)     ED Triage Vitals   BP Temp Temp Source Pulse Resp SpO2 Height Weight   03/21/22 1504 03/21/22 1508 03/21/22 1508 03/21/22 1504 03/21/22 1504 03/21/22 1504 03/21/22 1504 03/21/22 1504   117/75 97.7 °F (36.5 °C) Temporal 88 16 99 % 5' 4\" (1.626 m) 159 lb 13.3 oz (72.5 kg)       General: Alert female no acute distress. Head: Atraumatic and normocephalic. Eyes: No conjunctival injection. No pallor. Pupils equal round reactive. ENT: Elby Esvin is clear. Oropharynx is moist without erythema. Neck: Supple without adenopathy, nontender. Heart: Regular rate and rhythm. No murmurs or gallops noted. Lungs: Breath sounds equal bilaterally and clear. Abdomen: Soft, nondistended, nontender. Musculoskeletal: No lower extremity edema. No calf tenderness. Intact symmetrical distal pulses. Skin: Warm and dry, good turgor. No pallor or cyanosis. No diaphoresis. Neuro: Awake, alert, oriented. Symmetrical reactive pupils. Intact extraocular movements. No facial asymmetry. Symmetrical motor function. DIFFERENTIAL DIAGNOSIS   Differential includes but is not limited to hypoglycemia, anemia, dehydration, hypotension, pulmonary embolism, bradycardia arrhythmia, tachyarrhythmia, other. DIAGNOSTIC RESULTS     EKG: All EKG's are interpreted by Mary López MD in the absence of a cardiologist.    EKG: Normal sinus rhythm, rate of 89, no acute changes. Rhythm strip shows a sinus rhythm with a rate of 89, OK interval 160 ms, QRS of 86 ms with no other ectopy as interpreted by me. No significant change compared to 2/17/2022. RADIOLOGY:   Non-plain film images such as CT, Ultrasound and MRI are read by the radiologist. Plain radiographic images are visualized and preliminarily interpreted Mary López MD with the below findings:      Interpretation per the Radiologist below, if available at the time of this note:    No orders to display         ED BEDSIDE ULTRASOUND:   Performed by ED Physician - none    LABS:  Labs Reviewed   CBC WITH AUTO DIFFERENTIAL - Abnormal; Notable for the following components:       Result Value    RBC 3.45 (*)     Hemoglobin 11.5 (*)     Hematocrit 32.6 (*)     RDW 15.8 (*)     All other components within normal limits   BASIC METABOLIC PANEL W/ REFLEX TO MG FOR LOW K - Abnormal; Notable for the following components:    Glucose 122 (*)     All other components within normal limits   HCG, SERUM, QUALITATIVE   TROPONIN   D-DIMER, QUANTITATIVE   TROPONIN   TROPONIN   TSH WITH REFLEX       All other labs were within normal range or not returned as of this dictation. EMERGENCY DEPARTMENT COURSE and DIFFERENTIAL DIAGNOSIS/MDM:   Vitals:    Vitals:    03/21/22 2048 03/21/22 2116 03/21/22 2143 03/21/22 2248   BP: 114/85 (!) 124/99 120/68    Pulse: 81 80 85    Resp: 14 11 18 18   Temp:  98.4 °F (36.9 °C) 98.1 °F (36.7 °C)    TempSrc:  Oral Oral    SpO2: 99% 100% 100% 97%   Weight:       Height:            This patient presents with an episode of lightheadedness/near syncope as described above. As noted above she had a similar episode about 3 weeks ago where she actually did pass out. She had documented tachycardia with a rate in the 120s. She had some chest discomfort. She does have a history of breast cancer. She is on oral chemotherapy. Her heart rate was normal here. Her vital signs were stable. Her oxygen saturation was normal.  Her D-dimer was normal.  Have a low index of suspicion for PE. She has not been tachycardic or bradycardic. I discussed the case with oncology, Dr. Nery Wilkerson. I discussed treatment options with the patient and her . In light of this being the second episode in 3 weeks with no apparent cause for her syncope, near syncope, chest discomfort we will admit to an observation bed for monitoring and further evaluation. The hospitalist was consulted for admission. Test results, diagnosis, and treatment plan were discussed the patient. She understands treatment plan need for admission and is agreeable. CONSULTS:  IP CONSULT TO ONCOLOGY  IP CONSULT TO HOSPITALIST    PROCEDURES:  None    FINAL IMPRESSION      1. Near syncope    2. Palpitations    3. Chest pain, unspecified type          DISPOSITION/PLAN   DISPOSITION Admitted 03/21/2022 08:53:37 PM      PATIENT REFERRED TO:  No follow-up provider specified.     DISCHARGE MEDICATIONS:  Current Discharge Medication List          (Please note that portions of this note were completed with a voice recognition program.  Efforts were made to edit the dictations but occasionally words are mis-transcribed.)    Quinn Kehr, MD  Attending Emergency Physician        Patricia Olivia MD  03/21/22 0413

## 2022-03-21 NOTE — ED NOTES
Pt given meal per request. She denies any further needs at this time.       Avinash Mendez, RN  03/21/22 0461

## 2022-03-22 ENCOUNTER — TELEPHONE (OUTPATIENT)
Dept: CARDIOLOGY CLINIC | Age: 32
End: 2022-03-22

## 2022-03-22 VITALS
HEIGHT: 64 IN | BODY MASS INDEX: 27.21 KG/M2 | SYSTOLIC BLOOD PRESSURE: 108 MMHG | HEART RATE: 76 BPM | TEMPERATURE: 98.3 F | RESPIRATION RATE: 16 BRPM | OXYGEN SATURATION: 98 % | WEIGHT: 159.4 LBS | DIASTOLIC BLOOD PRESSURE: 72 MMHG

## 2022-03-22 LAB
FERRITIN: 246.2 NG/ML (ref 15–150)
FOLATE: 19.66 NG/ML (ref 4.78–24.2)
IRON SATURATION: 32 % (ref 15–50)
IRON: 103 UG/DL (ref 37–145)
TOTAL IRON BINDING CAPACITY: 318 UG/DL (ref 260–445)
TROPONIN: <0.01 NG/ML
TROPONIN: <0.01 NG/ML
TSH REFLEX: 2.16 UIU/ML (ref 0.27–4.2)
VITAMIN B-12: 207 PG/ML (ref 211–911)

## 2022-03-22 PROCEDURE — 2580000003 HC RX 258: Performed by: NURSE PRACTITIONER

## 2022-03-22 PROCEDURE — 82746 ASSAY OF FOLIC ACID SERUM: CPT

## 2022-03-22 PROCEDURE — G0378 HOSPITAL OBSERVATION PER HR: HCPCS

## 2022-03-22 PROCEDURE — 82607 VITAMIN B-12: CPT

## 2022-03-22 PROCEDURE — 83550 IRON BINDING TEST: CPT

## 2022-03-22 PROCEDURE — 82728 ASSAY OF FERRITIN: CPT

## 2022-03-22 PROCEDURE — 84443 ASSAY THYROID STIM HORMONE: CPT

## 2022-03-22 PROCEDURE — 83540 ASSAY OF IRON: CPT

## 2022-03-22 PROCEDURE — 99204 OFFICE O/P NEW MOD 45 MIN: CPT | Performed by: INTERNAL MEDICINE

## 2022-03-22 PROCEDURE — 36415 COLL VENOUS BLD VENIPUNCTURE: CPT

## 2022-03-22 PROCEDURE — 84484 ASSAY OF TROPONIN QUANT: CPT

## 2022-03-22 RX ADMIN — SODIUM CHLORIDE, PRESERVATIVE FREE 10 ML: 5 INJECTION INTRAVENOUS at 09:23

## 2022-03-22 ASSESSMENT — PAIN SCALES - GENERAL: PAINLEVEL_OUTOF10: 0

## 2022-03-22 NOTE — H&P
Hospital Medicine History & Physical      PCP: Luca Augustine    Date of Admission: 3/21/2022    Date of Service: Pt seen/examined on 3/21/2022 and Admitted to Inpatient Placed in Observation. Chief Complaint:  Felt faint like going to pass out      History Of Present Illness: The patient is a 27 y.o. female who presents to [facility] with PMHx: Hypothyroid, breast cancer, hysterectomy, double mastectomy. Recurrent breast cancer recurrence with richelle involvement, November 2021. Receiving oral chemotherapy   Oncologist: Dr. 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, she began to feel heavy, weak racing heart, blurred vision and she was laid down. She states that her visual field went completely black but she could hear people talking. She reports an episode on February 17 for which she was seen in the ED and worked up where she actually lost consciousness and was essentially fairly unresponsive for several hours. She had a complete work-up done. Patient states while at work when they laid her down her heart rate had gone up to 126 but her blood pressure was 407 systolic. Patient reports that after that episode February 17 her oral chemo dose was cut in half, and she is now currently taking 100 mg a day. She had a CT head CT chest and CT abdomen and pelvis with that episode with no indication of acute findings. ED workup: Consults Dr. Rudy Rubin, he will see the patient in the a.m. Patient was noted to be tachycardic without hypotension, afebrile. D-dimer was less than 200, EKG unremarkable, troponin is less than 0.01. Pregnancy was negative obviously. Her H&H is stable: 11.5/32. 6.   And no evidence of leukocytosis or leukopenia. On my exam: Patient is sitting up, awake alert and oriented. No evidence of acute distress. No slurred speech. No ataxia. No diaphoresis. No evidence of tachycardia or hypotension at present. No respiratory abnormality noted. No evidence of facial palsy, proptosis or lid lag. No evidence of anisocoria. No evidence of speech deficit. No evidence of hemiparesis or paralysis. Patient reports at this point in time other than having a mild headache she feels fine.       Past Medical History:        Diagnosis Date    Breast CA ([address]     right breast    Depression     Prolonged emergence from general anesthesia     Thyroid disease     hypothyroid       Past Surgical History:        Procedure Laterality Date    AXILLARY SURGERY Right 2021    RIGHT AXILLARY NODE BIOPSY performed by Susan England MD at [address] [facility] Bilateral 3/2/2020    BILATERAL FIRST STAGE BREAST RECONSTRUCTION performed by Antonio Andersen MD at [facility] Left 2020    INSERTION OF TISUUE EXPANDER LEFT SIDE CHEST WALL performed by Antonio Andersen MD at [address] Bilateral 2020    BILATERAL SECOND STAGE BREAST RECONSTRUCTION AND REMOVAL OF PORTACATH performed by Antonio Andersen MD at [address] [place], [place] [address]e S      core bx     SECTION      CHOLECYSTECTOMY  2016    ENDOMETRIAL ABLATION      HYSTERECTOMY Bilateral 2022    DAVINCI TOTAL LAPAROSCOPIC HYSTERECTOMY, BILATERAL SALPINGO OOPHORECTOMY, SCAR REVISION, CYSTOTOMY REPAIR performed by Charmayne Martens, MD at [address] [facility] Bilateral 3/2/2020    BILATERAL SIMPLE MASTECTOMY, RIGHT SENTINEL NODE BIOPSY (BLUE DYE ONLY)/ performed by Susan England MD at [address] [facility] [address]      left tissue expander removed    PORT SURGERY N/A 10/16/2019    SINGLE LUMEN PORT-A-CATHETER INSERTION LEFT SUBCLAVIAN performed by Romayne Matin, MD at [address] Left 1/25/2022    REVISION OF LEFT MASTECTOMY SCAR performed by Romayne Matin, MD at [address]         Medications Prior to Admission:    Prior to Admission medications    Medication Sig Start Date End Date Taking? Authorizing Provider   Abemaciclib 100 MG TABS Take 100 mg by mouth in the morning and at bedtime Verzenio 2/18/22  Yes Historical Provider, MD   letrozole [facility]) 2.5 MG tablet Take 2.5 mg by mouth every evening 12/13/21  Yes Historical Provider, MD   econazole nitrate 1 % cream Apply topically daily Apply topically daily. Yes Historical Provider, MD   ondansetron (ZOFRAN) 8 MG tablet Take 8 mg by mouth every 8 hours as needed 12/29/21   Historical Provider, MD   ibuprofen (ADVIL;MOTRIN) 400 MG tablet Take 2 tablets by mouth every 6 hours as needed for Pain 1/27/22   Luisito Martinez MD   Probiotic Product (PROBIOTIC-10 PO) Take 1 capsule by mouth every evening     Historical Provider, MD   Vitamin D (CHOLECALCIFEROL) 25 MCG (1000 UT) TABS tablet Take 1,000 Units by mouth every evening     Historical Provider, MD   buPROPion (WELLBUTRIN XL) 150 MG extended release tablet Take 150 mg by mouth nightly    Historical Provider, MD       Allergies:  Adhesive tape, Betadine [povidone iodine], and No known allergies    Social History:  The patient currently lives at home. TOBACCO:   reports that she has never smoked. She has never used smokeless tobacco.  ETOH:   reports current alcohol use. Family History:  Reviewed in detail and negative for DM, Early CAD, Cancer, CVA. Positive as follows:        Problem Relation Age of Onset    Cancer Paternal Grandmother         breast    Thyroid Disease Paternal Grandmother        REVIEW OF SYSTEMS:   Positive for mild headache and as noted in the HPI. All other systems reviewed and negative.     PHYSICAL EXAM:    /68   Pulse 85   Temp 98.1 °F (36.7 °C) (Oral)   Resp 18   Ht 5' 4\" (1.626 m)   Wt 159 lb 13.3 oz (72.5 kg)   LMP 11/01/2019   SpO2 100%   BMI 27.44 kg/m²     General appearance: No apparent distress appears stated age and cooperative. HEENT Normal cephalic, atraumatic without obvious deformity. Pupils equal, round, and reactive to light. Extra ocular muscles intact. Conjunctivae/corneas clear. Neck: Supple, No jugular venous distention/bruits. Trachea midline without thyromegaly or adenopathy with full range of motion. Lungs: Clear to auscultation, bilaterally without Rales/Wheezes/Rhonchi with good respiratory effort. Heart: Regular rate and rhythm with Normal S1/S2 without murmurs, rubs or gallops, point of maximum impulse non-displaced  Abdomen: Soft, non-tender or non-distended without rigidity or guarding and positive bowel sounds all four quadrants. Extremities: No clubbing, cyanosis, or edema bilaterally. Full range of motion without deformity and normal gait intact. Skin: Skin color, texture, turgor normal.  No rashes or lesions. Neurologic: Alert and oriented X 3, neurovascularly intact with sensory/motor intact upper extremities/lower extremities, bilaterally. Cranial nerves: II-XII intact, grossly non-focal.  Mental status: Alert, oriented, thought content appropriate. Capillary Refill: Acceptable  < 3 seconds  Peripheral Pulses: +3 Easily felt, not easily obliterated with pressure      CXR:  I have reviewed the CXR with the following interpretation: N/A  EKG:  I have reviewed the EKG with the following interpretation: Sinus rhythm: Narrow complex. Rate 89, ND interval 160, QRS 8 6, . No acute changes when compared to February 17, 2022.     CBC   Recent Labs     03/21/22  1830   WBC 4.5   HGB 11.5*   HCT 32.6*         RENAL  Recent Labs     03/21/22  1830      K 3.9      CO2 24   BUN 14   CREATININE 0.9     LFT'S  No results for input(s): AST, ALT, ALB, BILIDIR, BILITOT, ALKPHOS in the last 72 hours.  COAG  No results for input(s): INR in the last 72 hours. CARDIAC ENZYMES  Recent Labs     03/21/22  1830   TROPONINI <0.01       U/A:    Lab Results   Component Value Date    COLORU YELLOW 02/17/2022    CLARITYU Clear 02/17/2022    SPECGRAV 1.007 02/17/2022    LEUKOCYTESUR Negative 02/17/2022    BLOODU Negative 02/17/2022    GLUCOSEU Negative 02/17/2022       ABG  No results found for: CRC0ERD, BEART, P7WAADWE, PHART, THGBART, CRH0YAH, PO2ART, ANF7TBG        Active Hospital Problems    Diagnosis Date Noted    Near syncope [R55] 03/21/2022         PHYSICIANS CERTIFICATION:    I certify that Hilaria Garcia is expected to be hospitalized for less than 2 midnights based on the following assessment and plan:      ASSESSMENT/PLAN:  Near syncope: DDx: Arrhythmia, hypotension, medication/chemo side effect, dehydration, neurogenic, orthostasis  No evidence of anemia, no evidence of sepsis, no evidence of leukocytosis, no evidence of hypotension. No evidence of STEMI. D-dimer is less than 200. No evidence of electrolyte derangement. No evidence of metabolic acidosis. No evidence of hypo- or hyperglycemia. No evidence of uremia. Orthostatics  Telemetry  Hold oral chemotherapy agent  Repeat labs in a.m.  TSH in a.m. Troponins < 0.01-> trend  CT head, CT chest and CT abdomen and pelvis were already completed on February 17 for syncope work-up with no evidence of acute findings. Therefore I did not pursue repeat of the studies. Echocardiogram in a.m. ECHO: 2019:   Summary   Normal left ventricle size, wall thickness, and systolic function with an   estimated ejection fraction of 55-60%. No regional wall motion abnormalities   are seen. Normal diastolic function. Normal function of all valves  Breast cancer: Oncologist: Dr. Mika Bailey will see in a.m. was consulted from ED      DVT Prophylaxis: Lovenox  Diet: ADULT DIET;  Regular  Code Status: Full Code  PT/OT Eval Status: Independent    Dispo - admit, obs       Markie Stevens, APRN - CNP    Thank you Regan Fry for the opportunity to be involved in this patient's care. If you have any questions or concerns please feel free to contact me at [phone]. This dictation was performed with a verbal recognition program (DRAGON) and it was checked for errors. It is possible that there are still dictated errors within this office note. If so, please bring any errors to my attention for an addendum. All efforts were made to ensure that this office note is accurate.

## 2022-03-22 NOTE — PROGRESS NOTES
D: Pt. Arrived from the ED, alert and oriented, VSS without complaints of dizziness   A: Admission started and Pt. oriented to 4W. Encouraged to call for assist prior to getting up.

## 2022-03-22 NOTE — PROGRESS NOTES
4 Eyes Skin Assessment     NAME:  Dylan Mauricio OF BIRTH:  1990  MEDICAL RECORD NUMBER:  [id number]    The patient is being assess for  Admission    I agree that 2 RN's have performed a thorough Head to Toe Skin Assessment on the patient. ALL assessment sites listed below have been assessed. Areas assessed by both nurses:    Head, Face, Ears, Shoulders, Back, Chest, Arms, Elbows, Hands, Sacrum. Buttock, Coccyx, Ischium and Legs. Feet and Heels        Does the Patient have a Wound?  No noted wound(s)       Deniz Prevention initiated:  No   Wound Care Orders initiated:  No    Pressure Injury (Stage 3,4, Unstageable, DTI, NWPT, and Complex wounds) if present place consult order under [de-identified] No    New and Established Ostomies if present place consult order under : No      Nurse 1 eSignature: Electronically signed by Ruth Dacosta RN on 3/22/22 at 4:02 AM EDT    **SHARE this note so that the co-signing nurse is able to place an eSignature**    Nurse 2 eSignature: {Esignature:[id number]}

## 2022-03-22 NOTE — DISCHARGE SUMMARY
Hospital Medicine Discharge Summary    Patient ID: Denice Pereira      Patient's PCP: Nasreen Fritz Date: 3/21/2022     Discharge Date: 3/22/2022      Admitting Provider: Sudhir Gilliland DO     Discharge Provider: Nestor Lovelace MD     Discharge Diagnoses:  Vasovagal syncope  Breast cancer  Hypothyroidism       The patient was seen and examined on day of discharge and this discharge summary is in conjunction with any daily progress note from day of discharge. Hospital Course: Patient is a 49-year-old female admitted to the hospital with weakness racing heart and syncope. Admitted here treated with IV fluids. Ruled out for an MI with serial enzymes. Seen by cardiology felt to likely have had vasovagal syncopal episode. Seen by her oncologist and was not felt to be related to her current chemotherapy. Advised to drink plenty of fluids. Did go home on a Holter monitor        Physical Exam Performed:     /72   Pulse 76   Temp 98.3 °F (36.8 °C) (Oral)   Resp 16   Ht 5' 4\" (1.626 m)   Wt 159 lb 6.4 oz (72.3 kg)   LMP 11/01/2019   SpO2 98%   BMI 27.36 kg/m²       General appearance:  No apparent distress, appears stated age and cooperative. HEENT:  Normal cephalic, atraumatic without obvious deformity. Pupils equal, round, and reactive to light. Extra ocular muscles intact. Conjunctivae/corneas clear. Neck: Supple, with full range of motion. No jugular venous distention. Trachea midline. Respiratory:  Normal respiratory effort. Clear to auscultation, bilaterally without Rales/Wheezes/Rhonchi. Cardiovascular:  Regular rate and rhythm with normal S1/S2 without murmurs, rubs or gallops. Abdomen: Soft, non-tender, non-distended with normal bowel sounds. Musculoskeletal:  No clubbing, cyanosis or edema bilaterally. Full range of motion without deformity. Skin: Skin color, texture, turgor normal.  No rashes or lesions.   Neurologic:  Neurovascularly intact without any focal sensory/motor deficits. Cranial nerves: II-XII intact, grossly non-focal.  Psychiatric:  Alert and oriented, thought content appropriate, normal insight  Capillary Refill: Brisk,< 3 seconds   Peripheral Pulses: +2 palpable, equal bilaterally       Labs: For convenience and continuity at follow-up the following most recent labs are provided:      CBC:    Lab Results   Component Value Date    WBC 4.5 03/21/2022    HGB 11.5 03/21/2022    HCT 32.6 03/21/2022     03/21/2022       Renal:    Lab Results   Component Value Date     03/21/2022    K 3.9 03/21/2022     03/21/2022    CO2 24 03/21/2022    BUN 14 03/21/2022    CREATININE 0.9 03/21/2022    CALCIUM 9.6 03/21/2022    PHOS 4.1 11/26/2019         Significant Diagnostic Studies    Radiology:   No orders to display          Consults:     IP CONSULT TO ONCOLOGY  IP CONSULT TO HOSPITALIST  IP CONSULT TO CARDIOLOGY    Disposition:  home     Condition at Discharge: Stable    Discharge Instructions/Follow-up:  Meka Springer MD  Oncologist Denisha Chiu MD      Code Status:  full  Activity: activity as tolerated    Diet: regular diet      Discharge Medications:     Discharge Medication List as of 3/22/2022  2:48 PM           Details   Abemaciclib 100 MG TABS Take 100 mg by mouth in the morning and at bedtime VerzenioHistorical Med      letrozole (FEMARA) 2.5 MG tablet Take 2.5 mg by mouth every eveningHistorical Med      ondansetron (ZOFRAN) 8 MG tablet Take 8 mg by mouth every 8 hours as neededHistorical Med      econazole nitrate 1 % cream Apply topically daily Apply topically daily. , Topical, DAILY, Historical Med      ibuprofen (ADVIL;MOTRIN) 400 MG tablet Take 2 tablets by mouth every 6 hours as needed for Pain, Disp-60 tablet, R-3Print      Probiotic Product (PROBIOTIC-10 PO) Take 1 capsule by mouth every evening Historical Med      Vitamin D (CHOLECALCIFEROL) 25 MCG (1000 UT) TABS tablet Take 1,000 Units by mouth every evening

## 2022-03-22 NOTE — PROGRESS NOTES
Patient has discharge order per MD order. IV removed intact. Discharge instructions given to patient and  both verbalized agreement.

## 2022-03-22 NOTE — TELEPHONE ENCOUNTER
Patient currently in [facility], room 4130. Needs 30 day event monitor today prior to discharge.

## 2022-03-22 NOTE — TELEPHONE ENCOUNTER
Monitor taken to hospital to pt Rm 4130. Instructions given to pt, v/u. Pt has been enrolled via [employer] for 30 day monitor.

## 2022-03-22 NOTE — CARE COORDINATION
INITIAL CASE MANAGEMENT ASSESSMENT    Reviewed chart, met with patient to assess possible discharge needs. Explained Case Management role/services. Living Situation: verified address, lives with  in a 2 story house with 1 HANS    ADLs: independent     Transportation: active , will have a ride home at dc     Medications: no barriers, uses [facility] on [address]    PCP: Ashley Jacobs    PLAN/COMMENTS: denies any needs, plans to return home at dc, will have a ride    CM provided contact information for patient or family to call with any questions. CM will follow and assist as needed.     Alex Payan RN, BSN,   [phone]    Electronically signed by Alex Payan RN on 3/22/2022 at 1:49 PM

## 2022-03-22 NOTE — CONSULTS
Referring Physician: Dr. Dea Ellsworth  Reason for Consultation: \"Tachycardia-? Does pt need an outpatient event monitor or other eval?\"  Chief Complaint: Palpitations and I almost passed out    Subjective:   History of Present Illness:  Milla Pink is a 27 y.o. patient who presented to the hospital with complaints of near syncope and palpitations. The patient reports a syncopal episode several weeks ago. She says that her oral chemotherapy dose was reduced at that time secondary to the syncopal spell but other notes indicate it was secondary to diarrhea. Regardless, the patient describes a prodrome of having nausea, mild diaphoresis, and feeling lightheaded. She endorsed associated palpitations that she described as a \"rapid\" heart rate. She checked a pulse ox and her heart rate was in the 110s. She did not \"pass out\" with this most recent event but states that she would have if she did not sit or lay down. She also had an episode of syncope while she was pregnant several years ago. In general, she denies exertional chest pain, shortness of breath, PND, orthopnea, or lower extremity edema. She reports compliance with her medications. Several minutes after the event, she felt normal and has not had any episodes overnight. There have been no arrhythmias on telemetry. Past Medical History:   has a past medical history of Breast CA ([address] Depression, Prolonged emergence from general anesthesia, and Thyroid disease. Surgical History:   has a past surgical history that includes [place] tooth extraction;  section; Cholecystectomy (2016); Breast surgery; [facility] Surgery (N/A, 10/16/2019); Mastectomy (Bilateral, 3/2/2020); Breast enhancement surgery (Bilateral, 3/2/2020); other surgical history; Breast enhancement surgery (Left, 2020); Breast reconstruction (Bilateral, 2020); Endometrial ablation; Axillary Surgery (Right, 2021);  Hysterectomy (Bilateral, 2022); and Scar Revision (Left, 1/25/2022). Social History:   reports that she has never smoked. She has never used smokeless tobacco. She reports current alcohol use. She reports that she does not use drugs. Family History:  family history includes Cancer in her paternal grandmother; Thyroid Disease in her paternal grandmother. Home Medications:  Were reviewed and are listed in nursing record and/or below  Prior to Admission medications    Medication Sig Start Date End Date Taking? Authorizing Provider   Abemaciclib 100 MG TABS Take 100 mg by mouth in the morning and at bedtime Verzenio 2/18/22  Yes Historical Provider, MD   letrozole [facility]) 2.5 MG tablet Take 2.5 mg by mouth every evening 12/13/21  Yes Historical Provider, MD   econazole nitrate 1 % cream Apply topically daily Apply topically daily.    Yes Historical Provider, MD   ondansetron (ZOFRAN) 8 MG tablet Take 8 mg by mouth every 8 hours as needed 12/29/21   Historical Provider, MD   ibuprofen (ADVIL;MOTRIN) 400 MG tablet Take 2 tablets by mouth every 6 hours as needed for Pain 1/27/22   Marcy Long MD   Probiotic Product (PROBIOTIC-10 PO) Take 1 capsule by mouth every evening     Historical Provider, MD   Vitamin D (CHOLECALCIFEROL) 25 MCG (1000 UT) TABS tablet Take 1,000 Units by mouth every evening     Historical Provider, MD   buPROPion (WELLBUTRIN XL) 150 MG extended release tablet Take 150 mg by mouth nightly    Historical Provider, MD        CURRENT Medications:  buPROPion (WELLBUTRIN XL) extended release tablet 150 mg, Nightly  sodium chloride flush 0.9 % injection 5-40 mL, 2 times per day  sodium chloride flush 0.9 % injection 5-40 mL, PRN  0.9 % sodium chloride infusion, PRN  enoxaparin (LOVENOX) injection 40 mg, Daily  ondansetron (ZOFRAN-ODT) disintegrating tablet 4 mg, Q8H PRN   Or  ondansetron (ZOFRAN) injection 4 mg, Q6H PRN  polyethylene glycol (GLYCOLAX) packet 17 g, Daily PRN  acetaminophen (TYLENOL) tablet 650 mg, Q6H PRN Or  acetaminophen (TYLENOL) suppository 650 mg, Q6H PRN  [Held by provider] Abemaciclib TABS 100 mg, BID  [Held by provider] letrozole (FEMARA) tablet 2.5 mg, QPM    Allergies:  Adhesive tape, Betadine [povidone iodine], and No known allergies     Review of Systems:   · Constitutional: no unanticipated weight loss. There's been no change in energy level, sleep pattern, or activity level. No fevers, chills. · Eyes: No visual changes or diplopia. No scleral icterus. · ENT: No Headaches, hearing loss or vertigo. No mouth sores or sore throat. · Cardiovascular: as reviewed in HPI  · Respiratory: No cough or wheezing, no sputum production. No hemoptysis. · Gastrointestinal: No abdominal pain, appetite loss, blood in stools. No change in bowel or bladder habits. · Genitourinary: No dysuria, trouble voiding, or hematuria. · Musculoskeletal:  No gait disturbance, no joint complaints. · Integumentary: No rash or pruritis. · Neurological: No headache, diplopia, change in muscle strength, numbness or tingling. · Psychiatric: No anxiety or depression. · Endocrine: No temperature intolerance. No excessive thirst, fluid intake, or urination. No tremor. · Hematologic/Lymphatic: No abnormal bruising or bleeding, blood clots or swollen lymph nodes. · Allergic/Immunologic: No nasal congestion or hives. Objective:   PHYSICAL EXAM:    Vitals:    03/22/22 0830   BP: 108/72   Pulse: 76   Resp: 16   Temp: 98.3 °F (36.8 °C)   SpO2: 98%    Weight: 159 lb 6.4 oz (72.3 kg)       General Appearance:  Alert, cooperative, no distress, appears stated age. Head:  Normocephalic, without obvious abnormality, atraumatic. Eyes:  Pupils equal and round. No scleral icterus. Mouth: Moist mucosa, no pharyngeal erythema. Nose: Nares normal. No drainage or sinus tenderness. Neck: Supple, symmetrical, trachea midline. No adenopathy. No tenderness/mass/nodules. No carotid bruit or elevated JVD.    Lungs:   Clear to auscultation bilaterally, respirations unlabored. No wheeze, rales, or rhonchi. Chest Wall:  No tenderness or deformity. Heart:  Regular rate. S1/S2 normal. No murmur, rub, or gallop. Abdomen:   Soft, non-tender, bowel sounds active. Musculoskeletal: No muscle wasting or digital clubbing. Extremities: Extremities normal, atraumatic. No cyanosis or edema. Pulses: 2+ radial and carotid pulses, symmetric. Skin: No rashes or lesions. Pysch: Normal mood and affect. Alert and oriented x 4. Neurologic: Normal gross motor and sensory exam.       Labs     CBC:   Lab Results   Component Value Date    WBC 4.5 03/21/2022    RBC 3.45 03/21/2022    HGB 11.5 03/21/2022    HCT 32.6 03/21/2022    MCV 94.7 03/21/2022    RDW 15.8 03/21/2022     03/21/2022     CMP:  Lab Results   Component Value Date     03/21/2022    K 3.9 03/21/2022     03/21/2022    CO2 24 03/21/2022    BUN 14 03/21/2022    CREATININE 0.9 03/21/2022    GFRAA >60 03/21/2022    AGRATIO 1.5 11/04/2021    LABGLOM >60 03/21/2022    GLUCOSE 122 03/21/2022    PROT 7.3 02/17/2022    CALCIUM 9.6 03/21/2022    BILITOT 0.5 02/17/2022    ALKPHOS 101 02/17/2022    AST 16 02/17/2022    ALT 16 02/17/2022     PT/INR:  No results found for: PTINR  HgBA1c:No results found for: LABA1C  Lab Results   Component Value Date    TROPONINI <0.01 03/22/2022       Cardiac Data     EKG: Personally interpreted. 3/21/2022. Normal sinus rhythm. Normal ECG. Echo: 10/2019. Normal left ventricle size, wall thickness, and systolic function with an estimated ejection fraction of 55-60%. No regional wall motion abnormalities are seen. Normal diastolic function. Normal function of all valves. Telemetry: Personally interpreted. Sinus rhythm. Assessment and Plan   1) Probable vasovagal syncope. There have been no arrhythmias on telemetry overnight. Her ECG is normal.  Her echocardiogram 2 years ago showed a normal ejection fraction.   Arrhythmia as the cause seems less likely but with recurrent events will arrange for an event monitor. Encouraged supportive measures including regular meals, hydration, and avoiding alcohol. 2) Palpitations. Will arrange for a 30-day event monitor. 3) Anemia. Will order routine iron studies but defer additional work-up to primary team and oncology. 4) Breast cancer. Management per [facility]. Will sign off. Call with questions. Will arrange for an event monitor. Thank you for allowing us to participate in the care of Leyla Lemos. Yamile Orellana.  [facility], [address] [place] [address]  3/22/2022 10:29 AM

## 2022-03-22 NOTE — CONSULTS
[facility]    Consult Note      CHIEF COMPLAINT: syncope      HISTORY OF PRESENT ILLNESS:      Ms. Shelley Kirk  is a 27 y.o. female we are seeing in consultation for known breast cancer on treatment -she has had locally advanced breast cancer and had chemo surgery\and tamoxifen on board (originally turned down radiation).  She recently relapsed to the axilla and had surgery and radiation to this area  We did a full hyst to make her postmenopausal and started an ai and a cd 4k inhibitor  She at first did not tolerate the highest dose of verzinio due to diarrhea but I had cut the dose to 100mg   She was doing better on this but at work started feeling palpitations and had a syncopal episode  She was admitted for workup   She states her diarrhea has not worsened   She looks well this am         Past Medical History:        Diagnosis Date    Breast CA ([address]     right breast    Depression     Prolonged emergence from general anesthesia     Thyroid disease     hypothyroid     Past Surgical History:        Procedure Laterality Date    AXILLARY SURGERY Right 2021    RIGHT AXILLARY NODE BIOPSY performed by Catherine Ibarra MD at [address] Bilateral 3/2/2020    [place] performed by Keegan Nixon MD at [address] Left 2020    INSERTION OF [place] performed by Keegan Nixon MD at [address] Bilateral 2020    [address] performed by Keegan Nixon MD at [address]      core bx     SECTION      CHOLECYSTECTOMY  2016    ENDOMETRIAL ABLATION      HYSTERECTOMY Bilateral 2022    DAVINCI TOTAL LAPAROSCOPIC HYSTERECTOMY, BILATERAL SALPINGO OOPHORECTOMY, SCAR REVISION, CYSTOTOMY REPAIR performed by Alyce De Jesus MD at [facility]    MASTECTOMY Bilateral 3/2/2020    BILATERAL SIMPLE MASTECTOMY, RIGHT SENTINEL NODE BIOPSY (BLUE DYE ONLY)/ performed by Yris Murcia MD at [address]      left tissue expander removed    PORT SURGERY N/A 10/16/2019    SINGLE LUMEN PORT-A-CATHETER INSERTION LEFT SUBCLAVIAN performed by Yris Murcia MD at [address] [facility] [address] Left 1/25/2022    REVISION OF LEFT MASTECTOMY SCAR performed by Yris Murcia MD at [facility]         Current Medications:    No current facility-administered medications for this encounter. Allergies:  Adhesive tape, Betadine [povidone iodine], and No known allergies    Social History:      Social History     Socioeconomic History    Marital status:      Spouse name: Not on file    Number of children: Not on file    Years of education: Not on file    Highest education level: Not on file   Occupational History    Not on file   Tobacco Use    Smoking status: Never Smoker    Smokeless tobacco: Never Used   Vaping Use    Vaping Use: Never used   Substance and Sexual Activity    Alcohol use: Yes     Comment: occasionally    Drug use: No    Sexual activity: Yes     Partners: Male   Other Topics Concern    Not on file   Social History Narrative    Not on file     Social Determinants of Health     Financial Resource Strain:     Difficulty of Paying Living Expenses: Not on file   Food Insecurity:     Worried About Running Out of Food in the Last Year: Not on file    Natasha of Food in the Last Year: Not on file   Transportation Needs:     Lack of Transportation (Medical): Not on file    Lack of Transportation (Non-Medical):  Not on file   Physical Activity:     Days of Exercise per Week: Not on file    Minutes of Exercise per Session: Not on file   Stress:     Feeling of Stress : Not on file   Social Connections:     Frequency of Communication with Friends and Family: Not on file    Frequency of Social Gatherings with Friends and Family: Not on file    Attends [employer] Services: Not on file    Active Member of Clubs or Organizations: Not on file    Attends Club or Organization Meetings: Not on file    Marital Status: Not on file   Intimate Partner Violence:     Fear of Current or Ex-Partner: Not on file    Emotionally Abused: Not on file    Physically Abused: Not on file    Sexually Abused: Not on file   Housing Stability:     Unable to Pay for Housing in the Last Year: Not on file    Number of [place] in the Last Year: Not on file    Unstable Housing in the Last Year: Not on file          Family History:         Problem Relation Age of Onset    Cancer Paternal Grandmother         breast    Thyroid Disease Paternal Grandmother      REVIEW OF SYSTEMS:    ROS per the HPI   Otherwise  10 point ROS negative     PHYSICAL EXAM:      Vitals:  /72   Pulse 76   Temp 98.3 °F (36.8 °C) (Oral)   Resp 16   Ht 5' 4\" (1.626 m)   Wt 159 lb 6.4 oz (72.3 kg)   LMP 11/01/2019   SpO2 98%   BMI 27.36 kg/m²     CONSTITUTIONAL:  awake, alert, cooperative, no apparent distress, and appears stated age NAD  EYES:  pupils equal, round and reactive to light, extra ocular muscles intact, sclera clear, conjunctiva normal  NECK:  Supple, symmetrical, trachea midline, no adenopathy, thyroid symmetric, not enlarged and no tenderness, skin normal  HEMATOLOGIC/LYMPHATICS:  no cervical lymphadenopathy, no supraclavicular lymphadenopathy,  LUNGS:  No increased work of breathing, good air exchange, clear to auscultation bilaterally, no crackles or wheezing  CARDIOVASCULAR:  , regular rate and rhythm, normal S1 and S2, no S3 or S4, and no murmur noted  ABDOMEN:  No scars, normal bowel sounds, soft, non-distended, non-tender, no masses palpated, no hepatosplenomegally      MUSCULOSKELETAL:  There is no redness, warmth, or swelling of the joints. Full range of motion noted.   Motor strength is 5 out of 5 all extremities bilaterally. NEUROLOGIC:  Awake, alert, oriented to name, place and time. Cranial nerves II-XII are grossly intact. Motor is 5 out of 5 bilaterally. SKIN:  no bruising or bleeding      DATA:    PT/INR:  No results for input(s): PROT, INR in the last 72 hours. PTT:  No results for input(s): APTT in the last 72 hours. CMP:    Lab Results   Component Value Date     2022    K 3.9 2022     2022    CO2 24 2022    BUN 14 2022    PROT 7.3 2022     Magnesium:  No results found for: MG  Phosphorus:  No components found for: PO4  Calcium:  No results found for: CA  CBC:    Lab Results   Component Value Date    WBC 4.5 2022    RBC 3.45 2022    HGB 11.5 2022    HCT 32.6 2022    MCV 94.7 2022    RDW 15.8 2022     2022     DIFF:    Lab Results   Component Value Date    MCV 94.7 2022    RDW 15.8 2022      LDH:  @labcrnt(LDH)@  Uric Acid:  @labcrnt(URIC)@    Radiology Review: No results found.       Problem List  Patient Active Problem List   Diagnosis    H/O  section    Polyhydramnios    Symptomatic cholelithiasis     labor third trimester with  delivery third trimester    Umbilical hernia without obstruction and without gangrene    Malignant neoplasm of lower-outer quadrant of right breast of female, estrogen receptor positive ([address]    Lethargy    Acute ethmoidal sinusitis    Leukocytosis    Breast cancer of lower-outer quadrant of right female breast ([address]    Malignant neoplasm of right female breast ([address]    Status post bilateral mastectomy    Postoperative pain    Vasovagal syncope    Palpitations    Anemia       IMPRESSION/RECOMMENDATIONS:    Breast cancer relapsed to axilla about 3 months ago   She is sp hysterectomy for menopause completion   Pt on verzinio (oral cd 4k inhibitor) and ai   The event she had should not be from this  I did dose reduce due to diarrhea about a month ago and this did better  I wonder if she needs a outpt event moniter ? Ortho statics are ok   ?  Svt we dont have documents of this but possible  Agree with thyroid testing   Consider cardiology consult  I do not think this is drug related or mets  I do not want to stop this tx as her risk of relapse is not small

## 2022-03-22 NOTE — PROGRESS NOTES
Pharmacy Medication Reconciliation Note     List of medications patient is currently taking is complete. Source of information:   1. Patient   2. EMR    Notes regarding home medications:   1. Patient received all of her morning home medication doses today before presenting to the ER. Patient takes most of her doses in the evening. 2. Patient's current chemotherapy regimen includes letrozole and Verzenio. 3. Patient prescribed a 14-day course of econazole cream on 3/18 for a rash on her leg. Patient has not yet started using.       [address] [facility] [place], Pharmacy Intern  3/21/2022 9:32 PM Yes

## 2022-04-26 ENCOUNTER — TELEPHONE (OUTPATIENT)
Dept: CARDIOLOGY CLINIC | Age: 32
End: 2022-04-26

## 2022-04-26 PROCEDURE — 93228 REMOTE 30 DAY ECG REV/REPORT: CPT | Performed by: INTERNAL MEDICINE

## 2022-04-26 NOTE — TELEPHONE ENCOUNTER
Dr. Jose Hudson reviewed monitor results. Baseline sinus rhythm, no reported symptoms. Rare PACs but no sustained, arrhythmias, pauses, or blocks. Please call normal monitor results.

## 2022-04-28 DIAGNOSIS — R55 NEAR SYNCOPE: ICD-10-CM

## 2022-06-27 ENCOUNTER — HOSPITAL ENCOUNTER (OUTPATIENT)
Dept: CT IMAGING | Age: 32
Discharge: HOME | End: 2022-06-27
Payer: OTHER GOVERNMENT

## 2022-06-27 DIAGNOSIS — C50.311 MALIGNANT NEOPLASM OF LOWER-INNER QUADRANT OF RIGHT BREAST OF FEMALE, ESTROGEN RECEPTOR POSITIVE (HCC): ICD-10-CM

## 2022-06-27 DIAGNOSIS — Z17.0 MALIGNANT NEOPLASM OF LOWER-INNER QUADRANT OF RIGHT BREAST OF FEMALE, ESTROGEN RECEPTOR POSITIVE (HCC): ICD-10-CM

## 2022-06-27 PROCEDURE — 6360000004 HC RX CONTRAST MEDICATION: Performed by: INTERNAL MEDICINE

## 2022-06-27 PROCEDURE — 74177 CT ABD & PELVIS W/CONTRAST: CPT

## 2022-06-27 RX ADMIN — IOPAMIDOL 75 ML: 755 INJECTION, SOLUTION INTRAVENOUS at 08:10

## 2022-06-27 RX ADMIN — IOHEXOL 50 ML: 240 INJECTION, SOLUTION INTRATHECAL; INTRAVASCULAR; INTRAVENOUS; ORAL at 08:09

## 2022-09-20 ENCOUNTER — HOSPITAL ENCOUNTER (OUTPATIENT)
Dept: CT IMAGING | Age: 32
Discharge: HOME | End: 2022-09-20
Payer: OTHER GOVERNMENT

## 2022-09-20 DIAGNOSIS — C50.511 MALIGNANT NEOPLASM OF LOWER-OUTER QUADRANT OF RIGHT BREAST OF FEMALE, ESTROGEN RECEPTOR POSITIVE (HCC): ICD-10-CM

## 2022-09-20 DIAGNOSIS — Z17.0 MALIGNANT NEOPLASM OF LOWER-OUTER QUADRANT OF RIGHT BREAST OF FEMALE, ESTROGEN RECEPTOR POSITIVE (HCC): ICD-10-CM

## 2022-09-20 PROCEDURE — 74177 CT ABD & PELVIS W/CONTRAST: CPT

## 2022-09-20 PROCEDURE — 6360000004 HC RX CONTRAST MEDICATION: Performed by: INTERNAL MEDICINE

## 2022-09-20 RX ADMIN — IOPAMIDOL 50 ML: 612 INJECTION, SOLUTION INTRAVENOUS at 09:06

## 2022-09-20 RX ADMIN — IOPAMIDOL 75 ML: 755 INJECTION, SOLUTION INTRAVENOUS at 09:07

## 2022-09-27 NOTE — PROGRESS NOTES
[facility]    CC: Breast CA     Referring physician: Gt Almanzar MD    HPI: This is a 27 y.o. female with a PMHx as delineated below who presents in consultation for breast reconstruction. She was found to have right breast cancer and underwent bilateral mastectomy (3/20) with immediate expander (Runk/Tenzin) placement complicated by exposure of the left expander necessitating removal and staged replacement a few months later. In 11/20, she underwent exchange for permanent implants on the bilateral breasts. She was found to have recurrent breast cancer in the axilla and she underwent extensive radiation. After the radiation, she notes that she has significant pain and discomfort due to contracture. She desired a second opinion and plastic surgery was consulted for evaluation and treatment. The specifics of her breast cancer workup / treatment is as follows:     Diagnosis: DCIS & PALB2 later invasive  Mo/Yr Diagnosed: 2019  Breast Involved:Right  Surgery: Bilateral mastectomy with IBR  Date of Surgery: 3/20  Tumor Size & Grade: [id number];   Stage: 3  Ariel status: Positive (second)  ER: Positive ND: Positive HER2: Negative    Oncologist: Faviola Cantu MD  Radiation: YES (COMPLETED 1/22)    Chemo/Meds: Completed neoadjuvant chemotherapy  Pregnancy/Miscarriages: 3 / 0    PMHx:   Past Medical History:   Diagnosis Date    Breast CA ([facility] [address]     right breast    Depression     Prolonged emergence from general anesthesia     Thyroid disease     hypothyroid     PSHx:   Past Surgical History:   Procedure Laterality Date    AXILLARY SURGERY Right 11/4/2021    RIGHT AXILLARY NODE BIOPSY performed by Pravin Fernandez MD at [facility] 5 Bilateral 3/2/2020    BILATERAL FIRST [facility] performed by Justin Guillory MD at [facility] 5 Left 7/23/2020    INSERTION OF TISUUE EXPANDER LEFT SIDE CHEST WALL performed by Lamin Ying III, MD at [address] [facility] Bilateral 2020    BILATERAL SECOND STAGE BREAST RECONSTRUCTION AND REMOVAL OF PORTACATH performed by Elisabeth Blackburn MD at [address]      core bx     SECTION      CHOLECYSTECTOMY  2016    ENDOMETRIAL ABLATION      HYSTERECTOMY Bilateral 2022    DAVINCI TOTAL LAPAROSCOPIC HYSTERECTOMY, BILATERAL SALPINGO OOPHORECTOMY, SCAR REVISION, CYSTOTOMY REPAIR performed by Sienna Hopson MD at [address] [facility] Bilateral 3/2/2020    BILATERAL SIMPLE MASTECTOMY, RIGHT SENTINEL NODE BIOPSY (BLUE DYE ONLY)/ performed by Chris López MD at [address]      left tissue expander removed    PORT SURGERY N/A 10/16/2019    SINGLE LUMEN PORT-A-CATHETER INSERTION LEFT SUBCLAVIAN performed by Chris López MD at [address] [place] Left 2022    REVISION OF LEFT MASTECTOMY SCAR performed by Chris López MD at [address]: Allergies   Allergen Reactions    Adhesive Tape Hives and Itching     AND TEGADERM (BLISTERS), steri strips    Betadine [Povidone Iodine]      Blisters rash    No Known Allergies      FHx: Past history of breast CA: Yes (paternal grandmother)    Meds:   Current Outpatient Medications   Medication Sig Dispense Refill    Abemaciclib 100 MG TABS Take 100 mg by mouth in the morning and at bedtime Verzenio      letrozole (FEMARA) 2.5 MG tablet Take 2.5 mg by mouth every evening      ondansetron (ZOFRAN) 8 MG tablet Take 8 mg by mouth every 8 hours as needed      econazole nitrate 1 % cream Apply topically daily Apply topically daily.       ibuprofen (ADVIL;MOTRIN) 400 MG tablet Take 2 tablets by mouth every 6 hours as needed for Pain 60 tablet 3    Probiotic Product (PROBIOTIC-10 PO) Take 1 capsule by mouth every evening       Vitamin D (CHOLECALCIFEROL) 25 MCG (1000 UT) TABS tablet Take 1,000 Units by mouth every evening       buPROPion (WELLBUTRIN XL) 150 MG extended release tablet Take 150 mg by mouth nightly       No current facility-administered medications for this visit. SocHx: Smoking: No    ETOH: none    Drug use: No    ROS   Constitutional: Negative for chills and fever. HENT: Negative for congestion, facial swelling, and voice change. Eyes: Negative for photophobia and visual disturbance. Respiratory: Negative for apnea, cough, chest tightness and shortness of breath. Cardiovascular: Negative for chest pain and palpitations. Gastrointestinal: Negative for dysphagia and early satiety. Genitourinary: Negative for difficulty urinating, dysuria, flank pain, frequency and hematuria. Musculoskeletal: Negative for new gait problem, joint swelling and myalgias. Skin: Negative for color change, pallor and rash. Endocrine: negative for tremors, temperature intolerance or polydipsia. Allergic/Immunologic: Negative for new environmental or food allergies. Neurological: Negative for dizziness, seizures, speech difficulty, numbness. Hematological: Negative for adenopathy. Psychiatric/Behavioral: Negative for agitation and confusion. EXAM  /82   Pulse 76   Temp 97.7 °F (36.5 °C)   Wt 156 lb (70.8 kg)   LMP 11/01/2019   SpO2 98%   BMI 26.78 kg/m²     GEN: NAD, pleasant, healthy  CVS: RRR  PULM: No respiratory distress  HEENT: PERRLA/EOMI; hearing appears within normal limits  NECK: Supple with trachea in midline, no masses  EXT: No lymphedema noted  ABD: soft/NT/ND   NEURO: No focal deficits, no obvious CN deficits  BACK: Bilateral latiss muscle intact  BREAST:   Physical Exam    Bra size: 36C \"unsure\"  Desired bra size: Slightly larger if feasible  Ptosis grade:   R: NA   L: NA  The left breast size is smaller than the right breast.  There were no palpable masses with no palpable lymphadenopathy in the ipsilateral right axilla.    Nipple retraction: NA bilaterally  Contracture noted on the right breast (Baker class 3)  Healed near IMF incisions bilaterally  Moderate / severe animation deformity    Left breast sternal notch to nipple: NA  Right breast sternal notch to nipple: NA    Left breast nipple to inframammary fold: NA  Right breast nipple to inframammary fold: NA    Left breast width 15 cm  Right breast width 14.6 cm    IMAGING: Reviewed    IMP: 27 y.o. female with breast cancer who presents for discussion regarding reconstruction options  PLAN: We discussed multiple options including transition to the prepectoral space to improve her animation deformity and likely staged fat grafting vs possible B AYSHA. She desires to maintain and increase her projection compared to her current reconstruction and we discussed that a AYSHA may not provide that for her. She would then desire a possible staged abdominoplasty with the fat grafting. However, would not recommend any surgical intervention until she is 1 year from the completion of her radiation treatment. She understands that any revision - especially in the setting of radiation - increases the risk of losing her reconstruction. A discussion regarding surgical options including immediate vs delayed approaches, implant based vs autologous, as well as additional planned revisional surgeries was performed with the patient. Multiple autologous donor sites were discussed as well. Clinical photos were obtained. We additionally discussed the FDA recommendations regarding monitoring of silicone implants. The risks, benefits, alternatives, outcomes, personnel involved were discussed. Specifically, the risks including, but not limited to: bleeding that may necessitate transfusion or operation, infection, seroma, reoperation, nonhealing, poor cosmetic outcome, asymmetry, scarring, partial or total flap loss, donor site morbidity, VTE (DVT/PE), and death were discussed. All questions were answered in a satisfactory manner according to the patient.      Total encounter time: 65 min with > 50% spent with face to face (or vitual) counseling and coordination of care.     Adeola Khan MD  [address]  09/28/22

## 2022-09-28 ENCOUNTER — OFFICE VISIT (OUTPATIENT)
Dept: SURGERY | Age: 32
End: 2022-09-28
Payer: OTHER GOVERNMENT

## 2022-09-28 VITALS
BODY MASS INDEX: 26.78 KG/M2 | OXYGEN SATURATION: 98 % | SYSTOLIC BLOOD PRESSURE: 118 MMHG | TEMPERATURE: 97.7 F | DIASTOLIC BLOOD PRESSURE: 82 MMHG | HEART RATE: 76 BPM | WEIGHT: 156 LBS

## 2022-09-28 DIAGNOSIS — C50.911 MALIGNANT NEOPLASM OF RIGHT FEMALE BREAST, UNSPECIFIED ESTROGEN RECEPTOR STATUS, UNSPECIFIED SITE OF BREAST (HCC): Primary | ICD-10-CM

## 2022-09-28 DIAGNOSIS — Z98.890 S/P BREAST RECONSTRUCTION, BILATERAL: ICD-10-CM

## 2022-09-28 PROCEDURE — 99205 OFFICE O/P NEW HI 60 MIN: CPT | Performed by: SURGERY

## 2022-10-06 ENCOUNTER — TELEPHONE (OUTPATIENT)
Dept: SURGERY | Age: 32
End: 2022-10-06

## 2022-10-06 NOTE — TELEPHONE ENCOUNTER
Patient returned call. Dr Matt Camargo does not take patient's insurance Bilateral AYSHA. Patient had questions about healing from AYSHA vs implant reconstruction. Questions answered.  DONE

## 2022-10-06 NOTE — TELEPHONE ENCOUNTER
Returned patient call. Patient seen on 9/28/22 with below plan:     IMP: 27 y.o. female with breast cancer who presents for discussion regarding reconstruction options  PLAN: We discussed multiple options including transition to the prepectoral space to improve her animation deformity and likely staged fat grafting vs possible B AYSHA. She desires to maintain and increase her projection compared to her current reconstruction and we discussed that a AYSHA may not provide that for her. She would then desire a possible staged abdominoplasty with the fat grafting. However, would not recommend any surgical intervention until she is 1 year from the completion of her radiation treatment. She understands that any revision - especially in the setting of radiation - increases the risk of losing her reconstruction. LM for patient to return office call.

## 2022-12-13 ENCOUNTER — HOSPITAL ENCOUNTER (OUTPATIENT)
Dept: CT IMAGING | Age: 32
Discharge: HOME | End: 2022-12-13
Payer: OTHER GOVERNMENT

## 2022-12-13 DIAGNOSIS — Z17.0 MALIGNANT NEOPLASM OF LOWER-INNER QUADRANT OF RIGHT BREAST OF FEMALE, ESTROGEN RECEPTOR POSITIVE (HCC): ICD-10-CM

## 2022-12-13 DIAGNOSIS — C50.311 MALIGNANT NEOPLASM OF LOWER-INNER QUADRANT OF RIGHT BREAST OF FEMALE, ESTROGEN RECEPTOR POSITIVE (HCC): ICD-10-CM

## 2022-12-13 LAB
CREAT SERPL-MCNC: 0.8 MG/DL (ref 0.6–1.1)
GFR SERPL CREATININE-BSD FRML MDRD: >60 ML/MIN/{1.73_M2}

## 2022-12-13 PROCEDURE — 82565 ASSAY OF CREATININE: CPT

## 2022-12-13 PROCEDURE — 36415 COLL VENOUS BLD VENIPUNCTURE: CPT

## 2022-12-13 PROCEDURE — 71260 CT THORAX DX C+: CPT

## 2022-12-13 PROCEDURE — 6360000004 HC RX CONTRAST MEDICATION: Performed by: INTERNAL MEDICINE

## 2022-12-13 RX ADMIN — IOPAMIDOL 100 ML: 755 INJECTION, SOLUTION INTRAVENOUS at 11:31

## 2022-12-13 RX ADMIN — IOPAMIDOL 50 ML: 612 INJECTION, SOLUTION INTRAVENOUS at 08:45

## 2022-12-14 ENCOUNTER — TELEPHONE (OUTPATIENT)
Dept: SURGERY | Age: 32
End: 2022-12-14

## 2022-12-14 NOTE — TELEPHONE ENCOUNTER
Patient called asking if she can get her surgery process started. Her  leaves at the beginning of April and she would like to have her surgery and be mostly recovered by then. She has her 1 year post radiation appointment on 1/18.      Please call: [phone]

## 2022-12-14 NOTE — TELEPHONE ENCOUNTER
I returned the patients call mentioned below. She is aware that we are not able to start the surgery process until she meets with Dr. Narinder Jordan and we have a final surgery plan. She was also advised that we do not hold OR time or schedule without an insurance approval.    The patient is scheduled to see Dr. Narinder Jordan in office 1-. I will close this phone note.

## 2022-12-15 ENCOUNTER — TELEPHONE (OUTPATIENT)
Dept: SURGERY | Age: 32
End: 2022-12-15

## 2022-12-15 NOTE — TELEPHONE ENCOUNTER
Returned patient call, patient was advised by clinical staff that per MD, her following up with original surgeon was the the appropriate plan.  DONE

## 2022-12-15 NOTE — TELEPHONE ENCOUNTER
Shelli Villalta called the office with concerns about her right breast and axilla being more swollen in the past week. She states that she is having pain and tingling down her right arm. She states that her right breast is tighter and more protruding. Dr. Marko Peacock was the surgeon that placed the implants. She was seen in our office as a second opinion. She was unsure of which MD to contact. Dr. Narinder Jordan notified. Instructed to have the patient call Dr. Marko Peacock and Dr. Edwina Tolliver.

## 2022-12-15 NOTE — TELEPHONE ENCOUNTER
Patient called in stating that she contacted her previous plastic surgeon and was informed that her implant is leaking    The soonest she can be seen by them is 12/27/2022     Patient would like to know if this is okay with the our clinical team    Please contact the patient at [phone]

## 2022-12-21 ENCOUNTER — TELEPHONE (OUTPATIENT)
Dept: SURGERY | Age: 32
End: 2022-12-21

## 2022-12-21 NOTE — TELEPHONE ENCOUNTER
Patient called stating she went to see Dr Eryn Cr and he would like for her to have surgery asap due to the implant sitting on a nerve. She would like to know if Dr Leeroy Lieberman wants to do the surgery or if Dr Eryn Cr should do it.     Please call: [phone]

## 2022-12-21 NOTE — TELEPHONE ENCOUNTER
I spoke with Dr. Luis Link in regards to the phone note below. I returned the patients call mentioned below. The patient is scheduled to see Dr. Luis Link in the office 1- and aware that Dr. Luis Link will not offer surgical intervention until 1 year post radiation. The patient stated that she needs surgery ASAP due to pain and was advised that once we see her in January and establish a surgical plan we will need to submit for insurance approval and once approval is received we will then schedule surgery. The patient was advised that if Dr. Sravanthi Melgar feels that things need to happen faster or if he is offering surgery sooner she may want to consider that option. The patient asked that I cancel her January visit with Dr. Luis Link. I will close this phone note.

## 2023-04-25 ENCOUNTER — HOSPITAL ENCOUNTER (OUTPATIENT)
Dept: CT IMAGING | Age: 33
Discharge: HOME | End: 2023-04-25
Payer: OTHER GOVERNMENT

## 2023-04-25 DIAGNOSIS — C50.311 MALIGNANT NEOPLASM OF LOWER-INNER QUADRANT OF RIGHT FEMALE BREAST, UNSPECIFIED ESTROGEN RECEPTOR STATUS (HCC): ICD-10-CM

## 2023-04-25 PROCEDURE — 6360000004 HC RX CONTRAST MEDICATION: Performed by: SURGERY

## 2023-04-25 PROCEDURE — 71260 CT THORAX DX C+: CPT

## 2023-04-25 RX ADMIN — IOPAMIDOL 50 ML: 612 INJECTION, SOLUTION INTRAVENOUS at 08:39

## 2023-04-25 RX ADMIN — IOPAMIDOL 75 ML: 755 INJECTION, SOLUTION INTRAVENOUS at 08:39

## 2023-09-01 ENCOUNTER — HOSPITAL ENCOUNTER (OUTPATIENT)
Dept: CT IMAGING | Age: 33
Discharge: HOME | End: 2023-09-01
Attending: INTERNAL MEDICINE
Payer: OTHER GOVERNMENT

## 2023-09-01 DIAGNOSIS — C50.311 MALIGNANT NEOPLASM OF LOWER-INNER QUADRANT OF RIGHT FEMALE BREAST, UNSPECIFIED ESTROGEN RECEPTOR STATUS (HCC): ICD-10-CM

## 2023-09-01 PROCEDURE — 6360000004 HC RX CONTRAST MEDICATION: Performed by: INTERNAL MEDICINE

## 2023-09-01 PROCEDURE — 71260 CT THORAX DX C+: CPT

## 2023-09-01 RX ADMIN — IOPAMIDOL 50 ML: 612 INJECTION, SOLUTION INTRAVENOUS at 08:50

## 2023-09-01 RX ADMIN — IOPAMIDOL 75 ML: 755 INJECTION, SOLUTION INTRAVENOUS at 08:50

## 2024-01-09 ENCOUNTER — HOSPITAL ENCOUNTER (OUTPATIENT)
Dept: CT IMAGING | Age: 34
Discharge: HOME | End: 2024-01-09
Attending: INTERNAL MEDICINE
Payer: OTHER GOVERNMENT

## 2024-01-09 DIAGNOSIS — C50.311 MALIGNANT NEOPLASM OF LOWER-INNER QUADRANT OF RIGHT FEMALE BREAST, UNSPECIFIED ESTROGEN RECEPTOR STATUS (HCC): ICD-10-CM

## 2024-01-09 PROCEDURE — 6360000004 HC RX CONTRAST MEDICATION: Performed by: INTERNAL MEDICINE

## 2024-01-09 PROCEDURE — 74177 CT ABD & PELVIS W/CONTRAST: CPT

## 2024-01-09 RX ADMIN — IOPAMIDOL 50 ML: 612 INJECTION, SOLUTION INTRAVENOUS at 07:37

## 2024-01-09 RX ADMIN — IOPAMIDOL 75 ML: 755 INJECTION, SOLUTION INTRAVENOUS at 07:37

## 2024-02-27 ENCOUNTER — HOSPITAL ENCOUNTER (OUTPATIENT)
Dept: PET IMAGING | Age: 34
Discharge: HOME | End: 2024-02-27
Payer: OTHER GOVERNMENT

## 2024-02-27 DIAGNOSIS — C50.919 MALIGNANT NEOPLASM OF FEMALE BREAST, UNSPECIFIED ESTROGEN RECEPTOR STATUS, UNSPECIFIED LATERALITY, UNSPECIFIED SITE OF BREAST (HCC): ICD-10-CM

## 2024-02-27 PROCEDURE — A9609 HC RX DIAGNOSTIC RADIOPHARMACEUTICAL: HCPCS | Performed by: INTERNAL MEDICINE

## 2024-02-27 PROCEDURE — 78815 PET IMAGE W/CT SKULL-THIGH: CPT

## 2024-02-27 PROCEDURE — 3430000000 HC RX DIAGNOSTIC RADIOPHARMACEUTICAL: Performed by: INTERNAL MEDICINE

## 2024-02-27 RX ORDER — FLUDEOXYGLUCOSE F 18 200 MCI/ML
14.03 INJECTION, SOLUTION INTRAVENOUS
Status: COMPLETED | OUTPATIENT
Start: 2024-02-27 | End: 2024-02-27

## 2024-02-27 RX ADMIN — FLUDEOXYGLUCOSE F 18 14.03 MILLICURIE: 200 INJECTION, SOLUTION INTRAVENOUS at 10:00

## 2024-08-28 ENCOUNTER — HOSPITAL ENCOUNTER (OUTPATIENT)
Dept: CT IMAGING | Age: 34
Discharge: HOME | End: 2024-08-28
Attending: INTERNAL MEDICINE
Payer: OTHER GOVERNMENT

## 2024-08-28 DIAGNOSIS — C50.311 MALIGNANT NEOPLASM OF LOWER-INNER QUADRANT OF RIGHT FEMALE BREAST, UNSPECIFIED ESTROGEN RECEPTOR STATUS (HCC): ICD-10-CM

## 2024-08-28 PROCEDURE — 74177 CT ABD & PELVIS W/CONTRAST: CPT

## 2024-08-28 PROCEDURE — 6360000004 HC RX CONTRAST MEDICATION: Performed by: INTERNAL MEDICINE

## 2024-08-28 RX ORDER — IOPAMIDOL 612 MG/ML
50 INJECTION, SOLUTION INTRAVASCULAR
Status: COMPLETED | OUTPATIENT
Start: 2024-08-28 | End: 2024-08-28

## 2024-08-28 RX ORDER — IOPAMIDOL 755 MG/ML
75 INJECTION, SOLUTION INTRAVASCULAR
Status: COMPLETED | OUTPATIENT
Start: 2024-08-28 | End: 2024-08-28

## 2024-08-28 RX ADMIN — IOPAMIDOL 75 ML: 755 INJECTION, SOLUTION INTRAVENOUS at 08:54

## 2024-08-28 RX ADMIN — IOPAMIDOL 50 ML: 612 INJECTION, SOLUTION INTRAVENOUS at 08:54

## 2025-02-11 ENCOUNTER — OFFICE VISIT (OUTPATIENT)
Dept: SURGERY | Age: 35
End: 2025-02-11
Payer: OTHER GOVERNMENT

## 2025-02-11 VITALS — BODY MASS INDEX: 21.11 KG/M2 | WEIGHT: 123 LBS

## 2025-02-11 DIAGNOSIS — K64.5 THROMBOSED EXTERNAL HEMORRHOID: Primary | ICD-10-CM

## 2025-02-11 PROCEDURE — 99203 OFFICE O/P NEW LOW 30 MIN: CPT | Performed by: SURGERY

## 2025-02-11 NOTE — PROGRESS NOTES
Bilateral 3/2/2020    BILATERAL SIMPLE MASTECTOMY, RIGHT SENTINEL NODE BIOPSY (BLUE DYE ONLY)/ performed by Ryanne Farr MD at [facility] OR    OTHER SURGICAL HISTORY      left tissue expander removed    PORT SURGERY N/A 10/16/2019    SINGLE LUMEN PORT-A-CATHETER INSERTION LEFT SUBCLAVIAN performed by Ryanne Farr MD at [facility] OR    SCAR REVISION Left 1/25/2022    REVISION OF LEFT MASTECTOMY SCAR performed by Ryanne Farr MD at [facility] OR    WISDOM TOOTH EXTRACTION         Cancer-related family history includes Cancer in her paternal grandmother.    Social History:   Social History     Tobacco Use    Smoking status: Never    Smokeless tobacco: Never   Substance Use Topics    Alcohol use: Yes     Comment: occasionally      Tobacco cessation counseling provided as appropriate.    No colonoscopy on file   No cologuard on file  No FIT/FOBT on file   No flexible sigmoidoscopy on file      Objective:     Physical Exam   Wt 55.8 kg (123 lb)   LMP 11/01/2019   BMI 21.11 kg/m²   Constitutional: Appears well-developed and well-nourished. Grooming appropriate. No gross deformities. Body mass index is 21.11 kg/m².    Abdominal/wound: Soft, nontender, nondistended    Anorectal Exam: Chaperone present in room throughout exam.  Patient placed in the left lateral position.  Buttocks spread.  Digital rectal exam performed with lubricated finger.  Anoscopy performed.  Findings reveal: Right lateral thrombosed external hemorrhoid, soft but some tenderness    Labs reviewed: None  Radiology reviewed: None  Last colonoscopy: None  Coordination/discussion with: None    Assessment/Plan:     A/P:  New undiagnosed problem(s) with uncertain prognosis: Intermittent thrombosed external hemorrhoid  Established problem(s): Diarrhea and constipation due to medications  Additional workup/treatment planned: Continue OTC/conservative management, consider surgical excision  Risk of complications/morbidity: Moderate  Risk factors: Chronic GI

## 2025-02-11 NOTE — PATIENT INSTRUCTIONS
Hemorrhoids: Information and Care Instructions        Hemorrhoids are enlarged veins that develop in the anal canal. They can occur with constipation, diarrhea, straining and pushing during bowel movements, pregnancy, and smoking/coughing. The tissue can get irritated and inflamed, causing bleeding, itching, swelling, and pain.     Hemorrhoids can be internal or external (or occasionally both). Sometimes internal hemorrhoids can prolapse, or come out of the anus, causing difficulty keeping clean, mucus drainage, bleeding, and discomfort. External hemorrhoids are more likely to clot and cause sudden severe pain on the outside of the anus.    They can be uncomfortable at times, but hemorrhoids rarely are a life-threatening problem.  However, not all bleeding and pain can be blamed on hemorrhoids until a thorough examination (and sometimes a colonoscopy) is performed.     The initial treatment for both internal and external hemorrhoids is the same, as below:    STOOL REGIMEN for hemorrhoids:    Stools should be soft, formed, and easy to pass consistency, not diarrhea, without the need to strain.    1) Eat a healthy diet with lots of fruits and vegetables. Exercise and be active.    2) Use a fiber supplement twice daily (Metamucil, Benefiber, Konsyl, Citrucel, generic brand, etc) per package instructions.    - Women should aim for 25 grams of fiber daily.    - Men should aim for 30-35 grams of fiber daily.    3) Drink 6-8 glasses of water per day. This will work with the fiber to regulate your stools.    4) MiraLax is safe to use every day, and can be used to help lubricate and soften stool.    5) Colace stool softeners can also be used as needed. Avoid Senna and sennakot laxative products, as they may damage the colon with long-term use.    Warm water sitz baths (sit in a tub filled with 3-4 inches of warm water) can be done 1-2 times daily for 5 min to help with hygiene and relaxation.     DO NOT STRAIN ON THE

## 2025-02-25 ENCOUNTER — HOSPITAL ENCOUNTER (OUTPATIENT)
Dept: CT IMAGING | Age: 35
Discharge: HOME | End: 2025-02-25
Attending: INTERNAL MEDICINE
Payer: OTHER GOVERNMENT

## 2025-02-25 DIAGNOSIS — C50.511 MALIGNANT NEOPLASM OF LOWER-OUTER QUADRANT OF RIGHT FEMALE BREAST, UNSPECIFIED ESTROGEN RECEPTOR STATUS (HCC): ICD-10-CM

## 2025-02-25 LAB
PERFORMED ON: NORMAL
POC CREATININE: 0.9 MG/DL (ref 0.6–1.1)
POC SAMPLE TYPE: NORMAL

## 2025-02-25 PROCEDURE — 82565 ASSAY OF CREATININE: CPT

## 2025-02-25 PROCEDURE — 6360000004 HC RX CONTRAST MEDICATION: Performed by: INTERNAL MEDICINE

## 2025-02-25 PROCEDURE — 74177 CT ABD & PELVIS W/CONTRAST: CPT

## 2025-02-25 RX ORDER — IOPAMIDOL 612 MG/ML
50 INJECTION, SOLUTION INTRAVASCULAR
Status: COMPLETED | OUTPATIENT
Start: 2025-02-25 | End: 2025-02-25

## 2025-02-25 RX ORDER — IOPAMIDOL 755 MG/ML
75 INJECTION, SOLUTION INTRAVASCULAR
Status: COMPLETED | OUTPATIENT
Start: 2025-02-25 | End: 2025-02-25

## 2025-02-25 RX ADMIN — IOPAMIDOL 50 ML: 612 INJECTION, SOLUTION INTRAVENOUS at 10:36

## 2025-02-25 RX ADMIN — IOPAMIDOL 75 ML: 755 INJECTION, SOLUTION INTRAVENOUS at 10:36

## 2025-08-13 ENCOUNTER — HOSPITAL ENCOUNTER (OUTPATIENT)
Dept: CT IMAGING | Age: 35
Discharge: HOME | End: 2025-08-13
Attending: INTERNAL MEDICINE
Payer: OTHER GOVERNMENT

## 2025-08-13 DIAGNOSIS — C50.311 MALIGNANT NEOPLASM OF LOWER-INNER QUADRANT OF RIGHT FEMALE BREAST, UNSPECIFIED ESTROGEN RECEPTOR STATUS (HCC): ICD-10-CM

## 2025-08-13 PROCEDURE — 74177 CT ABD & PELVIS W/CONTRAST: CPT

## 2025-08-13 PROCEDURE — 6360000004 HC RX CONTRAST MEDICATION: Performed by: INTERNAL MEDICINE

## 2025-08-13 RX ORDER — IOPAMIDOL 612 MG/ML
50 INJECTION, SOLUTION INTRAVASCULAR
Status: COMPLETED | OUTPATIENT
Start: 2025-08-13 | End: 2025-08-13

## 2025-08-13 RX ORDER — IOPAMIDOL 755 MG/ML
75 INJECTION, SOLUTION INTRAVASCULAR
Status: COMPLETED | OUTPATIENT
Start: 2025-08-13 | End: 2025-08-13

## 2025-08-13 RX ADMIN — IOPAMIDOL 50 ML: 612 INJECTION, SOLUTION INTRAVENOUS at 10:56

## 2025-08-13 RX ADMIN — IOPAMIDOL 75 ML: 755 INJECTION, SOLUTION INTRAVENOUS at 10:55

## (undated) DEVICE — SUTURE VCRL SZ 3-0 L18IN ABSRB UD L26MM SH 1/2 CIR J864D

## (undated) DEVICE — NEEDLE HYPO 25GA L1.5IN BVL ORIENTED ECLIPSE

## (undated) DEVICE — 3M™ STERI-STRIP™ COMPOUND BENZOIN TINCTURE 40 BAGS/CARTON 4 CARTONS/CASE C1544: Brand: 3M™ STERI-STRIP™

## (undated) DEVICE — SHEET,DRAPE,53X77,STERILE: Brand: MEDLINE

## (undated) DEVICE — Z DISCONTINUED BY MEDLINE USE 2711682 TRAY SKIN PREP DRY W/ PREM GLV

## (undated) DEVICE — SPONGE,LAP,18"X18",DLX,XR,ST,5/PK,40/PK: Brand: MEDLINE

## (undated) DEVICE — CANISTER, RIGID, 1200CC: Brand: MEDLINE INDUSTRIES, INC.

## (undated) DEVICE — GOWN,SIRUS,POLYRNF,BRTHSLV,LG,30/CS: Brand: MEDLINE

## (undated) DEVICE — GOWN SIRUS NONREIN XL W/TWL: Brand: MEDLINE INDUSTRIES, INC.

## (undated) DEVICE — GLOVE ORANGE PI 7 1/2   MSG9075

## (undated) DEVICE — SURGICAL SET UP - SURE SET: Brand: MEDLINE INDUSTRIES, INC.

## (undated) DEVICE — MEDICINE CUP, GRADUATED, STER: Brand: MEDLINE

## (undated) DEVICE — DECANTER BAG 9": Brand: MEDLINE INDUSTRIES, INC.

## (undated) DEVICE — STANDARD HYPODERMIC NEEDLE,POLYPROPYLENE HUB: Brand: MONOJECT

## (undated) DEVICE — SYRINGE MED 10ML TRNSLUC BRL PLUNG BLK MRK POLYPR CTRL

## (undated) DEVICE — RESERVOIR,SUCTION,100CC,SILICONE: Brand: MEDLINE

## (undated) DEVICE — SPONGE GZ W4XL8IN COT WVN 12 PLY

## (undated) DEVICE — Z INACTIVE USE 2735373 APPLICATOR FBR LAIN COT WOOD TIP ECONOMICAL

## (undated) DEVICE — BOWL MED L 32OZ PLAS W/ MOLD GRAD EZ OPN PEEL PCH

## (undated) DEVICE — ST FLUFF LG 1 PLY: Brand: DEROYAL

## (undated) DEVICE — 3M™ TEGADERM™ TRANSPARENT FILM DRESSING FRAME STYLE, 1626W, 4 IN X 4-3/4 IN (10 CM X 12 CM), 50/CT 4CT/CASE: Brand: 3M™ TEGADERM™

## (undated) DEVICE — PUMP PAIN MGMT 400ML 4ML/HR 2 W/ SIL SOAK CATH FOR ABD

## (undated) DEVICE — Z CONVERTED USE 2271043 CONTAINER SPEC COLL 4OZ SCR ON LID PEEL PCH

## (undated) DEVICE — INTENDED FOR TISSUE SEPARATION, AND OTHER PROCEDURES THAT REQUIRE A SHARP SURGICAL BLADE TO PUNCTURE OR CUT.: Brand: BARD-PARKER ® CARBON RIB-BACK BLADES

## (undated) DEVICE — APPLICATOR MEDICATED 26 CC SOLUTION HI LT ORNG CHLORAPREP

## (undated) DEVICE — SUTURE VCRL + SZ 4-0 L18IN ABSRB UD L19MM PS-2 3/8 CIR PRIM VCP496H

## (undated) DEVICE — COUNTER NDL 40 COUNT HLD 70 NUM FOAM BLK SGL MAG W BLDE REMV

## (undated) DEVICE — TUBING, SUCTION, 1/4" X 12', STRAIGHT: Brand: MEDLINE

## (undated) DEVICE — SUTURE COAT VCRL SZ 4-0 L18IN ABSRB UD L19MM PS-2 1/2 CIR J496G

## (undated) DEVICE — SOLUTION IV IRRIG POUR BRL 0.9% SODIUM CHL 2F7124

## (undated) DEVICE — SYRINGE MED 10ML LUERLOCK TIP W/O SFTY DISP

## (undated) DEVICE — SUTURE VCRL SZ 3-0 L27IN ABSRB UD L26MM SH 1/2 CIR J416H

## (undated) DEVICE — COVER,MAYO STAND,XL,STERILE: Brand: MEDLINE

## (undated) DEVICE — E-Z CLEAN, NON-STICK, PTFE COATED, ELECTROSURGICAL BLADE ELECTRODE, MODIFIED EXTENDED INSULATION, 2.5 INCH (6.35 CM): Brand: MEGADYNE

## (undated) DEVICE — BRA SURG X LG 42-44 IN VELCRO FRONT CLOSURE LACE TRIM

## (undated) DEVICE — NEEDLE HYPO 22GA L1.5IN BLK POLYPR HUB S STL REG BVL STR

## (undated) DEVICE — TRAY PREP DRY W/ PREM GLV 2 APPL 6 SPNG 2 UNDPD 1 OVERWRAP

## (undated) DEVICE — SURE SET-DOUBLE BASIN-LF: Brand: MEDLINE INDUSTRIES, INC.

## (undated) DEVICE — 3M™ STERI-STRIP™ REINFORCED ADHESIVE SKIN CLOSURES, R1548, 1 IN X 5 IN (25 MM X 125 MM), 4 STRIPS/ENVELOPE: Brand: 3M™ STERI-STRIP™

## (undated) DEVICE — 3M™ MEDIPORE™ SOFT CLOTH TAPE, 4 INCH X 10 YARDS, 12 ROLLS/CASE, 2964: Brand: 3M™ MEDIPORE™

## (undated) DEVICE — GLOVE SURG SZ 7 L11.2IN THK9.8MIL STRW LTX POLYMER BEAD CUF

## (undated) DEVICE — DRAPE,REIN 53X77,STERILE: Brand: MEDLINE

## (undated) DEVICE — JEWISH HOSPITAL TURNOVER KIT: Brand: MEDLINE INDUSTRIES, INC.

## (undated) DEVICE — STAPLER SKIN H3.9MM WIRE DIA0.58MM CRWN 6.9MM 35 STPL ROT

## (undated) DEVICE — ELECTROSURGICAL PENCIL ROCKER SWITCH NON COATED BLADE ELECTRODE 10 FT (3 M) CORD HOLSTER: Brand: MEGADYNE

## (undated) DEVICE — CANNULA SEAL

## (undated) DEVICE — SUTURE VCRL SZ 2-0 L18IN ABSRB UD CT-1 L36MM 1/2 CIR J839D

## (undated) DEVICE — STRIP,CLOSURE,WOUND,MEDI-STRIP,1/2X4: Brand: MEDLINE

## (undated) DEVICE — GAUZE,SPONGE,4"X4",16PLY,XRAY,STRL,LF: Brand: MEDLINE

## (undated) DEVICE — SUTURE PROL SZ 2-0 L30IN NONABSORBABLE BLU L26MM CT-2 1/2 8411H

## (undated) DEVICE — STERILE VELCLOSE ELASTIC BANDAGE, 6IN: Brand: VELCLOSE

## (undated) DEVICE — SUTURE VCRL + SZ 3-0 L27IN ABSRB UD L26MM SH 1/2 CIR VCP416H

## (undated) DEVICE — SPONGE GZ W4XL4IN COT 12 PLY TYP VII WVN C FLD DSGN

## (undated) DEVICE — DRAPE,T,LAPARO,TRANS,STERILE: Brand: MEDLINE

## (undated) DEVICE — ARM DRAPE

## (undated) DEVICE — COVER LT HNDL BLU PLAS

## (undated) DEVICE — PENCIL ES ULT VAC W TELSCP NOSE EZ CLN BLDE 10FT

## (undated) DEVICE — PENCIL ES L3M BTTN SWCH S STL HEX LOK BLDE ELECTRD HOLSTER

## (undated) DEVICE — ROBOTIC GYN: Brand: MEDLINE INDUSTRIES, INC.

## (undated) DEVICE — GARMENT,MEDLINE,DVT,INT,CALF,MED, GEN2: Brand: MEDLINE

## (undated) DEVICE — Z INACTIVE USE 2641837 CLIP LIG M BLU TI HRT SHP WIRE HORZ 600 PER BX

## (undated) DEVICE — ELECTRO LUBE IS A SINGLE PATIENT USE DEVICE THAT IS INTENDED TO BE USED ON ELECTROSURGICAL ELECTRODES TO REDUCE STICKING.: Brand: KEY SURGICAL ELECTRO LUBE

## (undated) DEVICE — SYRINGE IRRIG 60ML SFT PLIABLE BLB EZ TO GRP 1 HND USE W/

## (undated) DEVICE — SUTURE PROL SZ 3-0 L36IN NONABSORBABLE BLU L26MM SH 1/2 CIR 8522H

## (undated) DEVICE — SUTURE VCRL SZ 2-0 L27IN ABSRB UD L26MM SH 1/2 CIR J417H

## (undated) DEVICE — NEEDLE 21GAX0.75X12IN COLLECT BLD SFTY

## (undated) DEVICE — BLADELESS OBTURATOR: Brand: WECK VISTA

## (undated) DEVICE — GAUZE,SPONGE,2"X2",8PLY,STERILE,LF,2'S: Brand: MEDLINE

## (undated) DEVICE — DEVICE SECUREMENT AD W/ TRICOT ANCHR PD FOR F LTX SIL CATH

## (undated) DEVICE — MERCY HEALTH WEST TURNOVER: Brand: MEDLINE INDUSTRIES, INC.

## (undated) DEVICE — JELLY LUBRICATING 2.7GM H2O SOL GREASELESS E-Z

## (undated) DEVICE — SUTURE MCRYL SZ 4-0 L27IN ABSRB UD L19MM PS-2 1/2 CIR PRIM Y426H

## (undated) DEVICE — APPLICATOR SURG XL L38CM FOR ARISTA ABSRB HEMSTAT FLEXITIP

## (undated) DEVICE — CLEANER,CAUTERY TIP,2X2",STERILE: Brand: MEDLINE

## (undated) DEVICE — STERILE POLYISOPRENE POWDER-FREE SURGICAL GLOVES: Brand: PROTEXIS

## (undated) DEVICE — DRAIN,WOUND,15FR,3/16,FULL-FLUTED: Brand: MEDLINE

## (undated) DEVICE — 3M™ TEGADERM™ TRANSPARENT FILM DRESSING FRAME STYLE, 1624W, 2-3/8 IN X 2-3/4 IN (6 CM X 7 CM), 100/CT 4CT/CASE: Brand: 3M™ TEGADERM™

## (undated) DEVICE — DRAIN SURG L0.375IN SIL CHN FLAT FULL FLOATED RADPQ CLS WND

## (undated) DEVICE — SUTURE VCRL SZ 2-0 L27IN ABSRB VLT SH L26MM 1/2 CIR J785G

## (undated) DEVICE — SOLUTION SCRB 4OZ 10% POVIDONE IOD ANTIMIC BTL

## (undated) DEVICE — GOWN,SIRUS,POLYRNF,BRTHSLV,XL,30/CS: Brand: MEDLINE

## (undated) DEVICE — SPONGE LAP W18XL18IN WHT COT 4 PLY FLD STRUNG RADPQ DISP ST

## (undated) DEVICE — INSUFFLATION NEEDLE TO ESTABLISH PNEUMOPERITONEUM.: Brand: INSUFFLATION NEEDLE

## (undated) DEVICE — ADHESIVE SKIN CLOSURE TOP 36 CC HI VISC DERMBND MINI

## (undated) DEVICE — CLIP LIG M TI 6 SIL HNDL FOR OPN AND ENDOSCP SGL APPL

## (undated) DEVICE — AGENT HEMSTAT 3GM PURIFIED PLNT STARCH PWD ABSRB ARISTA AH

## (undated) DEVICE — LARGE BORE STOPCOCK WITH ROTATING MALE LUER LOCK

## (undated) DEVICE — TOTAL TRAY, 16FR 10ML SIL FOLEY, URN: Brand: MEDLINE

## (undated) DEVICE — SOLUTION PREP POVIDONE IOD FOR SKIN MUCOUS MEM PRIOR TO

## (undated) DEVICE — TIP COVER ACCESSORY

## (undated) DEVICE — TOWEL,OR,DSP,ST,BLUE,STD,4/PK,20PK/CS: Brand: MEDLINE

## (undated) DEVICE — DRAPE,CHEST,FENES,15X10,STERIL: Brand: MEDLINE

## (undated) DEVICE — PAD SANITARY MTRN TAB BELT WRP NS 11IN

## (undated) DEVICE — MINOR SET UP: Brand: MEDLINE INDUSTRIES, INC.

## (undated) DEVICE — Device

## (undated) DEVICE — COLUMN DRAPE

## (undated) DEVICE — PLATE ES AD W 9FT CRD 2

## (undated) DEVICE — CABLE ENDOSCP L10FT ACT DISP

## (undated) DEVICE — ROYAL SILK SURGICAL GOWN, XL: Brand: CONVERTORS

## (undated) DEVICE — TOWEL,OR,DSP,ST,BLUE,DLX,8/PK,10PK/CS: Brand: MEDLINE

## (undated) DEVICE — DRESSING TRNSPAR W5XL4.5IN FLM SHT SEMIPERMEABLE WIND

## (undated) DEVICE — ELECTRODE PT RET AD L9FT HI MOIST COND ADH HYDRGEL CORDED

## (undated) DEVICE — SYSTEM SMK EVAC LAP TBNG FILTER HSNG BENT STYL PNK SEE CLR

## (undated) DEVICE — SUTURE PERMA-HAND SZ 2-0 L30IN NONABSORBABLE BLK L30MM FSL 679H

## (undated) DEVICE — E-Z CLEAN, NON-STICK, PTFE COATED, ELECTROSURGICAL BLADE ELECTRODE, 4 INCH (10.2 CM): Brand: MEGADYNE

## (undated) DEVICE — SUTURE VCRL SZ 4-0 L18IN ABSRB UD L19MM PS-2 3/8 CIR PRIM J496H

## (undated) DEVICE — SYRINGE MED 50ML LUERLOCK TIP

## (undated) DEVICE — TURNOVER KIT RM INF CTRL TECH

## (undated) DEVICE — SOLUTION IV 1000ML 0.9% SOD CHL

## (undated) DEVICE — CATHETER F BLLN 5CC 16FR 2 W HYDRGEL COAT LESS TRAUM LUB

## (undated) DEVICE — SUTURE STRATAFIX SPRL PDS + SZ 2-0 L6IN ABSRB VLT L36MM SXPP1B409

## (undated) DEVICE — PAD,NON-ADHERENT,3X8,STERILE,LF,1/PK: Brand: MEDLINE

## (undated) DEVICE — GLOVE,SURG,SENSICARE SLT,LF,PF,7: Brand: MEDLINE

## (undated) DEVICE — SYRINGE MED 20ML STD CLR PLAS LUERLOCK TIP N CTRL DISP

## (undated) DEVICE — BLANKET WRM W40.2XL55.9IN IORT LO BODY + MISTRAL AIR

## (undated) DEVICE — DRAPE C ARM UNIV W41XL74IN CLR PLAS XR VELC CLSR POLY STRP

## (undated) DEVICE — SUTURE PROL SZ 2-0 L36IN NONABSORBABLE BLU SH L26MM 1/2 CIR 8523H

## (undated) DEVICE — MINOR SET UP PK